# Patient Record
Sex: FEMALE | Race: WHITE | NOT HISPANIC OR LATINO | Employment: OTHER | URBAN - NONMETROPOLITAN AREA
[De-identification: names, ages, dates, MRNs, and addresses within clinical notes are randomized per-mention and may not be internally consistent; named-entity substitution may affect disease eponyms.]

---

## 2017-12-19 ENCOUNTER — AMBULATORY - GICH (OUTPATIENT)
Dept: RADIOLOGY | Facility: OTHER | Age: 62
End: 2017-12-19

## 2017-12-19 DIAGNOSIS — R78.81 BACTEREMIA: ICD-10-CM

## 2018-01-26 ENCOUNTER — DOCUMENTATION ONLY (OUTPATIENT)
Dept: FAMILY MEDICINE | Facility: OTHER | Age: 63
End: 2018-01-26

## 2018-01-26 RX ORDER — GABAPENTIN 300 MG/1
300 CAPSULE ORAL AT BEDTIME
Status: ON HOLD | COMMUNITY
Start: 2016-06-07 | End: 2020-08-22 | Stop reason: DRUGHIGH

## 2018-01-26 RX ORDER — MONTELUKAST SODIUM 10 MG/1
10 TABLET ORAL EVERY EVENING
COMMUNITY

## 2018-01-26 RX ORDER — HYDROCODONE BITARTRATE AND ACETAMINOPHEN 10; 325 MG/1; MG/1
1 TABLET ORAL EVERY 6 HOURS PRN
COMMUNITY
Start: 2016-02-23 | End: 2020-08-22

## 2018-01-26 RX ORDER — CALCITRIOL 0.25 UG/1
1 CAPSULE, LIQUID FILLED ORAL EVERY OTHER DAY
Status: ON HOLD | COMMUNITY
Start: 2015-09-11 | End: 2020-08-22

## 2018-01-26 RX ORDER — ASPIRIN 81 MG/1
81 TABLET, CHEWABLE ORAL DAILY
COMMUNITY

## 2018-01-26 RX ORDER — OXYCODONE HYDROCHLORIDE 5 MG/1
5 TABLET ORAL EVERY 6 HOURS PRN
COMMUNITY
Start: 2016-12-23 | End: 2021-09-14

## 2018-01-26 RX ORDER — FLUTICASONE PROPIONATE 50 MCG
2 SPRAY, SUSPENSION (ML) NASAL DAILY
COMMUNITY
Start: 2015-06-30

## 2018-01-26 RX ORDER — LEVOTHYROXINE SODIUM 200 UG/1
1 TABLET ORAL DAILY
COMMUNITY
Start: 2016-04-01

## 2018-01-26 RX ORDER — LEVOTHYROXINE SODIUM 25 UG/1
0.5 TABLET ORAL DAILY
COMMUNITY
Start: 2015-09-10

## 2018-01-26 RX ORDER — NITROGLYCERIN 0.4 MG/1
0.4 TABLET SUBLINGUAL
COMMUNITY

## 2018-01-26 RX ORDER — ALLOPURINOL 100 MG/1
100 TABLET ORAL DAILY
COMMUNITY
Start: 2015-09-11

## 2018-01-26 RX ORDER — ATORVASTATIN CALCIUM 80 MG/1
80 TABLET, FILM COATED ORAL EVERY EVENING
COMMUNITY
Start: 2016-01-13

## 2018-01-26 RX ORDER — CALCIUM CARBONATE 750 MG/1
1-2 TABLET, CHEWABLE ORAL DAILY PRN
Status: ON HOLD | COMMUNITY
End: 2020-08-22

## 2018-01-26 RX ORDER — METOPROLOL TARTRATE 25 MG/1
25 TABLET, FILM COATED ORAL 2 TIMES DAILY
COMMUNITY
End: 2021-09-14

## 2018-01-26 RX ORDER — LORATADINE 10 MG/1
10 TABLET ORAL DAILY PRN
Status: ON HOLD | COMMUNITY
End: 2020-08-22

## 2018-01-26 RX ORDER — TORSEMIDE 20 MG/1
80 TABLET ORAL DAILY
Status: ON HOLD | COMMUNITY
Start: 2016-12-25 | End: 2020-08-22

## 2018-01-26 RX ORDER — BUPROPION HYDROCHLORIDE 150 MG/1
3 TABLET ORAL DAILY
COMMUNITY
Start: 2015-10-12

## 2018-01-26 RX ORDER — CLOTRIMAZOLE 1 %
CREAM (GRAM) TOPICAL
COMMUNITY
Start: 2016-06-04 | End: 2021-09-14

## 2019-11-27 ENCOUNTER — MEDICAL CORRESPONDENCE (OUTPATIENT)
Dept: HEALTH INFORMATION MANAGEMENT | Facility: OTHER | Age: 64
End: 2019-11-27

## 2019-11-27 ENCOUNTER — RESULTS ONLY (OUTPATIENT)
Dept: LAB | Age: 64
End: 2019-11-27

## 2019-11-27 LAB
ALBUMIN SERPL-MCNC: 3.7 G/DL (ref 3.5–5.7)
ALP SERPL-CCNC: 102 U/L (ref 34–104)
ALT SERPL W P-5'-P-CCNC: 30 U/L (ref 7–52)
ANION GAP SERPL CALCULATED.3IONS-SCNC: 9 MMOL/L (ref 3–14)
AST SERPL W P-5'-P-CCNC: 26 U/L (ref 13–39)
BASOPHILS # BLD AUTO: 0.1 10E9/L (ref 0–0.2)
BASOPHILS NFR BLD AUTO: 1.2 %
BILIRUB SERPL-MCNC: 0.3 MG/DL (ref 0.3–1)
BUN SERPL-MCNC: 22 MG/DL (ref 7–25)
CALCIUM SERPL-MCNC: 9.3 MG/DL (ref 8.6–10.3)
CHLORIDE SERPL-SCNC: 95 MMOL/L (ref 98–107)
CO2 SERPL-SCNC: 31 MMOL/L (ref 21–31)
CREAT SERPL-MCNC: 2.81 MG/DL (ref 0.6–1.2)
DIFFERENTIAL METHOD BLD: ABNORMAL
EOSINOPHIL # BLD AUTO: 0.5 10E9/L (ref 0–0.7)
EOSINOPHIL NFR BLD AUTO: 6.4 %
ERYTHROCYTE [DISTWIDTH] IN BLOOD BY AUTOMATED COUNT: 16.5 % (ref 10–15)
GFR SERPL CREATININE-BSD FRML MDRD: 17 ML/MIN/{1.73_M2}
GLUCOSE SERPL-MCNC: 104 MG/DL (ref 70–105)
HBA1C MFR BLD: 6.3 % (ref 4–6)
HCT VFR BLD AUTO: 32.3 % (ref 35–47)
HGB BLD-MCNC: 10.4 G/DL (ref 11.7–15.7)
IMM GRANULOCYTES # BLD: 0.1 10E9/L (ref 0–0.4)
IMM GRANULOCYTES NFR BLD: 1.3 %
LYMPHOCYTES # BLD AUTO: 2.7 10E9/L (ref 0.8–5.3)
LYMPHOCYTES NFR BLD AUTO: 32.2 %
MCH RBC QN AUTO: 31.7 PG (ref 26.5–33)
MCHC RBC AUTO-ENTMCNC: 32.2 G/DL (ref 31.5–36.5)
MCV RBC AUTO: 99 FL (ref 78–100)
MONOCYTES # BLD AUTO: 1.2 10E9/L (ref 0–1.3)
MONOCYTES NFR BLD AUTO: 14.3 %
NEUTROPHILS # BLD AUTO: 3.7 10E9/L (ref 1.6–8.3)
NEUTROPHILS NFR BLD AUTO: 44.6 %
PLATELET # BLD AUTO: 274 10E9/L (ref 150–450)
POTASSIUM SERPL-SCNC: 4.5 MMOL/L (ref 3.5–5.1)
PROT SERPL-MCNC: 6.7 G/DL (ref 6.4–8.9)
RBC # BLD AUTO: 3.28 10E12/L (ref 3.8–5.2)
SODIUM SERPL-SCNC: 135 MMOL/L (ref 134–144)
URATE SERPL-MCNC: 2.7 MG/DL (ref 4.4–7.6)
WBC # BLD AUTO: 8.3 10E9/L (ref 4–11)

## 2019-12-05 ENCOUNTER — HOSPITAL ENCOUNTER (EMERGENCY)
Facility: OTHER | Age: 64
Discharge: ANOTHER HOSPITAL | End: 2019-12-05
Attending: PHYSICIAN ASSISTANT | Admitting: PHYSICIAN ASSISTANT
Payer: MEDICARE

## 2019-12-05 ENCOUNTER — APPOINTMENT (OUTPATIENT)
Dept: GENERAL RADIOLOGY | Facility: OTHER | Age: 64
End: 2019-12-05
Attending: PHYSICIAN ASSISTANT
Payer: MEDICARE

## 2019-12-05 VITALS
WEIGHT: 293 LBS | SYSTOLIC BLOOD PRESSURE: 106 MMHG | TEMPERATURE: 97.8 F | BODY MASS INDEX: 67.81 KG/M2 | DIASTOLIC BLOOD PRESSURE: 63 MMHG | HEIGHT: 55 IN | HEART RATE: 76 BPM | OXYGEN SATURATION: 93 % | RESPIRATION RATE: 16 BRPM

## 2019-12-05 DIAGNOSIS — E87.1 HYPONATREMIA: ICD-10-CM

## 2019-12-05 DIAGNOSIS — R79.89 AZOTEMIA: ICD-10-CM

## 2019-12-05 DIAGNOSIS — A41.89 SEPSIS DUE TO OTHER ETIOLOGY (H): ICD-10-CM

## 2019-12-05 DIAGNOSIS — L03.818 CELLULITIS OF OTHER SPECIFIED SITE: ICD-10-CM

## 2019-12-05 LAB
ALBUMIN SERPL-MCNC: 3.5 G/DL (ref 3.5–5.7)
ALP SERPL-CCNC: 112 U/L (ref 34–104)
ALT SERPL W P-5'-P-CCNC: 50 U/L (ref 7–52)
ANION GAP SERPL CALCULATED.3IONS-SCNC: 13 MMOL/L (ref 3–14)
AST SERPL W P-5'-P-CCNC: 38 U/L (ref 13–39)
BASOPHILS # BLD AUTO: 0.1 10E9/L (ref 0–0.2)
BASOPHILS NFR BLD AUTO: 0.2 %
BILIRUB SERPL-MCNC: 0.4 MG/DL (ref 0.3–1)
BUN SERPL-MCNC: 25 MG/DL (ref 7–25)
CALCIUM SERPL-MCNC: 9.4 MG/DL (ref 8.6–10.3)
CHLORIDE SERPL-SCNC: 89 MMOL/L (ref 98–107)
CO2 SERPL-SCNC: 28 MMOL/L (ref 21–31)
CREAT SERPL-MCNC: 3.47 MG/DL (ref 0.6–1.2)
CRP SERPL-MCNC: 19.6 MG/L
DIFFERENTIAL METHOD BLD: ABNORMAL
EOSINOPHIL # BLD AUTO: 0 10E9/L (ref 0–0.7)
EOSINOPHIL NFR BLD AUTO: 0.2 %
ERYTHROCYTE [DISTWIDTH] IN BLOOD BY AUTOMATED COUNT: 16.6 % (ref 10–15)
GFR SERPL CREATININE-BSD FRML MDRD: 13 ML/MIN/{1.73_M2}
GLUCOSE SERPL-MCNC: 220 MG/DL (ref 70–105)
HCT VFR BLD AUTO: 30.1 % (ref 35–47)
HGB BLD-MCNC: 9.7 G/DL (ref 11.7–15.7)
IMM GRANULOCYTES # BLD: 0.4 10E9/L (ref 0–0.4)
IMM GRANULOCYTES NFR BLD: 1.8 %
LACTATE SERPL-SCNC: 2.2 MMOL/L (ref 0.5–2.2)
LYMPHOCYTES # BLD AUTO: 1.2 10E9/L (ref 0.8–5.3)
LYMPHOCYTES NFR BLD AUTO: 5.3 %
MCH RBC QN AUTO: 31.5 PG (ref 26.5–33)
MCHC RBC AUTO-ENTMCNC: 32.2 G/DL (ref 31.5–36.5)
MCV RBC AUTO: 98 FL (ref 78–100)
MONOCYTES # BLD AUTO: 2.3 10E9/L (ref 0–1.3)
MONOCYTES NFR BLD AUTO: 9.8 %
NEUTROPHILS # BLD AUTO: 19 10E9/L (ref 1.6–8.3)
NEUTROPHILS NFR BLD AUTO: 82.7 %
PLATELET # BLD AUTO: 322 10E9/L (ref 150–450)
POTASSIUM SERPL-SCNC: 4.5 MMOL/L (ref 3.5–5.1)
PROT SERPL-MCNC: 6.9 G/DL (ref 6.4–8.9)
RBC # BLD AUTO: 3.08 10E12/L (ref 3.8–5.2)
SODIUM SERPL-SCNC: 130 MMOL/L (ref 134–144)
WBC # BLD AUTO: 23 10E9/L (ref 4–11)

## 2019-12-05 PROCEDURE — 25000125 ZZHC RX 250: Performed by: PHYSICIAN ASSISTANT

## 2019-12-05 PROCEDURE — 86140 C-REACTIVE PROTEIN: CPT | Performed by: PHYSICIAN ASSISTANT

## 2019-12-05 PROCEDURE — 80053 COMPREHEN METABOLIC PANEL: CPT | Performed by: PHYSICIAN ASSISTANT

## 2019-12-05 PROCEDURE — 99291 CRITICAL CARE FIRST HOUR: CPT | Mod: Z6 | Performed by: PHYSICIAN ASSISTANT

## 2019-12-05 PROCEDURE — 96368 THER/DIAG CONCURRENT INF: CPT | Performed by: PHYSICIAN ASSISTANT

## 2019-12-05 PROCEDURE — 99291 CRITICAL CARE FIRST HOUR: CPT | Mod: 25 | Performed by: PHYSICIAN ASSISTANT

## 2019-12-05 PROCEDURE — 25800030 ZZH RX IP 258 OP 636: Performed by: PHYSICIAN ASSISTANT

## 2019-12-05 PROCEDURE — 96375 TX/PRO/DX INJ NEW DRUG ADDON: CPT | Performed by: PHYSICIAN ASSISTANT

## 2019-12-05 PROCEDURE — 36415 COLL VENOUS BLD VENIPUNCTURE: CPT | Performed by: PHYSICIAN ASSISTANT

## 2019-12-05 PROCEDURE — 96374 THER/PROPH/DIAG INJ IV PUSH: CPT | Performed by: PHYSICIAN ASSISTANT

## 2019-12-05 PROCEDURE — 71046 X-RAY EXAM CHEST 2 VIEWS: CPT

## 2019-12-05 PROCEDURE — 87040 BLOOD CULTURE FOR BACTERIA: CPT | Mod: 91 | Performed by: PHYSICIAN ASSISTANT

## 2019-12-05 PROCEDURE — 96365 THER/PROPH/DIAG IV INF INIT: CPT | Performed by: PHYSICIAN ASSISTANT

## 2019-12-05 PROCEDURE — 25000128 H RX IP 250 OP 636: Performed by: PHYSICIAN ASSISTANT

## 2019-12-05 PROCEDURE — 83605 ASSAY OF LACTIC ACID: CPT | Performed by: PHYSICIAN ASSISTANT

## 2019-12-05 PROCEDURE — 85025 COMPLETE CBC W/AUTO DIFF WBC: CPT | Performed by: PHYSICIAN ASSISTANT

## 2019-12-05 RX ORDER — DIPHENHYDRAMINE HYDROCHLORIDE 50 MG/ML
50 INJECTION INTRAMUSCULAR; INTRAVENOUS ONCE
Status: COMPLETED | OUTPATIENT
Start: 2019-12-05 | End: 2019-12-05

## 2019-12-05 RX ADMIN — FAMOTIDINE 40 MG: 10 INJECTION, SOLUTION INTRAVENOUS at 14:42

## 2019-12-05 RX ADMIN — TAZOBACTAM SODIUM AND PIPERACILLIN SODIUM 4.5 G: 500; 4 INJECTION, SOLUTION INTRAVENOUS at 13:41

## 2019-12-05 RX ADMIN — DIPHENHYDRAMINE HYDROCHLORIDE 50 MG: 50 INJECTION INTRAMUSCULAR; INTRAVENOUS at 14:21

## 2019-12-05 RX ADMIN — VANCOMYCIN HYDROCHLORIDE 2000 MG: 1 INJECTION, POWDER, LYOPHILIZED, FOR SOLUTION INTRAVENOUS at 13:40

## 2019-12-05 ASSESSMENT — MIFFLIN-ST. JEOR: SCORE: 1233.11

## 2019-12-05 NOTE — ED AVS SNAPSHOT
Lucila Willett #[id number] (CSN:[id number]) (64 year old F) (Adm: 19)    [id number]               [facility]: [phone]            Patient Demographics     Patient Name  Lucila Willett Sex  Female          Age  1955 (64 year old) SSN  [id number] Address  [address] [facility] [address] Phone  [phone] (Home)  [phone] (Mobile) *Preferred*      PCP and Center    Primary Care Provider  Phone Center     No Ref-Primary, Physician None No address on file        Emergency Contact(s)     Name Relation Home Work Mobile    Tim Willett Son   [phone]    Lino Willett Son   [phone]    Anant Laneer   [phone]      Admission Information     Current Information     Attending Provider Admitting Provider Admission Type Admission Status    Tim Ordoñez PA-C  Emergency Admission (Confirmed)            Admission Date/Time Discharge Date Hospital Service Auth/Cert Status    19  11:24 AM  Emergency Medicine Incomplete            Hospital Area Unit Room/Bed       [facility] EMERGENCY DEPT ED03/ED03                     Admission     Complaint    None      Hospital Account     Name Acct ID Class Status Primary Coverage    Lucila Willett [id number] Emergency Open MEDICARE - MEDICARE            Guarantor Account (for Hospital Account #[id number])     Name Relation to Pt Service Area Active? Acct Type    Lucila Willett Self FCS Yes Personal/Family    Address Phone          [address] [place] [address] [phone](H)  [phone](O)              Coverage Information (for Hospital Account #[id number])     1. MEDICARE/MEDICARE     F/O Payor/Plan Precert #    MEDICARE/MEDICARE     Subscriber Subscriber #    Lucila Willett [id number]    Address Phone    ATTN CLAIMS  [address]  [facility] [place] [address] [phone]          2. COMMERCIAL/IMCARE PMAP     F/O Payor/Plan Precert #     COMMERCIAL/Actively [facility]     Subscriber Subscriber #    Lucila Willett [id number]    Address Phone    [address]  [place] [address] [phone]                                                      INTERAGENCY TRANSFER FORM - PHYSICIAN ORDERS   12/5/2019                      [facility]: [phone]             Attending Provider:  Tim Ordoñez PA-C    Allergies:  Cetirizine, Pregabalin, Latex, Rosiglitazone    Infection:  None   Service:  EMERGENCY ME    Ht:  0.61 m (2')   Wt:  133.8 kg (295 lb)   Admission Wt:  133.8 kg (295 lb)    BMI:  360.08 kg/m 2   BSA:  1.51 m 2            ED Clinical Impression     Diagnosis Description Comment Added By Time Added    Sepsis due to other etiology (H) [A41.89] Sepsis due to other etiology (H) [A41.89]  Tim Ordoñez PA-C 12/5/2019  2:03 PM    Cellulitis of other specified site [L03.818] Cellulitis of other specified site [L03.818]  Tim Ordoñez PA-C 12/5/2019  2:04 PM    Hyponatremia [E87.1] Hyponatremia [E87.1]  Tim Ordoñez PA-C 12/5/2019  2:04 PM    Azotemia [R79.89] Azotemia [R79.89]  Tim Ordoñez PA-C 12/5/2019  2:04 PM      Hospital Problem List as of 12/5/2019    None      Non-hospital Problem List as of 12/5/2019             Priority Class Noted    Essential hypertension Medium  2/27/2008    Hypothyroidism Medium  2/27/2008    Sleep apnea Medium  2/27/2008    Gout Medium  2/29/2008    Atherosclerosis of coronary artery Medium  3/17/2010    Chronic kidney disease, stage IV (severe) (H) Medium  3/23/2012    Anemia Medium  9/12/2012    Cellulitis and abscess of leg, except foot Medium  9/12/2012    Diabetes mellitus, type II (H) Medium  9/12/2012    Hypertensive heart failure with end stage renal disease (H) Medium  9/12/2012    Morbid obesity (H) Medium  9/12/2012    Hypokalemia Medium  9/13/2012    Type 2 diabetes mellitus (H) Medium  6/11/2013    Chronic pain disorder Medium  6/30/2015    Diastolic  heart failure (H) Medium  7/1/2015    Foot ulcer (H) Medium  1/11/2016    Cellulitis of left lower extremity Medium  6/9/2016    Severe sepsis (H) Medium  6/9/2016    Hyponatremia Medium  6/10/2016    Leukocytosis Medium  12/19/2016      Current Code Status     Date Active Code Status Order ID Comments User Context       Not on file         Medication Review      UNREVIEWED medications. Ask your doctor about these medications       Dose / Directions Comments   allopurinol 100 MG tablet  Commonly known as:  ZYLOPRIM      Dose:  200 mg  Take 200 mg by mouth daily  Refills:  0      atorvastatin 80 MG tablet  Commonly known as:  LIPITOR      Dose:  80 mg  Take 80 mg by mouth every evening  Refills:  0      buPROPion 150 MG 24 hr tablet  Commonly known as:  WELLBUTRIN XL      Dose:  3 tablet  Take 3 tablets by mouth daily *SWALLOW WHOLE. DO NOT CRUSH OR CHEW*  Refills:  0      calcitRIOL 0.25 MCG capsule  Commonly known as:  ROCALTROL      Dose:  1 capsule  Take 1 capsule by mouth every other day  Refills:  0      calcium carbonate 750 MG Chew      Dose:  1-2 tablet  Take 1-2 tablets by mouth daily as needed for heartburn  Refills:  0      clotrimazole 1 % external cream  Commonly known as:  LOTRIMIN      APPLY TO AFFECTED AREAS  TOPICALLY TWO TIMES A DAY AS NEEDED FOR YEAST INFECTION  Refills:  0      D 5000 125 MCG (5000 UT) Tabs  Generic drug:  Cholecalciferol      Dose:  5,000 Units  Take 5,000 Units by mouth daily  Refills:  0      FLUoxetine 20 MG capsule  Commonly known as:  PROzac      TAKE 2 CAPSULES ORALLY   TWO TIMES A DAY  Refills:  0      fluticasone 50 MCG/ACT nasal spray  Commonly known as:  FLONASE      Dose:  2 spray  Spray 2 sprays into both nostrils daily as needed for allergies  Refills:  0      gabapentin 300 MG capsule  Commonly known as:  NEURONTIN      Dose:  300 mg  Take 300 mg by mouth At Bedtime  Refills:  0      GoodSense Aspirin 325 MG tablet  Generic drug:  aspirin      Dose:  325 mg  Take  325 mg by mouth daily With a meal  Refills:  0      HYDROcodone-acetaminophen  MG per tablet  Commonly known as:  NORCO      Dose:  1-2 tablet  Take 1-2 tablets by mouth daily as needed for pain  Refills:  0      insulin aspart 100 UNIT/ML pen  Commonly known as:  NovoLOG PEN      Dose:  10-20 Units  Inject 10-20 Units Subcutaneous Inject 10-20 Units subcutaneous 4 times daily before meals and at bedtime. CARB RATIO 1 UNIT PER 5 CARBS  Refills:  0      insulin glargine 100 UNIT/ML pen  Commonly known as:  LANTUS PEN      Dose:  60 Units  Inject 60 Units Subcutaneous every morning  Refills:  0      * levothyroxine 25 MCG tablet  Commonly known as:  SYNTHROID/LEVOTHROID      Dose:  1 tablet  Take 1 tablet by mouth daily Take with 200 microgram tablet.  Refills:  0      * levothyroxine 200 MCG tablet  Commonly known as:  SYNTHROID/LEVOTHROID      Dose:  1 tablet  Take 1 tablet by mouth daily Take on empty stomach with plenty of water. Take with 25 microgram tablet.  Refills:  0      loratadine 10 MG tablet  Commonly known as:  CLARITIN      Dose:  10 mg  Take 10 mg by mouth daily as needed for allergies  Refills:  0      metoprolol tartrate 25 MG tablet  Commonly known as:  LOPRESSOR      Dose:  25 mg  Take 25 mg by mouth 2 times daily  Refills:  0      montelukast 10 MG tablet  Commonly known as:  SINGULAIR      Dose:  10 mg  Take 10 mg by mouth At Bedtime  Refills:  0      nitroGLYcerin 0.4 MG sublingual tablet  Commonly known as:  NITROSTAT      Dose:  0.4 mg  Place 0.4 mg under the tongue Place 0.4 mg under the tongue every 5 minutes if needed. Patient not using at this time.   Indications: ANGINA  Refills:  0      oxyCODONE 5 MG tablet  Commonly known as:  ROXICODONE      Dose:  5 mg  Take 5 mg by mouth every 4 hours as needed for pain  Refills:  0      ranitidine 150 MG tablet  Commonly known as:  ZANTAC      Dose:  150 mg  Take 150 mg by mouth daily  Refills:  0      torsemide 20 MG tablet  Commonly known  "as:  DEMADEX      Dose:  80 mg  Take 80 mg by mouth daily  Refills:  0          * This list has 2 medication(s) that are the same as other medications prescribed for you. Read the directions carefully, and ask your doctor or other care provider to review them with you.            CONTINUE these medications which have NOT CHANGED       Dose / Directions Comments   blood glucose lancets standard  Commonly known as:  NO BRAND SPECIFIED      Test blood sugar 3 times daily and as needed for signs of high or low blood sugar.  Refills:  0      blood glucose test strip  Commonly known as:  NO BRAND SPECIFIED      TEST BLOOD SUGARS THREE  TIMES A DAY AND AS NEEDED  Refills:  0      Needle (Disp) 31G X 5/16\" Misc      Pen needle  Refills:  0                                                   INTERAGENCY TRANSFER FORM - NURSING   12/5/2019                      [facility]: [phone]             Attending Provider:  Tim Ordoñez PA-C    Allergies:  Cetirizine, Pregabalin, Latex, Rosiglitazone    Infection:  None   Service:  EMERGENCY ME    Ht:  0.61 m (2')   Wt:  133.8 kg (295 lb)   Admission Wt:  133.8 kg (295 lb)    BMI:  360.08 kg/m 2   BSA:  1.51 m 2            Advance Directives        Scanned docmt in ACP Activity?            No scanned doc          Immunizations     None      ASSESSMENT     Discharge Profile Flowsheet     GASTROINTESTINAL (ADULT,PEDIATRIC,OB)     GI Signs/Symptoms  abdominal discomfort 12/05/19 1142    GI WDL  ex;all 12/05/19 1142   COMMUNICATION ASSESSMENT      Abdominal Appearance  obese 12/05/19 1142   Patient's communication style  spoken language (English or Bilingual) 12/05/19 1123    All Quadrants Abdominal Palpation  soft/nontender 12/05/19 1142                 Vitals     Vital Signs Flowsheet     VITAL SIGNS     Height  0.61 m (2') 12/05/19 1128    Temp  97.8  F (36.6  C) 12/05/19 1128   Height Method  Stated 12/05/19 1128    Temp src  Tympanic 12/05/19 1128   " Weight  133.8 kg (295 lb) 12/05/19 1128    Resp  16 12/05/19 1128   BSA (Calculated - sq m)  1.51 12/05/19 1128    Pulse  74 12/05/19 1328   BMI (Calculated)  360.08 12/05/19 1128    BP  113/54 12/05/19 1328   FRANSISCO COMA SCALE      OXYGEN THERAPY     Best Eye Response  4-->(E4) spontaneous 12/05/19 1140    SpO2  95 % 12/05/19 1328   Best Motor Response  6-->(M6) obeys commands 12/05/19 1140    O2 Device  None (Room air) 12/05/19 1128   Best Verbal Response  5-->(V5) oriented 12/05/19 1140    HEIGHT AND WEIGHT     Sondheimer Coma Scale Score  15 12/05/19 1140            Patient Lines/Drains/Airways Status    Active LINES/DRAINS/AIRWAYS     Name: Placement date: Placement time: Site: Days: Last dressing change:    Peripheral IV 12/05/19 Right Upper forearm  12/05/19   1252   Upper forearm  less than 1             Patient Lines/Drains/Airways Status    Active PICC/CVC     None            Intake/Output Detail Report     None      Case Management/Discharge Planning     Case Management/Discharge Planning Flowsheet     VIOLENCE RISK     RETIRE:ABUSE RISK SCREEN      Feels Like Hurting Others  no 12/05/19 1129   OBSERVATION: Is there reason to believe there has been maltreatment of a vulnerable adult (ie. Physical/Sexual/Emotional abuse, self neglect, lack of adequate food, shelter, medical care, or financial exploitation)?  no 12/05/19 1129                  [facility]: [phone]            Medication Administration Report for Lucila Willett as of 12/05/19 1413   Legend:    Given Hold Not Given Due Canceled Entry Other Actions    Time Time (Time) Time  Time-Action       Inactive    Active    Linked        Medications 11/29/19 11/30/19 12/01/19 12/02/19 12/03/19 12/04/19 12/05/19   Completed Medications    piperacillin-tazobactam (ZOSYN) intermittent infusion 4.5 g  Dose: 4.5 g  Freq: ONCE Route: IV  Indications of Use: SEPSIS  Last Dose: Stopped (12/05/19 1413)  Start: 12/05/19 1315   End:  19   Admin. Amount: 4.5 g = 100 mL Conc: 4.5 g/100 mL  Last Admin: 19 1341  Dispense Loc: EMERGENCY DEPARTMENT  Infused Over: 30 Minutes  Administrations Remainin  Volume: 100 mL   Current Line: Peripheral IV 19 Right Upper forearm          1341 (4.5 g)-New Bag       1413-Stopped           vancomycin (VANCOCIN) 2,000 mg in sodium chloride 0.9 % 500 mL intermittent infusion  Dose: 2,000 mg  Freq: ONCE Route: IV  Indications of Use: SKIN AND SOFT TISSUE INFECTION  Last Dose: Stopped (19)  Start: 19   End: 19   Admin Instructions: Vesicant.    For peripheral catheter: If dose between 2-2.5 g, infuse over 2 hours.    For central catheter: If dose is below 2 g, dose may be infused over 1 hour.    Admin. Amount: 2,000 mg  Last Admin: 19 1340  Dispense Loc: [facility]  Infused Over: 2 Hours  Administrations Remainin  Volume: 500 mL   Mixture Administration Information:   Medication Type Amount   vancomycin 100 mg/mL SOLR Medications 2,000 mg   sodium chloride 0.9 % SOLN Base 500 mL           Current Line: Peripheral IV 19 Right Upper forearm          1340 (2,000 mg)-New Bag       1413-ED Infusing on Admission/transfer                       INTERAGENCY TRANSFER FORM - NOTES (H&P, Discharge Summary, Consults, Procedures, Therapies)   2019                      [facility]: [phone]            History & Physicals    No notes of this type exist for this encounter.     Discharge Summaries    No notes of this type exist for this encounter.     Consult Notes    No notes of this type exist for this encounter.     Progress Notes - Physician (Notes for yesterday and today)    No notes of this type exist for this encounter.     Procedure Notes    No notes of this type exist for this encounter.     Progress Notes - Therapies (Notes from 19 through 19)    No notes of this type exist for this encounter.                                          INTERAGENCY TRANSFER FORM - LAB / IMAGING / EKG / EMG RESULTS   12/5/2019                      [facility]: [phone]            Unresulted Labs (24h ago, onward)     Start       Ordered    12/05/19 1133  UA reflex to Microscopic and Culture  ROUTINE,   Routine      12/05/19 1132               Lab Results - 3 Days      Comprehensive metabolic panel [[id number]] (Abnormal)  Resulted: 12/05/19 1356, Result status: Final result   Ordering provider:  Tim Ordoñez PA-C  12/05/19 1314 Resulting lab:  [facility]    Specimen Information    Type Source Collected On   Blood   12/05/19 1145          Components    Component Value Reference Range Flag Lab   Sodium 130 134 - 144 mmol/L  [facility]   Potassium 4.5 3.5 - 5.1 mmol/L   [facility]   Chloride 89 98 - 107 mmol/L  [facility]   Carbon Dioxide 28 21 - 31 mmol/L   [facility]   Anion Gap 13 3 - 14 mmol/L   [facility]   Glucose 220 70 - 105 mg/dL H [facility]   Urea Nitrogen 25 7 - 25 mg/dL   [facility]   Creatinine 3.47 0.60 - 1.20 mg/dL H [facility]   GFR Estimate 13 >60 mL/min/{1.73_m2}  [facility]   GFR Estimate If Black 16 >60 mL/min/{1.73_m2}  [facility]   Calcium 9.4 8.6 - 10.3 mg/dL   [facility]   Bilirubin Total 0.4 0.3 - 1.0 mg/dL   [facility]   Albumin 3.5 3.5 - 5.7 g/dL   [facility]   Protein Total 6.9 6.4 - 8.9 g/dL   [facility]   Alkaline Phosphatase 112 34 - 104 U/L H [facility]   ALT 50 7 - 52 U/L   [facility]   AST 38 13 - 39 U/L   [facility]            CRP inflammation [[id number]] (Abnormal)  Resulted: 12/05/19 1311, Result status: Final result   Ordering provider:  Tim Ordoñez PA-C  12/05/19 1245 Resulting lab:  [facility]    Specimen Information    Type Source Collected On   Blood   12/05/19 1145          Components    Component Value Reference Range Flag Lab   CRP Inflammation 19.6 <0.5 mg/L H [facility]            Lactic acid [[id number]]   Resulted: 12/05/19 1311, Result status: Final result   Ordering provider:  Tim Ordoñez PA-C  12/05/19 1245 Resulting lab:  [facility]    Specimen Information    Type Source Collected On   Blood   12/05/19 1145          Components    Component Value Reference Range Flag Lab   Lactic Acid 2.2 0.5 - 2.2 mmol/L   [facility]            Blood culture [[id number]]  Resulted: 12/05/19 1237, Result status: In process   Ordering provider:  Tim Ordoñez PA-C  12/05/19 1159 Resulting lab:  [facility]    Specimen Information    Type Source Collected On   Blood   12/05/19 1225            Blood culture [[id number]]  Resulted: 12/05/19 1236, Result status: In process   Ordering provider:  Tim Ordoñez PA-C  12/05/19 1159 Resulting lab:  [facility]    Specimen Information    Type Source Collected On   Blood   12/05/19 1225            CBC with platelets differential [[id number]] (Abnormal)  Resulted: 12/05/19 1152, Result status: Final result   Ordering provider:  Tim Ordoñez PA-C  12/05/19 1132 Resulting lab:  [facility]    Specimen Information    Type Source Collected On   Blood   12/05/19 1145          Components    Component Value Reference Range Flag Lab   WBC 23.0 4.0 - 11.0 10e9/L H [facility]   RBC Count 3.08 3.8 - 5.2 10e12/L  [facility]   Hemoglobin 9.7 11.7 - 15.7 g/dL  [facility]   Hematocrit 30.1 35.0 - 47.0 %  [facility]   MCV 98 78 - 100 fl   [facility]   MCH 31.5 26.5 - 33.0 pg   [facility]   MCHC 32.2 31.5 - 36.5 g/dL   [facility]   RDW 16.6 10.0 - 15.0 % H [facility]   Platelet Count 322 150 - 450 10e9/L   [facility]   Diff Method Automated Method     [facility]   % Neutrophils 82.7 %   [facility]   % Lymphocytes 5.3 %   [facility]   % Monocytes 9.8 %   [facility]   % Eosinophils 0.2 %   [facility]   % Basophils 0.2 %   [facility]   % Immature Granulocytes 1.8 %   [facility]   Absolute Neutrophil 19.0 1.6 - 8.3 10e9/L H [facility]   Absolute  Lymphocytes 1.2 0.8 - 5.3 10e9/L   [facility]   Absolute Monocytes 2.3 0.0 - 1.3 10e9/L H [facility]   Absolute Eosinophils 0.0 0.0 - 0.7 10e9/L   [facility]   Absolute Basophils 0.1 0.0 - 0.2 10e9/L   [facility]   Abs Immature Granulocytes 0.4 0 - 0.4 10e9/L   [facility]            Testing Performed By     Lab - Abbreviation Name Director Address Valid Date Range    45 - FEA732 [facility] Unknown Unknown 01/28/02 0000 - Present    1743 - [facility] Unknown [address]  [place] [address] 08/07/15 0948 - Present               Imaging Results - 3 Days      XR Chest 2 Views [[id number]]  Resulted: 12/05/19 1226, Result status: Final result   Ordering provider:  Tim Ordoñez PA-C  12/05/19 1201 Resulted by:  Srinivasan Richardson MD   Performed:  12/05/19 1207 - 12/05/19 1221 Accession number:  [id number]   Resulting lab:  RADIOLOGY RESULTS   Narrative:  PROCEDURE:  XR CHEST 2 VW    HISTORY:  cough.     COMPARISON:  12/19/2016    FINDINGS:   The cardiac silhouette is enlarged but stable. There are postoperative  changes of median sternotomy. Right-sided central line is present with  tip overlying the cavoatrial junction. The pulmonary vasculature is  normal.  The lungs are clear. No pleural effusion or pneumothorax.     Impression:  IMPRESSION:  Mild cardiomegaly, stable.      SRINIVASAN RICHARDSON MD      Testing Performed By     Lab - Abbreviation Name Director Address Valid Date Range    104 - Rad [facility] RADIOLOGY RESULTS Unknown Unknown 02/16/05 1553 - Present            Encounter-Level Documents:    There are no encounter-level documents.     Order-Level Documents:    There are no order-level documents.

## 2019-12-05 NOTE — ED TRIAGE NOTES
Pt arrives to ED from [facility]. Pt reports having a wound on her leg that needs to be looked at. Pt has a wound vac but it is not on at this time. Pt felt feverish, afebrile currently.    Yasmeen Clark RN on 12/5/2019 at 11:25 AM

## 2019-12-05 NOTE — ED PROVIDER NOTES
History     Chief Complaint   Patient presents with     Wound Check     HPI  Lucila Willett is a 64 year old female who presents to the emergency department this evening for evaluation she is status post left brachiocephalic arteriovenous fistula creation using autogenous access on 09/06/19 with Dr. Gomez. Her recovery was complicated by steal syndrome, with patient complaining of left hand numbness, coolness and pain. She was taken to the operating room on 10/25/19 for a left arm DRIL procedure (distal revascularization with interval ligation; left brachial artery to radial artery bypass using harvested left leg greater saphenous vein). She recovered well. Her post-operative course was complicated by ongoing left hand numbness and dysfunction and also a non healing left thigh wound currently treated with a wound vac.     She was seen in the clinic 3 days ago it was noted that she had left thigh erythema and warmth.  It was noted by the nursing home today that she had increased drainage and the patient felt feverish.  She was not chilled.  She does not have any headaches dizziness no visual disturbances runny nose nasal congestion sinus pressure sore throat or cough no chest pain shortness of breath.    Patient does dialyze Monday, Wednesday and IV and Friday here in town.        Allergies:  Allergies   Allergen Reactions     Cetirizine      Other reaction(s): *Unknown - Pt Doesn't Remember     Pregabalin      Other reaction(s): Other - Describe In Comment Field  Floaters in eyes.      Latex Rash     Patient states she gets a rash     Rosiglitazone Rash     Patient states she gets a rash       Problem List:    Patient Active Problem List    Diagnosis Date Noted     Leukocytosis 12/19/2016     Priority: Medium     Hyponatremia 06/10/2016     Priority: Medium     Cellulitis of left lower extremity 06/09/2016     Priority: Medium     Severe sepsis (H) 06/09/2016     Priority: Medium     Foot ulcer (H) 01/11/2016      Priority: Medium     Diastolic heart failure (H) 07/01/2015     Priority: Medium     Chronic pain disorder 06/30/2015     Priority: Medium     Overview:   Overview:   [facility] Agreement for Opioid Treatment  PHYSICIAN:  Gauri Grimm MD  PHARMACY:  [facility] in [place], [place]  PHARMACY PHONE:  [phone]    Last two urine drug screens have been negative - on 1/7/13 it had been two days since she took lortab.  Negative for norco aug 2015       Type 2 diabetes mellitus (H) 06/11/2013     Priority: Medium     Overview:   Overview:   LDL 94 - on crestor  Off valsartan because of worsening renal function ? Hypotension (10/2014: will retry low dose lisinopril)  On ASA  Right diabetic retinopathy on right 2/2014       Hypokalemia 09/13/2012     Priority: Medium     Anemia 09/12/2012     Priority: Medium     Cellulitis and abscess of leg, except foot 09/12/2012     Priority: Medium     Diabetes mellitus, type II (H) 09/12/2012     Priority: Medium     Overview:   a system change updated this record. This will not affect patient care or billing. This comment can be deleted.       Hypertensive heart failure with end stage renal disease (H) 09/12/2012     Priority: Medium     Morbid obesity (H) 09/12/2012     Priority: Medium     Chronic kidney disease, stage IV (severe) (H) 03/23/2012     Priority: Medium     Overview:   Overview:   IMO Update 10/11       Atherosclerosis of coronary artery 03/17/2010     Priority: Medium     Overview:   Overview:   IMO Update       Gout 02/29/2008     Priority: Medium     Essential hypertension 02/27/2008     Priority: Medium     Hypothyroidism 02/27/2008     Priority: Medium     Overview:   Overview:   IMO Update 10/11       Sleep apnea 02/27/2008     Priority: Medium     Overview:   Overview:   10/2012: cpap did not work - trying BIPAP          Past Medical History:    Past Medical History:   Diagnosis Date     Atherosclerotic heart disease of native coronary artery without angina  pectoris      Cellulitis      Chronic kidney disease      Diastolic congestive heart failure (H)      Hypothyroidism      Major depressive disorder, single episode      Obesity      Personal history of other medical treatment (CODE)      Sleep apnea      Type 2 diabetes mellitus without complications (H)        Past Surgical History:    Past Surgical History:   Procedure Laterality Date     CHOLECYSTECTOMY      No Comments Provided     OTHER SURGICAL HISTORY      [id number],OTHER     OTHER SURGICAL HISTORY      51359.0,NM SPINE FUSION ANTER 3 SGMTS     OTHER SURGICAL HISTORY      LOC-1006.04,CABG     OTHER SURGICAL HISTORY      140897,ENDOSCOPY GI       Family History:    History reviewed. No pertinent family history.    Social History:  Marital Status:   [4]  Social History     Tobacco Use     Smoking status: Former Smoker     Packs/day: 2.00     Years: 20.00     Pack years: 40.00     Types: Cigarettes     Last attempt to quit: 1997     Years since quittin.2     Smokeless tobacco: Never Used   Substance Use Topics     Alcohol use: Yes     Alcohol/week: 0.0 standard drinks     Comment: Alcoholic Drinks/day: Occasionally     Drug use: Unknown     Types: Other     Comment: Drug use: No        Medications:    allopurinol (ZYLOPRIM) 100 MG tablet  aspirin (GOODSENSE ASPIRIN) 325 MG tablet  atorvastatin (LIPITOR) 80 MG tablet  blood glucose (NO BRAND SPECIFIED) lancets standard  blood glucose monitoring (NO BRAND SPECIFIED) test strip  buPROPion (WELLBUTRIN XL) 150 MG 24 hr tablet  calcitRIOL (ROCALTROL) 0.25 MCG capsule  calcium carbonate 750 MG CHEW  Cholecalciferol (D 5000) 5000 UNITS TABS  clotrimazole (LOTRIMIN) 1 % cream  FLUoxetine (PROZAC) 20 MG capsule  fluticasone (FLONASE) 50 MCG/ACT spray  gabapentin (NEURONTIN) 300 MG capsule  HYDROcodone-acetaminophen (NORCO)  MG per tablet  insulin aspart (NOVOLOG PEN) 100 UNIT/ML injection  insulin glargine (LANTUS) 100 UNIT/ML injection  levothyroxine  "(SYNTHROID/LEVOTHROID) 200 MCG tablet  levothyroxine (SYNTHROID/LEVOTHROID) 25 MCG tablet  loratadine (CLARITIN) 10 MG tablet  metoprolol tartrate (LOPRESSOR) 25 MG tablet  montelukast (SINGULAIR) 10 MG tablet  Tari, Disp, 31G X 5/16\" MISC  nitroGLYcerin (NITROSTAT) 0.4 MG sublingual tablet  oxyCODONE IR (ROXICODONE) 5 MG tablet  ranitidine (ZANTAC) 150 MG tablet  torsemide (DEMADEX) 20 MG tablet          Review of Systems     Pertinent positives and negatives are as above in the HPI. 10 point review of systems is otherwise negative.      Physical Exam   BP: 119/62  Pulse: 92  Temp: 97.8  F (36.6  C)  Resp: 16  Height: 61 cm (2')  Weight: 133.8 kg (295 lb)  SpO2: 91 %      Physical Exam   Exam:  Constitutional: healthy, alert and no distress  Head: Normocephalic. No masses, lesions, tenderness or abnormalities  Neck: Neck supple. No adenopathy. Thyroid symmetric, normal size,, Carotids without bruits.  ENT: ENT exam normal, no neck nodes or sinus tenderness  Cardiovascular: Normal heart tones  Respiratory: negative, Percussion normal. Good diaphragmatic excursion. Lungs clear  Gastrointestinal: Abdomen soft, non-tender. BS normal. No masses, organomegaly  : Deferred  Musculoskeletal: No significant tenderness except in the left upper thigh.  Patient does not have any calf tenderness on examination Homans sign bilateral negative.  Left upper extremity range of motion is full.  Mild tenderness over her graft site.  Skin: There is erythema to her left anterior thigh with an open wound and granulation tissue.  The surrounding erythema is hot.  I do not feel any palpable abscess or drainable fluid collection at this time.  The left upper extremity surgical site there is no evidence of any erythema and is clear.  Neurologic: Gait not tested, range of motion of the lower and upper extremities are intact.  Sensation and motor intact as well.  Pulses distally.  Psychiatric: mentation appears normal and affect " normal/bright  Hematologic/Lymphatic/Immunologic: Normal cervical lymph nodes      ED Course        Procedures               Critical Care time:  none               Results for orders placed or performed during the hospital encounter of 12/05/19 (from the past 24 hour(s))   CBC with platelets differential   Result Value Ref Range    WBC 23.0 (H) 4.0 - 11.0 10e9/L    RBC Count 3.08 (L) 3.8 - 5.2 10e12/L    Hemoglobin 9.7 (L) 11.7 - 15.7 g/dL    Hematocrit 30.1 (L) 35.0 - 47.0 %    MCV 98 78 - 100 fl    MCH 31.5 26.5 - 33.0 pg    MCHC 32.2 31.5 - 36.5 g/dL    RDW 16.6 (H) 10.0 - 15.0 %    Platelet Count 322 150 - 450 10e9/L    Diff Method Automated Method     % Neutrophils 82.7 %    % Lymphocytes 5.3 %    % Monocytes 9.8 %    % Eosinophils 0.2 %    % Basophils 0.2 %    % Immature Granulocytes 1.8 %    Absolute Neutrophil 19.0 (H) 1.6 - 8.3 10e9/L    Absolute Lymphocytes 1.2 0.8 - 5.3 10e9/L    Absolute Monocytes 2.3 (H) 0.0 - 1.3 10e9/L    Absolute Eosinophils 0.0 0.0 - 0.7 10e9/L    Absolute Basophils 0.1 0.0 - 0.2 10e9/L    Abs Immature Granulocytes 0.4 0 - 0.4 10e9/L   CRP inflammation   Result Value Ref Range    CRP Inflammation 19.6 (H) <0.5 mg/L   Lactic acid   Result Value Ref Range    Lactic Acid 2.2 0.5 - 2.2 mmol/L   Comprehensive metabolic panel   Result Value Ref Range    Sodium 130 (L) 134 - 144 mmol/L    Potassium 4.5 3.5 - 5.1 mmol/L    Chloride 89 (L) 98 - 107 mmol/L    Carbon Dioxide 28 21 - 31 mmol/L    Anion Gap 13 3 - 14 mmol/L    Glucose 220 (H) 70 - 105 mg/dL    Urea Nitrogen 25 7 - 25 mg/dL    Creatinine 3.47 (H) 0.60 - 1.20 mg/dL    GFR Estimate 13 (L) >60 mL/min/[1.73_m2]    GFR Estimate If Black 16 (L) >60 mL/min/[1.73_m2]    Calcium 9.4 8.6 - 10.3 mg/dL    Bilirubin Total 0.4 0.3 - 1.0 mg/dL    Albumin 3.5 3.5 - 5.7 g/dL    Protein Total 6.9 6.4 - 8.9 g/dL    Alkaline Phosphatase 112 (H) 34 - 104 U/L    ALT 50 7 - 52 U/L    AST 38 13 - 39 U/L   XR Chest 2 Views    Narrative    PROCEDURE:  XR  CHEST 2 VW    HISTORY:  cough.     COMPARISON:  12/19/2016    FINDINGS:   The cardiac silhouette is enlarged but stable. There are postoperative  changes of median sternotomy. Right-sided central line is present with  tip overlying the cavoatrial junction. The pulmonary vasculature is  normal.  The lungs are clear. No pleural effusion or pneumothorax.      Impression    IMPRESSION:  Mild cardiomegaly, stable.      SRINIVASAN RICHARDSON MD       Medications   vancomycin (VANCOCIN) 2,000 mg in sodium chloride 0.9 % 500 mL intermittent infusion (2,000 mg Intravenous New Bag 12/5/19 1340)   piperacillin-tazobactam (ZOSYN) intermittent infusion 4.5 g (4.5 g Intravenous New Bag 12/5/19 1341)       Assessments & Plan (with Medical Decision Making)     I have reviewed the nursing notes.    I have reviewed the findings, diagnosis, plan and need for follow up with the patient.  There is a high probability of clinically significant deterioration in this patient's condition which required the highest level of my preparedness to intervene urgently.  Differential diagnosis at this point includes: infection/sepsis, CNS lesions/bleed, atypical migraine, encephalopathy, hypoglycemia, metabolic derangements, electrolyte disturbances, medication effects, substance abuse, seizure, CVA-TIA, dementia-delirium, acute blood loss, as well as other etiologies.    The patient who presents for evaluation she is 64 years old comes in with left lower extremity upper medial thigh area of erythema was noted to have increasing drainage and fevers 100-101 today from her nursing home staff earlier. She does have a  wound VAC that has been removed by nursing home staff.  At this time there is minimal serosanguineous drainage and underlying erythema is noted.  It is tender to touch.  No pain distally and pulses and sensation are noted.  She is noted to have a white count of 23,000 with a CRP of 19.  Her chemistry is reassuring and noted above with a potassium  of 4.5.   Vancomycin and Zosyn have been started.  Blood cultures pending   lactic acid 2.2. The patient will be admitted for further evaluation IV antibiotics I will discuss this with the patient at the bedside as well as the hospitalist service.  Because the patient does have dialysis and has dialysis tomorrow  the patient will be transferred to [facility]. I will speak with the hospitalist service at [facility].  I spoken with Nadeem Boyer MD and patient will be admitted to the ICU for further evaluation and monitoring.  I discussed this with the patient she is in agreement. She will be transferred via [facility] ambulance.   After the patient received her Zosyn she developed some erythema to her arms and trunk.  She will be giving 50 mg of IV Benadryl and famotidine for her reaction. She responded well.  Ms. Willett was able to ear before transfer.  Aggregate Critical Care Time is 40 minutes.  This was the time seeing the patient at the bedside while the patient was critical.  My time did not include any pertinent procedures or activities that did not contribute to the patient's care while the patient was critical.             New Prescriptions    No medications on file       Final diagnoses:   Sepsis due to other etiology (H)   Cellulitis of other specified site   Hyponatremia   Azotemia       12/5/2019   [facility]     Tim Ordoñez PA-C  12/05/19 3377

## 2019-12-05 NOTE — ED NOTES
Pt broke out with a rash over arms and torso after zosyn was infused. MD notified orders received.    Yasmeen Clark RN on 12/5/2019 at 2:24 PM

## 2019-12-05 NOTE — ED NOTES
Updated [facility] staff on pt status & plan to transfer to [facility].

## 2019-12-05 NOTE — PHARMACY-VANCOMYCIN DOSING SERVICE
Pharmacy Consult- Vancomycin Assessment    Lucila Willett is a 64 year old female admitted on 2019.    Vancomycin has been ordered per MD, for the indication of: skin and soft tissue infection    Current Antibiotic Regimen Includes: Vancomycin x 1    Patient Active Problem List   Diagnosis     Anemia     Atherosclerosis of coronary artery     Cellulitis and abscess of leg, except foot     Cellulitis of left lower extremity     Chronic kidney disease, stage IV (severe) (H)     Chronic pain disorder     Diabetes mellitus, type II (H)     Diastolic heart failure (H)     Essential hypertension     Foot ulcer (H)     Gout     Hypertensive heart failure with end stage renal disease (H)     Hypokalemia     Hyponatremia     Hypothyroidism     Leukocytosis     Morbid obesity (H)     Severe sepsis (H)     Sleep apnea     Type 2 diabetes mellitus (H)       Allergies (and reaction): Cetirizine; Pregabalin; Latex; and Rosiglitazone    Most recent flowsheet Weight: 133.8 kg (295 lb)  Most recent flowsheet Height: 61 cm (2')    No intake or output data in the 24 hours ending 19 1245    Tmax = Temp (24hrs), Av.8  F (36.6  C), Min:97.8  F (36.6  C), Max:97.8  F (36.6  C)      Recent Labs   Lab Test 19  1145   WBC 23.0*       Recent Labs   Lab Test 19  0800 16  0707 16  1234   BUN 22 78* 95*   CR 2.81* 2.98* 3.89*       estimated creatinine clearance is 42.2 mL/min (A) (based on SCr of 2.81 mg/dL (H)).    Cultures Pending: blood x 2    Culture Results: pending    Plan: Give Vancomycin 2000 mg x 1 for skin and soft tissue infection.  Will adjust if clinically indicated and continued.    Regimen Start Date: 19  Recommended Dose: 2000 mg, which provides 14.9 mg/kg/dose  Interval: ONCE  Goal Trough: 10-15 mg/L    Thank You for the consult. Will continue to follow.    Ivonne Freire RPH ....................  2019   12:45 PM

## 2019-12-06 NOTE — PROGRESS NOTES
:    Entered chart to provide discharge update to [facility] (admissions) at [facility].  Patient was transferred to [facility].    HELEN Smith on 12/6/2019 at 9:06 AM

## 2019-12-10 ENCOUNTER — DOCUMENTATION ONLY (OUTPATIENT)
Dept: OTHER | Facility: CLINIC | Age: 64
End: 2019-12-10

## 2019-12-11 LAB
BACTERIA SPEC CULT: NORMAL
BACTERIA SPEC CULT: NORMAL
SPECIMEN SOURCE: NORMAL
SPECIMEN SOURCE: NORMAL

## 2019-12-27 RX ORDER — OXYCODONE HYDROCHLORIDE 5 MG/1
TABLET ORAL
Qty: 5 TABLET | OUTPATIENT
Start: 2019-12-27

## 2019-12-27 NOTE — TELEPHONE ENCOUNTER
No PCP at [facility]  Taisha Jacobson RN on 12/27/2019 at 7:59 AM

## 2020-06-29 ENCOUNTER — RESULTS ONLY (OUTPATIENT)
Dept: LAB | Age: 65
End: 2020-06-29

## 2020-06-29 ENCOUNTER — MEDICAL CORRESPONDENCE (OUTPATIENT)
Dept: HEALTH INFORMATION MANAGEMENT | Facility: OTHER | Age: 65
End: 2020-06-29

## 2020-07-01 ENCOUNTER — APPOINTMENT (OUTPATIENT)
Dept: LAB | Facility: OTHER | Age: 65
End: 2020-07-01
Attending: FAMILY MEDICINE
Payer: MEDICARE

## 2020-07-01 LAB
SARS-COV-2 RNA SPEC QL NAA+PROBE: NOT DETECTED
SPECIMEN SOURCE: NORMAL

## 2020-07-18 ENCOUNTER — RESULTS ONLY (OUTPATIENT)
Dept: LAB | Age: 65
End: 2020-07-18

## 2020-07-18 ENCOUNTER — MEDICAL CORRESPONDENCE (OUTPATIENT)
Dept: HEALTH INFORMATION MANAGEMENT | Facility: OTHER | Age: 65
End: 2020-07-18

## 2020-07-20 LAB
SARS-COV-2 RNA SPEC QL NAA+PROBE: NOT DETECTED
SPECIMEN SOURCE: NORMAL

## 2020-08-21 ENCOUNTER — HOSPITAL ENCOUNTER (OUTPATIENT)
Facility: OTHER | Age: 65
Setting detail: OBSERVATION
Discharge: NURSING, REHAB OR LONG-TERM CARE | End: 2020-08-22
Attending: PHYSICIAN ASSISTANT | Admitting: FAMILY MEDICINE
Payer: MEDICARE

## 2020-08-21 ENCOUNTER — APPOINTMENT (OUTPATIENT)
Dept: ULTRASOUND IMAGING | Facility: OTHER | Age: 65
End: 2020-08-21
Attending: PHYSICIAN ASSISTANT
Payer: MEDICARE

## 2020-08-21 DIAGNOSIS — I12.9 HYPERTENSIVE CHRONIC KIDNEY DISEASE WITH STAGE 1 THROUGH STAGE 4 CHRONIC KIDNEY DISEASE, OR UNSPECIFIED CHRONIC KIDNEY DISEASE: ICD-10-CM

## 2020-08-21 DIAGNOSIS — Z87.891 PERSONAL HISTORY OF TOBACCO USE, PRESENTING HAZARDS TO HEALTH: ICD-10-CM

## 2020-08-21 DIAGNOSIS — L02.414 ABSCESS OF LEFT ARM: ICD-10-CM

## 2020-08-21 DIAGNOSIS — I25.10 ATHEROSCLEROSIS OF NATIVE CORONARY ARTERY WITHOUT ANGINA PECTORIS, UNSPECIFIED WHETHER NATIVE OR TRANSPLANTED HEART: ICD-10-CM

## 2020-08-21 DIAGNOSIS — M79.622 PAIN OF LEFT UPPER ARM: ICD-10-CM

## 2020-08-21 DIAGNOSIS — L03.114 CELLULITIS OF LEFT UPPER EXTREMITY: Primary | ICD-10-CM

## 2020-08-21 DIAGNOSIS — E11.22 TYPE 2 DIABETES MELLITUS WITH DIABETIC CHRONIC KIDNEY DISEASE, UNSPECIFIED CKD STAGE, UNSPECIFIED WHETHER LONG TERM INSULIN USE (H): ICD-10-CM

## 2020-08-21 DIAGNOSIS — I50.9 HEART FAILURE, UNSPECIFIED HF CHRONICITY, UNSPECIFIED HEART FAILURE TYPE (H): ICD-10-CM

## 2020-08-21 DIAGNOSIS — E03.9 HYPOTHYROIDISM, UNSPECIFIED TYPE: ICD-10-CM

## 2020-08-21 PROBLEM — L03.90 CELLULITIS: Status: ACTIVE | Noted: 2020-08-21

## 2020-08-21 LAB
ALBUMIN SERPL-MCNC: 4.4 G/DL (ref 3.5–5.7)
ALP SERPL-CCNC: 163 U/L (ref 34–104)
ALT SERPL W P-5'-P-CCNC: 43 U/L (ref 7–52)
ANION GAP SERPL CALCULATED.3IONS-SCNC: 10 MMOL/L (ref 3–14)
AST SERPL W P-5'-P-CCNC: 27 U/L (ref 13–39)
BASOPHILS # BLD AUTO: 0.1 10E9/L (ref 0–0.2)
BASOPHILS NFR BLD AUTO: 0.4 %
BILIRUB SERPL-MCNC: 0.7 MG/DL (ref 0.3–1)
BUN SERPL-MCNC: 12 MG/DL (ref 7–25)
CALCIUM SERPL-MCNC: 9.5 MG/DL (ref 8.6–10.3)
CHLORIDE SERPL-SCNC: 89 MMOL/L (ref 98–107)
CO2 SERPL-SCNC: 36 MMOL/L (ref 21–31)
CREAT SERPL-MCNC: 2.64 MG/DL (ref 0.6–1.2)
DIFFERENTIAL METHOD BLD: ABNORMAL
EOSINOPHIL # BLD AUTO: 0.2 10E9/L (ref 0–0.7)
EOSINOPHIL NFR BLD AUTO: 1.7 %
ERYTHROCYTE [DISTWIDTH] IN BLOOD BY AUTOMATED COUNT: 15.9 % (ref 10–15)
GFR SERPL CREATININE-BSD FRML MDRD: 18 ML/MIN/{1.73_M2}
GLUCOSE SERPL-MCNC: 160 MG/DL (ref 70–105)
HCT VFR BLD AUTO: 36.6 % (ref 35–47)
HGB BLD-MCNC: 11.9 G/DL (ref 11.7–15.7)
IMM GRANULOCYTES # BLD: 0.1 10E9/L (ref 0–0.4)
IMM GRANULOCYTES NFR BLD: 0.9 %
LYMPHOCYTES # BLD AUTO: 1.5 10E9/L (ref 0.8–5.3)
LYMPHOCYTES NFR BLD AUTO: 11.8 %
MCH RBC QN AUTO: 34.2 PG (ref 26.5–33)
MCHC RBC AUTO-ENTMCNC: 32.5 G/DL (ref 31.5–36.5)
MCV RBC AUTO: 105 FL (ref 78–100)
MONOCYTES # BLD AUTO: 2.2 10E9/L (ref 0–1.3)
MONOCYTES NFR BLD AUTO: 16.9 %
NEUTROPHILS # BLD AUTO: 8.7 10E9/L (ref 1.6–8.3)
NEUTROPHILS NFR BLD AUTO: 68.3 %
PLATELET # BLD AUTO: 226 10E9/L (ref 150–450)
POTASSIUM SERPL-SCNC: 4.1 MMOL/L (ref 3.5–5.1)
PROT SERPL-MCNC: 7.9 G/DL (ref 6.4–8.9)
RBC # BLD AUTO: 3.48 10E12/L (ref 3.8–5.2)
SODIUM SERPL-SCNC: 135 MMOL/L (ref 134–144)
WBC # BLD AUTO: 12.7 10E9/L (ref 4–11)

## 2020-08-21 PROCEDURE — 99219 ZZC INITIAL OBSERVATION CARE,LEVL II: CPT | Performed by: FAMILY MEDICINE

## 2020-08-21 PROCEDURE — 85025 COMPLETE CBC W/AUTO DIFF WBC: CPT | Performed by: FAMILY MEDICINE

## 2020-08-21 PROCEDURE — 87070 CULTURE OTHR SPECIMN AEROBIC: CPT | Performed by: FAMILY MEDICINE

## 2020-08-21 PROCEDURE — 25800030 ZZH RX IP 258 OP 636: Performed by: PHYSICIAN ASSISTANT

## 2020-08-21 PROCEDURE — 96365 THER/PROPH/DIAG IV INF INIT: CPT | Performed by: PHYSICIAN ASSISTANT

## 2020-08-21 PROCEDURE — 87077 CULTURE AEROBIC IDENTIFY: CPT | Performed by: FAMILY MEDICINE

## 2020-08-21 PROCEDURE — G0378 HOSPITAL OBSERVATION PER HR: HCPCS

## 2020-08-21 PROCEDURE — 25000132 ZZH RX MED GY IP 250 OP 250 PS 637: Mod: GY | Performed by: PHYSICIAN ASSISTANT

## 2020-08-21 PROCEDURE — 36415 COLL VENOUS BLD VENIPUNCTURE: CPT | Performed by: FAMILY MEDICINE

## 2020-08-21 PROCEDURE — 93971 EXTREMITY STUDY: CPT | Mod: LT

## 2020-08-21 PROCEDURE — 99285 EMERGENCY DEPT VISIT HI MDM: CPT | Mod: 25 | Performed by: PHYSICIAN ASSISTANT

## 2020-08-21 PROCEDURE — 96372 THER/PROPH/DIAG INJ SC/IM: CPT

## 2020-08-21 PROCEDURE — 99285 EMERGENCY DEPT VISIT HI MDM: CPT | Mod: Z6 | Performed by: PHYSICIAN ASSISTANT

## 2020-08-21 PROCEDURE — 25000128 H RX IP 250 OP 636: Performed by: PHYSICIAN ASSISTANT

## 2020-08-21 PROCEDURE — 80053 COMPREHEN METABOLIC PANEL: CPT | Performed by: FAMILY MEDICINE

## 2020-08-21 PROCEDURE — 25000131 ZZH RX MED GY IP 250 OP 636 PS 637: Mod: GY | Performed by: FAMILY MEDICINE

## 2020-08-21 PROCEDURE — 25000132 ZZH RX MED GY IP 250 OP 250 PS 637: Mod: GY | Performed by: FAMILY MEDICINE

## 2020-08-21 RX ORDER — ASPIRIN 325 MG
325 TABLET ORAL DAILY
Status: DISCONTINUED | OUTPATIENT
Start: 2020-08-22 | End: 2020-08-22 | Stop reason: HOSPADM

## 2020-08-21 RX ORDER — GABAPENTIN 300 MG/1
300 CAPSULE ORAL AT BEDTIME
Status: DISCONTINUED | OUTPATIENT
Start: 2020-08-21 | End: 2020-08-22 | Stop reason: HOSPADM

## 2020-08-21 RX ORDER — TORSEMIDE 10 MG/1
80 TABLET ORAL DAILY
Status: DISCONTINUED | OUTPATIENT
Start: 2020-08-22 | End: 2020-08-22 | Stop reason: HOSPADM

## 2020-08-21 RX ORDER — METOPROLOL TARTRATE 25 MG/1
25 TABLET, FILM COATED ORAL 2 TIMES DAILY
Status: DISCONTINUED | OUTPATIENT
Start: 2020-08-21 | End: 2020-08-22 | Stop reason: HOSPADM

## 2020-08-21 RX ORDER — VITAMIN B COMPLEX
5000 TABLET ORAL DAILY
Status: DISCONTINUED | OUTPATIENT
Start: 2020-08-22 | End: 2020-08-22 | Stop reason: HOSPADM

## 2020-08-21 RX ORDER — NALOXONE HYDROCHLORIDE 0.4 MG/ML
.1-.4 INJECTION, SOLUTION INTRAMUSCULAR; INTRAVENOUS; SUBCUTANEOUS
Status: DISCONTINUED | OUTPATIENT
Start: 2020-08-21 | End: 2020-08-22 | Stop reason: HOSPADM

## 2020-08-21 RX ORDER — NICOTINE POLACRILEX 4 MG
15-30 LOZENGE BUCCAL
Status: DISCONTINUED | OUTPATIENT
Start: 2020-08-21 | End: 2020-08-22 | Stop reason: HOSPADM

## 2020-08-21 RX ORDER — DEXTROSE MONOHYDRATE 25 G/50ML
25-50 INJECTION, SOLUTION INTRAVENOUS
Status: DISCONTINUED | OUTPATIENT
Start: 2020-08-21 | End: 2020-08-22 | Stop reason: HOSPADM

## 2020-08-21 RX ORDER — BUPROPION HYDROCHLORIDE 150 MG/1
450 TABLET ORAL DAILY
Status: DISCONTINUED | OUTPATIENT
Start: 2020-08-22 | End: 2020-08-22 | Stop reason: HOSPADM

## 2020-08-21 RX ORDER — ATORVASTATIN CALCIUM 40 MG/1
80 TABLET, FILM COATED ORAL EVERY EVENING
Status: DISCONTINUED | OUTPATIENT
Start: 2020-08-21 | End: 2020-08-22 | Stop reason: HOSPADM

## 2020-08-21 RX ORDER — LEVOTHYROXINE SODIUM 25 UG/1
25 TABLET ORAL DAILY
Status: DISCONTINUED | OUTPATIENT
Start: 2020-08-22 | End: 2020-08-21

## 2020-08-21 RX ORDER — FAMOTIDINE 20 MG/1
20 TABLET, FILM COATED ORAL DAILY
Status: DISCONTINUED | OUTPATIENT
Start: 2020-08-22 | End: 2020-08-22 | Stop reason: HOSPADM

## 2020-08-21 RX ORDER — ALLOPURINOL 100 MG/1
200 TABLET ORAL DAILY
Status: DISCONTINUED | OUTPATIENT
Start: 2020-08-22 | End: 2020-08-22 | Stop reason: HOSPADM

## 2020-08-21 RX ORDER — LIDOCAINE 40 MG/G
CREAM TOPICAL
Status: DISCONTINUED | OUTPATIENT
Start: 2020-08-21 | End: 2020-08-22 | Stop reason: HOSPADM

## 2020-08-21 RX ORDER — MONTELUKAST SODIUM 5 MG/1
10 TABLET, CHEWABLE ORAL AT BEDTIME
Status: DISCONTINUED | OUTPATIENT
Start: 2020-08-21 | End: 2020-08-22 | Stop reason: HOSPADM

## 2020-08-21 RX ORDER — LEVOTHYROXINE SODIUM 100 UG/1
200 TABLET ORAL DAILY
Status: DISCONTINUED | OUTPATIENT
Start: 2020-08-22 | End: 2020-08-21

## 2020-08-21 RX ADMIN — VANCOMYCIN HYDROCHLORIDE 2000 MG: 500 INJECTION, POWDER, LYOPHILIZED, FOR SOLUTION INTRAVENOUS at 19:06

## 2020-08-21 RX ADMIN — MONTELUKAST SODIUM 10 MG: 5 TABLET, CHEWABLE ORAL at 21:56

## 2020-08-21 RX ADMIN — GABAPENTIN 300 MG: 300 CAPSULE ORAL at 21:57

## 2020-08-21 RX ADMIN — CEPHALEXIN 500 MG: 250 CAPSULE ORAL at 17:58

## 2020-08-21 RX ADMIN — INSULIN GLARGINE 27 UNITS: 100 INJECTION, SOLUTION SUBCUTANEOUS at 23:13

## 2020-08-21 RX ADMIN — ATORVASTATIN CALCIUM 80 MG: 40 TABLET ORAL at 21:57

## 2020-08-21 RX ADMIN — METOPROLOL TARTRATE 25 MG: 25 TABLET, FILM COATED ORAL at 21:57

## 2020-08-21 ASSESSMENT — ENCOUNTER SYMPTOMS
ADENOPATHY: 0
BRUISES/BLEEDS EASILY: 0
WOUND: 0
FEVER: 0
SHORTNESS OF BREATH: 0
BACK PAIN: 0
CONFUSION: 0
ABDOMINAL PAIN: 0
CHEST TIGHTNESS: 0
CHILLS: 0
HEMATURIA: 0
COLOR CHANGE: 1

## 2020-08-21 ASSESSMENT — MIFFLIN-ST. JEOR: SCORE: 1795.07

## 2020-08-21 NOTE — ED NOTES
PATIENT ASSESSMENT:    Patient reporting having to have surgery to bypass her dialysis shunt and the surgical incision appears to be infected.  Surgery done at [facility].  Has an area of 9 cm x 15 cm of redness, warmth and firmness surrounding the incision site.  Patient also has a lesion under her left breast that appears to have purulent drainage and she is calling it a boil and states it is painful.  Pain at 4/10.

## 2020-08-21 NOTE — ED TRIAGE NOTES
"Pt comes into the ER today from [facility] via care cab. Pt reports left upper arm pain and she feels as if sutures are still left in the arm from a previous surgery- bypass- in this arm on a recent date that she cannot remember. She had dialysis in this arm today as well. Reports she had a fever before dialysis, none in triage, and she reports she feels \"good\". Wound is healing with a small open area.   "

## 2020-08-22 VITALS
SYSTOLIC BLOOD PRESSURE: 114 MMHG | DIASTOLIC BLOOD PRESSURE: 40 MMHG | TEMPERATURE: 97.5 F | WEIGHT: 280 LBS | HEART RATE: 74 BPM | BODY MASS INDEX: 47.8 KG/M2 | HEIGHT: 64 IN | OXYGEN SATURATION: 96 % | RESPIRATION RATE: 20 BRPM

## 2020-08-22 PROCEDURE — 96372 THER/PROPH/DIAG INJ SC/IM: CPT

## 2020-08-22 PROCEDURE — 25000132 ZZH RX MED GY IP 250 OP 250 PS 637: Mod: GY | Performed by: FAMILY MEDICINE

## 2020-08-22 PROCEDURE — 99217 ZZC OBSERVATION CARE DISCHARGE: CPT | Performed by: FAMILY MEDICINE

## 2020-08-22 PROCEDURE — G0378 HOSPITAL OBSERVATION PER HR: HCPCS

## 2020-08-22 PROCEDURE — 25000131 ZZH RX MED GY IP 250 OP 636 PS 637: Mod: GY | Performed by: FAMILY MEDICINE

## 2020-08-22 RX ORDER — LIDOCAINE/PRILOCAINE 2.5 %-2.5%
CREAM (GRAM) TOPICAL
COMMUNITY

## 2020-08-22 RX ORDER — ONDANSETRON 4 MG/1
4 TABLET, ORALLY DISINTEGRATING ORAL EVERY 8 HOURS PRN
COMMUNITY
End: 2021-09-14

## 2020-08-22 RX ORDER — AMOXICILLIN 250 MG
1 CAPSULE ORAL DAILY
COMMUNITY

## 2020-08-22 RX ORDER — IPRATROPIUM BROMIDE AND ALBUTEROL SULFATE 2.5; .5 MG/3ML; MG/3ML
1 SOLUTION RESPIRATORY (INHALATION) EVERY 4 HOURS PRN
COMMUNITY
End: 2021-09-14

## 2020-08-22 RX ORDER — GABAPENTIN 100 MG/1
100 CAPSULE ORAL AT BEDTIME
COMMUNITY

## 2020-08-22 RX ORDER — TRAMADOL HYDROCHLORIDE 50 MG/1
50 TABLET ORAL EVERY 12 HOURS PRN
COMMUNITY
End: 2021-09-14

## 2020-08-22 RX ORDER — DOXYCYCLINE 100 MG/1
100 CAPSULE ORAL EVERY 12 HOURS SCHEDULED
Status: DISCONTINUED | OUTPATIENT
Start: 2020-08-22 | End: 2020-08-22 | Stop reason: HOSPADM

## 2020-08-22 RX ORDER — FEXOFENADINE HCL 60 MG/1
60 TABLET, FILM COATED ORAL DAILY
COMMUNITY
End: 2021-09-14

## 2020-08-22 RX ORDER — ACETAMINOPHEN 500 MG
1000 TABLET ORAL 3 TIMES DAILY
COMMUNITY
End: 2021-09-14

## 2020-08-22 RX ORDER — NYSTATIN 100000 [USP'U]/G
POWDER TOPICAL 2 TIMES DAILY PRN
COMMUNITY

## 2020-08-22 RX ORDER — MAGNESIUM HYDROXIDE/ALUMINUM HYDROXICE/SIMETHICONE 120; 1200; 1200 MG/30ML; MG/30ML; MG/30ML
15 SUSPENSION ORAL EVERY 4 HOURS PRN
COMMUNITY

## 2020-08-22 RX ORDER — SEVELAMER CARBONATE 800 MG/1
800 TABLET, FILM COATED ORAL
COMMUNITY

## 2020-08-22 RX ORDER — ONDANSETRON 4 MG/1
4 TABLET, FILM COATED ORAL EVERY 8 HOURS PRN
COMMUNITY
End: 2020-08-22

## 2020-08-22 RX ORDER — ALUMINA, MAGNESIA, AND SIMETHICONE 2400; 2400; 240 MG/30ML; MG/30ML; MG/30ML
30 SUSPENSION ORAL EVERY 6 HOURS PRN
Status: DISCONTINUED | OUTPATIENT
Start: 2020-08-22 | End: 2020-08-22 | Stop reason: HOSPADM

## 2020-08-22 RX ORDER — INSULIN GLARGINE 100 [IU]/ML
27 INJECTION, SOLUTION SUBCUTANEOUS 2 TIMES DAILY
Status: DISCONTINUED | OUTPATIENT
Start: 2020-08-22 | End: 2020-08-22 | Stop reason: HOSPADM

## 2020-08-22 RX ORDER — LIDOCAINE 4 G/G
1 PATCH TOPICAL EVERY 24 HOURS
COMMUNITY
End: 2021-09-14

## 2020-08-22 RX ORDER — DOXYCYCLINE 100 MG/1
100 CAPSULE ORAL EVERY 12 HOURS
Qty: 20 CAPSULE | Refills: 0 | Status: SHIPPED | OUTPATIENT
Start: 2020-08-23 | End: 2020-09-02

## 2020-08-22 RX ADMIN — VITAMIN D, TAB 1000IU (100/BT) 5000 UNITS: 25 TAB at 09:46

## 2020-08-22 RX ADMIN — FAMOTIDINE 20 MG: 20 TABLET ORAL at 09:47

## 2020-08-22 RX ADMIN — METOPROLOL TARTRATE 25 MG: 25 TABLET, FILM COATED ORAL at 09:48

## 2020-08-22 RX ADMIN — DOXYCYCLINE 100 MG: 100 CAPSULE ORAL at 10:50

## 2020-08-22 RX ADMIN — TORSEMIDE 80 MG: 10 TABLET ORAL at 09:48

## 2020-08-22 RX ADMIN — ASPIRIN 325 MG ORAL TABLET 325 MG: 325 PILL ORAL at 09:46

## 2020-08-22 RX ADMIN — LEVOTHYROXINE SODIUM 225 MCG: 0.15 TABLET ORAL at 07:36

## 2020-08-22 RX ADMIN — ALLOPURINOL 200 MG: 100 TABLET ORAL at 09:47

## 2020-08-22 RX ADMIN — BUPROPION HYDROCHLORIDE 450 MG: 150 TABLET, FILM COATED, EXTENDED RELEASE ORAL at 09:46

## 2020-08-22 RX ADMIN — FLUOXETINE 40 MG: 20 CAPSULE ORAL at 09:47

## 2020-08-22 RX ADMIN — ALUMINUM HYDROXIDE, MAGNESIUM HYDROXIDE, AND DIMETHICONE 30 ML: 400; 400; 40 SUSPENSION ORAL at 10:01

## 2020-08-22 RX ADMIN — INSULIN GLARGINE 27 UNITS: 100 INJECTION, SOLUTION SUBCUTANEOUS at 09:47

## 2020-08-22 ASSESSMENT — ENCOUNTER SYMPTOMS
RHINORRHEA: 0
ARTHRALGIAS: 0
HEADACHES: 0
LIGHT-HEADEDNESS: 0
SORE THROAT: 0
VOMITING: 0
SHORTNESS OF BREATH: 0
NAUSEA: 0
ABDOMINAL PAIN: 0
DIZZINESS: 0
WOUND: 1
PALPITATIONS: 0
COUGH: 0
FEVER: 0
CHILLS: 0

## 2020-08-22 NOTE — PHARMACY - DISCHARGE MEDICATION RECONCILIATION AND EDUCATION
Pharmacy:  Discharge Counseling and Medication Reconciliation    Lucila Willett  [address] [address]  [phone] (home)   65 year old female  PCP: No Ref-Primary, Physician    Allergies: Cetirizine; Pregabalin; Latex; Rosiglitazone; and Zosyn [piperacillin-tazobactam in d5w]    Discharge Counseling:    Patient is returning to [facility], where nursing staff administer medications.  Pharmacist did NOT meet with patient today to review the medication portion of the After Visit Summary.    Summary of Education: provided printed handout for nursing staff on doxycycline    Materials Provided:  MedCounselor sheets printed from Clinical Pharmacology on: doxycycline capsules    Discharge Medication Reconciliation:    It has been determined that the patient has an adequate supply of medications available or which can be obtained from the patient's preferred pharmacy, which she has confirmed as: [place] [id number]. An updated medication list will be faxed to the patient's pharmacy with a note to call if corrections needed. Called Chrissie at [facility] and asked them to call if questions or changes needed.    Thank you for the consult.    Trudi Teresa RP........August 22, 2020 11:25 AM    Per [facility], corrected Oxycodone to every 6 hours.  Per [facility] corrected levothyroxine to 200 mcg plus 12.5 mcg (1/2 of 25 mcg)  refaxed letter to [facility]    Trudi Teresa RPH on 8/22/2020 at 3:37 PM    Called and reviewed above with pharmacy technician at  [place] [address].  Trudi Teresa RPH on 8/22/2020 at 3:44 PM    Faxed updated list to [facility]  Trudi Teresa RPH on 8/22/2020 at 3:55 PM

## 2020-08-22 NOTE — PROGRESS NOTES
"Late entry for 2000  [facility] ADMISSION NOTE    Patient admitted to room 331 at approximately 1930 via cart from emergency room. Patient was accompanied by transport tech.     Verbal SBAR report received from Tereso STEWART prior to patient arrival.     Patient trasferred to bed via staff Patient alert and oriented X 3. Pain is controlled without any medications. 0-10 Pain Scale: 0. Admission vital signs: Blood pressure (!) 123/34, pulse 82, temperature 97.6  F (36.4  C), temperature source Tympanic, resp. rate 16, height 1.626 m (5' 4\"), weight 127 kg (280 lb), SpO2 96 %, not currently breastfeeding. Patient was oriented to plan of care, call light, bed controls, tv, telephone, bathroom and visiting hours.     Risk Assessment    The following safety risks were identified during admission: fall and skin. Yellow risk band applied: YES.     Skin Initial Assessment    This writer admitted this patient and completed a full skin assessment and Isai score in the Adult PCS flowsheet. Appropriate interventions initiated as needed.     Secondary skin check completed by Nidhi.    Isai Risk Assessment  Sensory Perception: 4-->no impairment  Moisture: 4-->rarely moist  Activity: 3-->walks occasionally  Mobility: 3-->slightly limited  Nutrition: 3-->adequate  Friction and Shear: 3-->no apparent problem  Isai Score: 20    Education    Patient has a [place] to Observation order: Yes  Observation education completed and documented: Yes      Nidhi Marie RN    "

## 2020-08-22 NOTE — H&P
[facility]    History and Physical - Hospitalist Service       Date of Admission:  8/21/2020    Assessment & Plan   Lucila Willett is a 65 year old female admitted on 8/21/2020. She was admitted for left upper extremity cellulitis and abscess.    Cellulitis and Abscess  Assessment:  Mild leukocytosis, US, and physical exam consistent with cellulitis and abscess.  S/P I&D per general surgery.  Plan:  - Vancomycin.  - Wound culture pending.  Follow.    ESRD on Hemodialysis  Assessment:  Completed dialysis as scheduled today.  Plan:  - Resume dialysis schedule on Monday.    Insulin-Dependent Diabetes Mellitus  Assessment:  Hgb A1c 6.3% on 11/27; well-controlled.  Plan:  - Continue home Lantus 27 units BID.  - Continue home sliding scale insulin.  - Continue home Atorvastatin 80 mg daily.    Hypertension  Assessment:  Normotensive.  Plan:  - Continue home Metoprolol 25 mg BID.  - Continue home Torsemide 80 mg daily.    Hypothyroidism  Assessment:  Stable.  Plan:  - Continue home Levothyroxine 225 mcg daily.    GERD  Assessment:  Stable.  Plan:  - Continue home Ranitidine 150 mg daily.    Gout  Assessment:  Stable.  Plan:  - Continue home Allopurinol.     Diet: Regular  DVT Prophylaxis: Ambulate every shift  Mejia Catheter: not present  Code Status: Full Code           Disposition Plan   Expected discharge: Tomorrow, recommended to prior living arrangement once antibiotic plan established.  Entered: Khushbu Younger DO 08/22/2020, 12:18 AM     The patient's care was discussed with the Patient.    Khushbu Younger DO  [facility]    ______________________________________________________________________    Chief Complaint   LUE redness and swelling    History is obtained from the patient    History of Present Illness   Lucila Willett is a 65 year old female with past medical history of insulin dependent diabetes mellitus, ESRD on hemodialysis MWF, and hypothyroidism who  presented with complaint of redness and swelling of her left upper extremity.  She recently underwent AV fistula revision of her left arm.  She reports that the redness and swelling had been present for the past couple of days but that it worsened yesterday.  She went to dialysis today, as scheduled, and was able to participate in a full session.  She was encouraged to go to the emergency department afterward to have her arm evaluated.    Work-up in the ED revealed mild leukocytosis and a low echogenicity mass in the antecubital fossa suspicious for an abscess.  General surgery was contacted and performed I&D of the abscess with purulent drainage.  Wound culture has been collected and is pending.    Review of Systems    Review of Systems   Constitutional: Negative for chills and fever.   HENT: Negative for congestion, rhinorrhea and sore throat.    Respiratory: Negative for cough and shortness of breath.    Cardiovascular: Negative for chest pain and palpitations.   Gastrointestinal: Negative for abdominal pain, nausea and vomiting.   Musculoskeletal: Negative for arthralgias.   Skin: Positive for wound.   Neurological: Negative for dizziness, light-headedness and headaches.     Past Medical History    I have reviewed this patient's medical history and updated it with pertinent information if needed.   Past Medical History:   Diagnosis Date     Atherosclerotic heart disease of native coronary artery without angina pectoris     No Comments Provided     Cellulitis     No Comments Provided     Chronic kidney disease     No Comments Provided     Diastolic congestive heart failure (H)     No Comments Provided     Hypothyroidism     No Comments Provided     Major depressive disorder, single episode     No Comments Provided     Obesity     No Comments Provided     Personal history of other medical treatment (CODE)     No Comments Provided     Sleep apnea     No Comments Provided     Type 2 diabetes mellitus without  "complications (H)     No Comments Provided     Past Surgical History   I have reviewed this patient's surgical history and updated it with pertinent information if needed.  Past Surgical History:   Procedure Laterality Date     CHOLECYSTECTOMY      No Comments Provided     OTHER SURGICAL HISTORY      154673,OTHER     OTHER SURGICAL HISTORY      96397.0,[place] SPINE FUSION ANTER 3 SGMTS     OTHER SURGICAL HISTORY      LOC-1006.04,CABG     OTHER SURGICAL HISTORY      [id number],ENDOSCOPY GI     Social History   I have reviewed this patient's social history and updated it with pertinent information if needed.  Social History     Tobacco Use     Smoking status: Former Smoker     Packs/day: 2.00     Years: 20.00     Pack years: 40.00     Types: Cigarettes     Last attempt to quit: 1997     Years since quittin.9     Smokeless tobacco: Never Used   Substance Use Topics     Alcohol use: Yes     Alcohol/week: 0.0 standard drinks     Comment: Alcoholic Drinks/day: Occasionally     Drug use: Unknown     Types: Other     Comment: Drug use: No     Prior to Admission Medications   Prior to Admission Medications   Prescriptions Last Dose Informant Patient Reported? Taking?   Cholecalciferol (D 5000) 5000 UNITS TABS   Yes No   Sig: Take 5,000 Units by mouth daily   FLUoxetine (PROZAC) 20 MG capsule   Yes No   Sig: TAKE 2 CAPSULES ORALLY   TWO TIMES A DAY   HYDROcodone-acetaminophen (NORCO)  MG per tablet   Yes No   Sig: Take 1-2 tablets by mouth daily as needed for pain   Needle, Disp, 31G X \" MISC   Yes No   Sig: Pen needle   allopurinol (ZYLOPRIM) 100 MG tablet   Yes No   Sig: Take 200 mg by mouth daily   aspirin (GOODSENSE ASPIRIN) 325 MG tablet   Yes No   Sig: Take 325 mg by mouth daily With a meal   atorvastatin (LIPITOR) 80 MG tablet   Yes No   Sig: Take 80 mg by mouth every evening   blood glucose (NO BRAND SPECIFIED) lancets standard   Yes No   Sig: Test blood sugar 3 times daily and as needed for signs of high " or low blood sugar.   blood glucose monitoring (NO BRAND SPECIFIED) test strip   Yes No   Sig: TEST BLOOD SUGARS THREE  TIMES A DAY AND AS NEEDED   buPROPion (WELLBUTRIN XL) 150 MG 24 hr tablet   Yes No   Sig: Take 3 tablets by mouth daily *SWALLOW WHOLE. DO NOT CRUSH OR CHEW*   calcitRIOL (ROCALTROL) 0.25 MCG capsule   Yes No   Sig: Take 1 capsule by mouth every other day   calcium carbonate 750 MG CHEW   Yes No   Sig: Take 1-2 tablets by mouth daily as needed for heartburn   clotrimazole (LOTRIMIN) 1 % cream   Yes No   Sig: APPLY TO AFFECTED AREAS  TOPICALLY TWO TIMES A DAY AS NEEDED FOR YEAST INFECTION   fluticasone (FLONASE) 50 MCG/ACT spray   Yes No   Sig: Spray 2 sprays into both nostrils daily as needed for allergies   gabapentin (NEURONTIN) 300 MG capsule   Yes No   Sig: Take 300 mg by mouth At Bedtime   insulin aspart (NOVOLOG PEN) 100 UNIT/ML injection   Yes No   Sig: Inject 10-20 Units Subcutaneous Inject 10-20 Units subcutaneous 4 times daily before meals and at bedtime. CARB RATIO 1 UNIT PER 5 CARBS   insulin glargine (LANTUS) 100 UNIT/ML injection   Yes No   Sig: Inject 60 Units Subcutaneous every morning   levothyroxine (SYNTHROID/LEVOTHROID) 200 MCG tablet   Yes No   Sig: Take 1 tablet by mouth daily Take on empty stomach with plenty of water. Take with 25 microgram tablet.   levothyroxine (SYNTHROID/LEVOTHROID) 25 MCG tablet   Yes No   Sig: Take 1 tablet by mouth daily Take with 200 microgram tablet.   loratadine (CLARITIN) 10 MG tablet   Yes No   Sig: Take 10 mg by mouth daily as needed for allergies   metoprolol tartrate (LOPRESSOR) 25 MG tablet   Yes No   Sig: Take 25 mg by mouth 2 times daily   montelukast (SINGULAIR) 10 MG tablet   Yes No   Sig: Take 10 mg by mouth At Bedtime   nitroGLYcerin (NITROSTAT) 0.4 MG sublingual tablet   Yes No   Sig: Place 0.4 mg under the tongue Place 0.4 mg under the tongue every 5 minutes if needed. Patient not using at this time.   Indications: ANGINA   oxyCODONE  IR (ROXICODONE) 5 MG tablet   Yes No   Sig: Take 5 mg by mouth every 4 hours as needed for pain   ranitidine (ZANTAC) 150 MG tablet   Yes No   Sig: Take 150 mg by mouth daily   torsemide (DEMADEX) 20 MG tablet   Yes No   Sig: Take 80 mg by mouth daily      Facility-Administered Medications: None     Allergies   Allergies   Allergen Reactions     Cetirizine      Other reaction(s): *Unknown - Pt Doesn't Remember     Pregabalin      Other reaction(s): Other - Describe In Comment Field  Floaters in eyes.      Latex Rash     Patient states she gets a rash     Rosiglitazone Rash     Patient states she gets a rash     Zosyn [Piperacillin-Tazobactam In D5w] Rash     Physical Exam   Vital Signs: Temp: 97.6  F (36.4  C) Temp src: Tympanic BP: (!) 123/34 Pulse: 82   Resp: 16 SpO2: 96 % O2 Device: None (Room air)    Weight: 280 lbs 0 oz  Physical Exam  Constitutional:       General: She is not in acute distress.     Appearance: Normal appearance. She is not ill-appearing.   Eyes:      General:         Right eye: No discharge.         Left eye: No discharge.   Neck:      Musculoskeletal: Neck supple. No muscular tenderness.   Cardiovascular:      Rate and Rhythm: Normal rate and regular rhythm.   Pulmonary:      Effort: Pulmonary effort is normal.      Breath sounds: Normal breath sounds. No wheezing, rhonchi or rales.   Abdominal:      General: Bowel sounds are normal.      Palpations: Abdomen is soft.      Tenderness: There is no abdominal tenderness. There is no guarding or rebound.   Musculoskeletal:      Comments: Trace edema LLE and 1+ edema RLE.   Lymphadenopathy:      Cervical: No cervical adenopathy.   Skin:     Comments: Area of warmth and erythema in antecubital fossa.  Dressing C/D/I over incision and drainage site.   Neurological:      General: No focal deficit present.      Mental Status: She is alert.   Psychiatric:         Mood and Affect: Mood normal.       Data   Data reviewed today: I reviewed all medications,  new labs and imaging results over the last 24 hours. I personally reviewed no images or EKG's today.    Recent Labs   Lab 08/21/20  1500   WBC 12.7*   HGB 11.9   *         POTASSIUM 4.1   CHLORIDE 89*   CO2 36*   BUN 12   CR 2.64*   ANIONGAP 10   KEYA 9.5   *   ALBUMIN 4.4   PROTTOTAL 7.9   BILITOTAL 0.7   ALKPHOS 163*   ALT 43   AST 27     Recent Results (from the past 24 hour(s))   US Upper Extremity Venous Duplex Left    Narrative    PROCEDURE: US UPPER EXTREMITY VENOUS DUPLEX LEFT 8/21/2020 5:47 PM    HISTORY: increased redness, warmth purulent drainage at AV fisutla  revision site. Abscess? DVT?    COMPARISONS: None.    TECHNIQUE: Ultrasound of the veins of the left upper extremity    FINDINGS: There is a large low echogenicity collection in the  antecubital fossa region on the left. In light of the history of pain  and redness this may represent an abscess. The brachial axillary and  subclavian veins are patent.         Impression    IMPRESSION: Low echogenicity mass in the antecubital fossa suspicious  for an abscess.    JOSÉ MIGUEL MARIE MD

## 2020-08-22 NOTE — DISCHARGE SUMMARY
[facility]  Hospitalist Discharge Summary      Date of Admission:  8/21/2020  Date of Discharge:  8/22/2020  Discharging Provider: Khushbu Younger DO      Discharge Diagnoses   Abscess of Left Arm  Cellulitis of Left Upper Extremity  ESRD on Hemodialysis    Follow-ups Needed After Discharge   Follow-up Appointments     Follow-up and recommended labs and tests       Follow up with primary care provider, Physician No Ref-Primary, within   3-4 days, for hospital follow-up and wound recheck. No follow up labs or   test are needed.             Unresulted Labs Ordered in the Past 30 Days of this Admission     Date and Time Order Name Status Description    8/21/2020 1956 Wound Culture Aerobic Bacterial In process       These results will be followed up by discharging physician.    Discharge Disposition   Discharged to home  Condition at discharge: Stable    Hospital Course   Lucila Willett is a 65 year old female with past medical history of insulin dependent diabetes mellitus, ESRD on hemodialysis on a M-W-F schedule, and hypothyroidism who presented with complaint of redness and swelling of her left upper extremity.  She had recently undergone AV fistula revision of her left arm.  She reports that the redness and swelling had been present for a couple of days but that it worsened the day prior to admission.  She went to dialysis as scheduled and was able to participate in a full session.  She was encouraged to go to the emergency department afterward to have her arm evaluated.     Work-up in the ED revealed mild leukocytosis and a low echogenicity mass in the antecubital fossa suspicious for an abscess.  General surgery was contacted and performed I&D of the abscess with purulent drainage.  Infection was isolated within subcutaneous tissues and did not appear to extend to her AV fistula.  She received one dose of Vancomycin while inpatient and was discharged on Doxycycline to complete her  antibiotic course.  Wound culture was collected and still pending at time of discharge.    Code Status   Full Code    Time Spent on this Encounter   I, Khushbu Younger DO, personally saw the patient today and spent less than or equal to 30 minutes discharging this patient.       Khushbu Younger DO  [facility]  ______________________________________________________________________    Physical Exam   Vital Signs: Temp: 97.5  F (36.4  C) Temp src: Tympanic BP: 114/40 Pulse: 74   Resp: 20 SpO2: 96 % O2 Device: None (Room air)    Weight: 280 lbs 0 oz  General Appearance: Alert. No acute distress.  Respiratory: Normal rate and effort. CTAB.  Cardiovascular: Regular rate and rhythm.  GI: Soft. Non-tender. Non-distended. Normal bowel sounds.  Skin: Incision and drainage site examined.  Iodoform gauze packing in place.  Mild surrounding erythema, improved from previous exam.  No purulent drainage.  Bandage clean, dry, and intact.  Psych: Normal mood and affect.       Primary Care Physician   Physician No Ref-Primary    Discharge Orders      Reason for your hospital stay    Abscess, Cellulitis     Follow-up and recommended labs and tests     Follow up with primary care provider, Physician No Ref-Primary, within 3-4 days, for hospital follow-up and wound recheck. No follow up labs or test are needed.     Activity    Your activity upon discharge: activity as tolerated     Wound care and dressings    Instructions to care for your wound at home: daily dressing changes.     Diet    Follow this diet upon discharge: Diabetic (1800 ADA)     Significant Results and Procedures   Most Recent 3 CBC's:  Recent Labs   Lab Test 08/21/20  1500 12/05/19  1145 11/27/19  0800   WBC 12.7* 23.0* 8.3   HGB 11.9 9.7* 10.4*   * 98 99    322 274     Most Recent 3 BMP's:  Recent Labs   Lab Test 08/21/20  1500 12/05/19  1145 11/27/19  0800    130* 135   POTASSIUM 4.1 4.5 4.5   CHLORIDE 89* 89* 95*   CO2 36*  28 31   BUN 12 25 22   CR 2.64* 3.47* 2.81*   ANIONGAP 10 13 9   KEYA 9.5 9.4 9.3   * 220* 104   ,   Results for orders placed or performed during the hospital encounter of 08/21/20   US Upper Extremity Venous Duplex Left    Narrative    PROCEDURE: US UPPER EXTREMITY VENOUS DUPLEX LEFT 8/21/2020 5:47 PM    HISTORY: increased redness, warmth purulent drainage at AV fisutla  revision site. Abscess? DVT?    COMPARISONS: None.    TECHNIQUE: Ultrasound of the veins of the left upper extremity    FINDINGS: There is a large low echogenicity collection in the  antecubital fossa region on the left. In light of the history of pain  and redness this may represent an abscess. The brachial axillary and  subclavian veins are patent.         Impression    IMPRESSION: Low echogenicity mass in the antecubital fossa suspicious  for an abscess.    JOSÉ MIGUEL MARIE MD       Discharge Medications   Current Discharge Medication List      START taking these medications    Details   doxycycline hyclate (VIBRAMYCIN) 100 MG capsule Take 1 capsule (100 mg) by mouth every 12 hours for 10 days  Qty: 20 capsule, Refills: 0    Associated Diagnoses: Abscess of left arm; Cellulitis of left upper extremity         CONTINUE these medications which have NOT CHANGED    Details   acetaminophen (TYLENOL) 500 MG tablet Take 1,000 mg by mouth 3 times daily      aspirin (GOODSENSE ASPIRIN) 325 MG tablet Take 325 mg by mouth daily With a meal      B Complex-C-Folic Acid (DIALYVITE 800 PO) Take 1 tablet by mouth daily      buPROPion (WELLBUTRIN XL) 150 MG 24 hr tablet Take 3 tablets by mouth daily *SWALLOW WHOLE. DO NOT CRUSH OR CHEW*      Cholecalciferol (D 5000) 5000 UNITS TABS Take 5,000 Units by mouth daily      fexofenadine (ALLEGRA) 60 MG tablet Take 60 mg by mouth daily      FLUoxetine (PROZAC) 20 MG capsule TAKE 2 CAPSULES ORALLY   TWO TIMES A DAY      fluticasone (FLONASE) 50 MCG/ACT spray Spray 2 sprays into both nostrils daily as needed for  allergies      gabapentin (NEURONTIN) 100 MG capsule Take 100 mg by mouth At Bedtime      hypromellose (GENTEAL) 0.3 % SOLN ophthalmic solution Place 1 drop into both eyes 2 times daily      !! insulin aspart (NOVOLOG PEN) 100 UNIT/ML injection Inject 19 Units Subcutaneous 3 times daily (with meals)       !! insulin aspart (NOVOLOG PEN) 100 UNIT/ML pen Inject under skin as directed per sliding scale  If Blood Sugar:  161-190= 2 units  191-220= 3 units  221-250= 4 units  251-280= 5 units  281-310= 6 units  311-340= 7 units  341-370= 8 units  371-400= 9 units  401-430= 10 units  431-480= 11 units  481-490= 12 units  491-520= 13 units  521-550= 14 units  551-580= 15 units  581-610= 16 units  611-800= 17 units      insulin detemir (LEVEMIR PEN) 100 UNIT/ML pen Inject 27 Units Subcutaneous 2 times daily      !! levothyroxine (SYNTHROID/LEVOTHROID) 200 MCG tablet Take 1 tablet by mouth daily Take on empty stomach with plenty of water. Take with 25 microgram tablet.      !! levothyroxine (SYNTHROID/LEVOTHROID) 25 MCG tablet Take 1 tablet by mouth daily Take with 200 microgram tablet.      Lidocaine (LIDOCARE) 4 % Patch Place 1 patch onto the skin every 24 hours To prevent lidocaine toxicity, patient should be patch free for 12 hrs daily.    On in AM  Off at HS      lidocaine-prilocaine (EMLA) 2.5-2.5 % external cream Apply topically Every Mon, Wed, Fri Morning Before dialysis      metoprolol tartrate (LOPRESSOR) 25 MG tablet Take 25 mg by mouth 2 times daily Hold for pulse <55  Hold for SBP <100      senna-docusate (SENOKOT-S/PERICOLACE) 8.6-50 MG tablet Take 1 tablet by mouth daily      sevelamer carbonate (RENVELA) 800 MG tablet Take 2,400 mg by mouth 3 times daily (with meals)      traMADol (ULTRAM) 50 MG tablet Take 50 mg by mouth every 12 hours as needed for severe pain      allopurinol (ZYLOPRIM) 100 MG tablet Take 100 mg by mouth daily       alum & mag hydroxide-simethicone (MAALOX) 200-200-20 MG/5ML SUSP suspension  "Take 15 mLs by mouth every 4 hours as needed for indigestion      atorvastatin (LIPITOR) 80 MG tablet Take 80 mg by mouth every evening      blood glucose (NO BRAND SPECIFIED) lancets standard Test blood sugar 3 times daily and as needed for signs of high or low blood sugar.      blood glucose monitoring (NO BRAND SPECIFIED) test strip TEST BLOOD SUGARS THREE  TIMES A DAY AND AS NEEDED      clotrimazole (LOTRIMIN) 1 % cream APPLY TO AFFECTED AREAS  TOPICALLY TWO TIMES A DAY AS NEEDED FOR YEAST INFECTION      guaiFENesin (ROBITUSSIN) 20 mg/mL SOLN solution Take 10 mLs by mouth every 4 hours as needed for cough      HYDROcodone-acetaminophen (NORCO)  MG per tablet Take 1 tablet by mouth every 6 hours as needed for pain       ipratropium - albuterol 0.5 mg/2.5 mg/3 mL (DUONEB) 0.5-2.5 (3) MG/3ML neb solution Take 1 vial by nebulization every 4 hours as needed for shortness of breath / dyspnea or wheezing      magnesium hydroxide (MILK OF MAGNESIA) 400 MG/5ML suspension Take 30 mLs by mouth daily as needed for constipation (If no bowel movement in 3 days)      montelukast (SINGULAIR) 10 MG tablet Take 10 mg by mouth At Bedtime      Needle, Disp, 31G X 5/16\" MISC Pen needle      nitroGLYcerin (NITROSTAT) 0.4 MG sublingual tablet Place 0.4 mg under the tongue Place 0.4 mg under the tongue every 5 minutes if needed. Patient not using at this time.   Indications: ANGINA      nystatin (MYCOSTATIN) 057530 UNIT/GM external powder Apply topically 2 times daily as needed      ondansetron (ZOFRAN) 4 MG tablet Take 4 mg by mouth every 8 hours as needed for nausea      oxyCODONE IR (ROXICODONE) 5 MG tablet Take one tablet by mouth every 5 hours as needed for pain       !! - Potential duplicate medications found. Please discuss with provider.      STOP taking these medications       insulin glargine (LANTUS) 100 UNIT/ML injection Comments:   Reason for Stopping:         ranitidine (ZANTAC) 150 MG tablet Comments:   Reason for " Stopping:         torsemide (DEMADEX) 20 MG tablet Comments:   Reason for Stopping:             Allergies   Allergies   Allergen Reactions     Cetirizine      Other reaction(s): *Unknown - Pt Doesn't Remember     Pregabalin      Other reaction(s): Other - Describe In Comment Field  Floaters in eyes.      Latex Rash     Patient states she gets a rash     Rosiglitazone Rash     Patient states she gets a rash     Zosyn [Piperacillin-Tazobactam In D5w] Rash

## 2020-08-22 NOTE — PROGRESS NOTES
[facility] DISCHARGE NOTE    Patient discharged to nursing home at 11:16 AM via wheel chair. Accompanied by other:transport aide and staff. Discharge instructions reviewed with patient and nurse to nurse completed with Chrissie at [facility] opportunity offered to ask questions. Prescriptions sent to patients preferred pharmacy. All belongings sent with patient.    Mary Kay Lowe RN

## 2020-08-22 NOTE — PHARMACY - DISCHARGE MEDICATION RECONCILIATION AND EDUCATION
[facility]  Part of [facility]  [address] [place] [place] [address]    August 22, 2020    Dear Pharmacist,    Your customer, Lucila Willett, born on 1955, was recently discharged from [facility].  We have updated her medication list and want to alert you to the following:       Review of your medicines      START taking      Dose / Directions   doxycycline hyclate 100 MG capsule  Commonly known as:  VIBRAMYCIN  Indication:  Abscess  Used for:  Abscess of left arm, Cellulitis of left upper extremity      Dose:  100 mg  Start taking on:  August 23, 2020  Take 1 capsule (100 mg) by mouth every 12 hours for 10 days  Quantity:  20 capsule  Refills:  0        CONTINUE these medicines which have NOT CHANGED      Dose / Directions   acetaminophen 500 MG tablet  Commonly known as:  TYLENOL      Dose:  1,000 mg  Take 1,000 mg by mouth 3 times daily  Refills:  0     allopurinol 100 MG tablet  Commonly known as:  ZYLOPRIM      Dose:  100 mg  Take 100 mg by mouth daily  Refills:  0     alum & mag hydroxide-simethicone 200-200-20 MG/5ML Susp suspension  Commonly known as:  MAALOX      Dose:  15 mL  Take 15 mLs by mouth every 4 hours as needed for indigestion  Refills:  0     atorvastatin 80 MG tablet  Commonly known as:  LIPITOR      Dose:  80 mg  Take 80 mg by mouth every evening  Refills:  0     blood glucose lancets standard  Commonly known as:  NO BRAND SPECIFIED      Test blood sugar 3 times daily and as needed for signs of high or low blood sugar.  Refills:  0     blood glucose test strip  Commonly known as:  NO BRAND SPECIFIED      TEST BLOOD SUGARS THREE  TIMES A DAY AND AS NEEDED  Refills:  0     buPROPion 150 MG 24 hr tablet  Commonly known as:  WELLBUTRIN XL      Dose:  3 tablet  Take 3 tablets by mouth daily *SWALLOW WHOLE. DO NOT CRUSH OR CHEW*  Refills:  0     clotrimazole 1 % external cream  Commonly known as:  LOTRIMIN      APPLY TO AFFECTED AREAS   TOPICALLY TWO TIMES A DAY AS NEEDED FOR YEAST INFECTION  Refills:  0     D 5000 125 MCG (5000 UT) Tabs  Generic drug:  Cholecalciferol      Dose:  5,000 Units  Take 5,000 Units by mouth daily  Refills:  0     DIALYVITE 800 PO      Dose:  1 tablet  Take 1 tablet by mouth daily  Refills:  0     fexofenadine 60 MG tablet  Commonly known as:  ALLEGRA      Dose:  60 mg  Take 60 mg by mouth daily  Refills:  0     FLUoxetine 20 MG capsule  Commonly known as:  PROzac      TAKE 2 CAPSULES ORALLY   TWO TIMES A DAY  Refills:  0     fluticasone 50 MCG/ACT nasal spray  Commonly known as:  FLONASE      Dose:  2 spray  Spray 2 sprays into both nostrils daily as needed for allergies  Refills:  0     gabapentin 100 MG capsule  Commonly known as:  NEURONTIN      Dose:  100 mg  Take 100 mg by mouth At Bedtime  Refills:  0     GoodSense Aspirin 325 MG tablet  Generic drug:  aspirin      Dose:  325 mg  Take 325 mg by mouth daily With a meal  Refills:  0     guaiFENesin 20 mg/mL Soln solution  Commonly known as:  ROBITUSSIN      Dose:  10 mL  Take 10 mLs by mouth every 4 hours as needed for cough  Refills:  0     HYDROcodone-acetaminophen  MG per tablet  Commonly known as:  NORCO      Dose:  1 tablet  Take 1 tablet by mouth every 6 hours as needed for pain  Refills:  0     hypromellose 0.3 % Soln ophthalmic solution  Commonly known as:  GENTEAL      Dose:  1 drop  Place 1 drop into both eyes 2 times daily  Refills:  0     * insulin aspart 100 UNIT/ML pen  Commonly known as:  NovoLOG PEN      Dose:  19 Units  Inject 19 Units Subcutaneous 3 times daily (with meals)  Refills:  0     * insulin aspart 100 UNIT/ML pen  Commonly known as:  NovoLOG PEN      Inject under skin as directed per sliding scale  If Blood Sugar:  161-190= 2 units  191-220= 3 units  221-250= 4 units  251-280= 5 units  281-310= 6 units  311-340= 7 units  341-370= 8 units  371-400= 9 units  401-430= 10 units  431-480= 11 units  481-490= 12 units  491-520= 13  "units  521-550= 14 units  551-580= 15 units  581-610= 16 units  611-800= 17 units  Refills:  0     insulin detemir 100 UNIT/ML pen  Commonly known as:  LEVEMIR PEN      Dose:  27 Units  Inject 27 Units Subcutaneous 2 times daily  Refills:  0     ipratropium - albuterol 0.5 mg/2.5 mg/3 mL 0.5-2.5 (3) MG/3ML neb solution  Commonly known as:  DUONEB      Dose:  1 vial  Take 1 vial by nebulization every 4 hours as needed for shortness of breath / dyspnea or wheezing  Refills:  0     * levothyroxine 25 MCG tablet  Commonly known as:  SYNTHROID/LEVOTHROID      Dose:  1 tablet  Take 1 tablet by mouth daily Take with 200 microgram tablet.  Refills:  0     * levothyroxine 200 MCG tablet  Commonly known as:  SYNTHROID/LEVOTHROID      Dose:  1 tablet  Take 1 tablet by mouth daily Take on empty stomach with plenty of water. Take with 25 microgram tablet.  Refills:  0     Lidocaine 4 % Patch  Commonly known as:  LIDOCARE      Dose:  1 patch  Place 1 patch onto the skin every 24 hours To prevent lidocaine toxicity, patient should be patch free for 12 hrs daily.    On in AM  Off at HS  Refills:  0     lidocaine-prilocaine 2.5-2.5 % external cream  Commonly known as:  EMLA      Apply topically Every Mon, Wed, Fri Morning Before dialysis  Refills:  0     magnesium hydroxide 400 MG/5ML suspension  Commonly known as:  MILK OF MAGNESIA      Dose:  30 mL  Take 30 mLs by mouth daily as needed for constipation (If no bowel movement in 3 days)  Refills:  0     metoprolol tartrate 25 MG tablet  Commonly known as:  LOPRESSOR      Dose:  25 mg  Take 25 mg by mouth 2 times daily Hold for pulse <55  Hold for SBP <100  Refills:  0     montelukast 10 MG tablet  Commonly known as:  SINGULAIR      Dose:  10 mg  Take 10 mg by mouth At Bedtime  Refills:  0     Needle (Disp) 31G X 5/16\" Misc      Pen needle  Refills:  0     nitroGLYcerin 0.4 MG sublingual tablet  Commonly known as:  NITROSTAT      Dose:  0.4 mg  Place 0.4 mg under the tongue Place 0.4 " mg under the tongue every 5 minutes if needed. Patient not using at this time.   Indications: ANGINA  Refills:  0     nystatin 686340 UNIT/GM external powder  Commonly known as:  MYCOSTATIN      Apply topically 2 times daily as needed  Refills:  0     ondansetron 4 MG tablet  Commonly known as:  ZOFRAN      Dose:  4 mg  Take 4 mg by mouth every 8 hours as needed for nausea  Refills:  0     oxyCODONE 5 MG tablet  Commonly known as:  ROXICODONE      Take one tablet by mouth every 5 hours as needed for pain  Refills:  0     senna-docusate 8.6-50 MG tablet  Commonly known as:  SENOKOT-S/PERICOLACE      Dose:  1 tablet  Take 1 tablet by mouth daily  Refills:  0     sevelamer carbonate 800 MG tablet  Commonly known as:  RENVELA      Dose:  2,400 mg  Take 2,400 mg by mouth 3 times daily (with meals)  Refills:  0     traMADol 50 MG tablet  Commonly known as:  ULTRAM      Dose:  50 mg  Take 50 mg by mouth every 12 hours as needed for severe pain  Refills:  0         * This list has 4 medication(s) that are the same as other medications prescribed for you. Read the directions carefully, and ask your doctor or other care provider to review them with you.               Where to get your medicines      These medications were sent to [id number] - [place], [place] - [address]  [address] [facility] [place] [facility] [address]    Phone:  [phone]     doxycycline hyclate 100 MG capsule         We also reviewed Lucila Willett's allergy list and updated it as needed:  Allergies: Cetirizine; Pregabalin; Latex; Rosiglitazone; and Zosyn [piperacillin-tazobactam in d5w]    Thank you for continuing to care for Lucila Willett.  We look forward to working together with you in the future.    Sincerely,  Trudi Teresa, [facility]

## 2020-08-22 NOTE — PHARMACY - DISCHARGE MEDICATION RECONCILIATION AND EDUCATION
[facility]  Part of [facility]  [address] [place] [address]    August 22, 2020    Dear Pharmacist,    Your customer, Lucila Willett, born on 1955, was recently discharged from [facility].  We have updated her medication list and want to alert you to the following:       Review of your medicines      START taking      Dose / Directions   doxycycline hyclate 100 MG capsule  Commonly known as:  VIBRAMYCIN  Indication:  Abscess  Used for:  Abscess of left arm, Cellulitis of left upper extremity      Dose:  100 mg  Start taking on:  August 23, 2020  Take 1 capsule (100 mg) by mouth every 12 hours for 10 days  Quantity:  20 capsule  Refills:  0        CONTINUE these medicines which have NOT CHANGED      Dose / Directions   acetaminophen 500 MG tablet  Commonly known as:  TYLENOL      Dose:  1,000 mg  Take 1,000 mg by mouth 3 times daily  Refills:  0     allopurinol 100 MG tablet  Commonly known as:  ZYLOPRIM      Dose:  100 mg  Take 100 mg by mouth daily  Refills:  0     alum & mag hydroxide-simethicone 200-200-20 MG/5ML Susp suspension  Commonly known as:  MAALOX      Dose:  15 mL  Take 15 mLs by mouth every 4 hours as needed for indigestion  Refills:  0     atorvastatin 80 MG tablet  Commonly known as:  LIPITOR      Dose:  80 mg  Take 80 mg by mouth every evening  Refills:  0     blood glucose lancets standard  Commonly known as:  NO BRAND SPECIFIED      Test blood sugar 3 times daily and as needed for signs of high or low blood sugar.  Refills:  0     blood glucose test strip  Commonly known as:  NO BRAND SPECIFIED      TEST BLOOD SUGARS THREE  TIMES A DAY AND AS NEEDED  Refills:  0     buPROPion 150 MG 24 hr tablet  Commonly known as:  WELLBUTRIN XL      Dose:  3 tablet  Take 3 tablets by mouth daily *SWALLOW WHOLE. DO NOT CRUSH OR CHEW*  Refills:  0     clotrimazole 1 % external cream  Commonly known as:  LOTRIMIN      APPLY TO AFFECTED AREAS   TOPICALLY TWO TIMES A DAY AS NEEDED FOR YEAST INFECTION  Refills:  0     D 5000 125 MCG (5000 UT) Tabs  Generic drug:  Cholecalciferol      Dose:  5,000 Units  Take 5,000 Units by mouth daily  Refills:  0     DIALYVITE 800 PO      Dose:  1 tablet  Take 1 tablet by mouth daily  Refills:  0     fexofenadine 60 MG tablet  Commonly known as:  ALLEGRA      Dose:  60 mg  Take 60 mg by mouth daily  Refills:  0     FLUoxetine 20 MG capsule  Commonly known as:  PROzac      TAKE 2 CAPSULES ORALLY   TWO TIMES A DAY  Refills:  0     fluticasone 50 MCG/ACT nasal spray  Commonly known as:  FLONASE      Dose:  2 spray  Spray 2 sprays into both nostrils daily as needed for allergies  Refills:  0     gabapentin 100 MG capsule  Commonly known as:  NEURONTIN      Dose:  100 mg  Take 100 mg by mouth At Bedtime  Refills:  0     GoodSense Aspirin 325 MG tablet  Generic drug:  aspirin      Dose:  325 mg  Take 325 mg by mouth daily With a meal  Refills:  0     guaiFENesin 20 mg/mL Soln solution  Commonly known as:  ROBITUSSIN      Dose:  10 mL  Take 10 mLs by mouth every 4 hours as needed for cough  Refills:  0     HYDROcodone-acetaminophen  MG per tablet  Commonly known as:  NORCO      Dose:  1 tablet  Take 1 tablet by mouth every 6 hours as needed for pain  Refills:  0     hypromellose 0.3 % Soln ophthalmic solution  Commonly known as:  GENTEAL      Dose:  1 drop  Place 1 drop into both eyes 2 times daily  Refills:  0     * insulin aspart 100 UNIT/ML pen  Commonly known as:  NovoLOG PEN      Dose:  19 Units  Inject 19 Units Subcutaneous 3 times daily (with meals)  Refills:  0     * insulin aspart 100 UNIT/ML pen  Commonly known as:  NovoLOG PEN      Inject under skin as directed per sliding scale  If Blood Sugar:  161-190= 2 units  191-220= 3 units  221-250= 4 units  251-280= 5 units  281-310= 6 units  311-340= 7 units  341-370= 8 units  371-400= 9 units  401-430= 10 units  431-480= 11 units  481-490= 12 units  491-520= 13  "units  521-550= 14 units  551-580= 15 units  581-610= 16 units  611-800= 17 units  Refills:  0     insulin detemir 100 UNIT/ML pen  Commonly known as:  LEVEMIR PEN      Dose:  27 Units  Inject 27 Units Subcutaneous 2 times daily  Refills:  0     ipratropium - albuterol 0.5 mg/2.5 mg/3 mL 0.5-2.5 (3) MG/3ML neb solution  Commonly known as:  DUONEB      Dose:  1 vial  Take 1 vial by nebulization every 4 hours as needed for shortness of breath / dyspnea or wheezing  Refills:  0     * levothyroxine 25 MCG tablet  Commonly known as:  SYNTHROID/LEVOTHROID      Dose:  0.5 tablet  Take 0.5 tablets by mouth daily Take with 200 microgram tablet.  Refills:  0     * levothyroxine 200 MCG tablet  Commonly known as:  SYNTHROID/LEVOTHROID      Dose:  1 tablet  Take 1 tablet by mouth daily Take on empty stomach with plenty of water. Take with 25 microgram tablet.  Refills:  0     Lidocaine 4 % Patch  Commonly known as:  LIDOCARE      Dose:  1 patch  Place 1 patch onto the skin every 24 hours To prevent lidocaine toxicity, patient should be patch free for 12 hrs daily.    On in AM  Off at HS  Refills:  0     lidocaine-prilocaine 2.5-2.5 % external cream  Commonly known as:  EMLA      Apply topically Every Mon, Wed, Fri Morning Before dialysis  Refills:  0     magnesium hydroxide 400 MG/5ML suspension  Commonly known as:  MILK OF MAGNESIA      Dose:  30 mL  Take 30 mLs by mouth daily as needed for constipation (If no bowel movement in 3 days)  Refills:  0     metoprolol tartrate 25 MG tablet  Commonly known as:  LOPRESSOR      Dose:  25 mg  Take 25 mg by mouth 2 times daily Hold for pulse <55  Hold for SBP <100  Refills:  0     montelukast 10 MG tablet  Commonly known as:  SINGULAIR      Dose:  10 mg  Take 10 mg by mouth At Bedtime  Refills:  0     Needle (Disp) 31G X 5/16\" Misc      Pen needle  Refills:  0     nitroGLYcerin 0.4 MG sublingual tablet  Commonly known as:  NITROSTAT      Dose:  0.4 mg  Place 0.4 mg under the tongue " Place 0.4 mg under the tongue every 5 minutes if needed. Patient not using at this time.   Indications: ANGINA  Refills:  0     nystatin 460693 UNIT/GM external powder  Commonly known as:  MYCOSTATIN      Apply topically 2 times daily as needed  Refills:  0     ondansetron 4 MG tablet  Commonly known as:  ZOFRAN      Dose:  4 mg  Take 4 mg by mouth every 8 hours as needed for nausea  Refills:  0     oxyCODONE 5 MG tablet  Commonly known as:  ROXICODONE      Dose:  5 mg  Take 5 mg by mouth every 6 hours as needed for pain Take one tablet by mouth every 6 hours as needed for pain  Refills:  0     senna-docusate 8.6-50 MG tablet  Commonly known as:  SENOKOT-S/PERICOLACE      Dose:  1 tablet  Take 1 tablet by mouth daily  Refills:  0     sevelamer carbonate 800 MG tablet  Commonly known as:  RENVELA      Dose:  2,400 mg  Take 2,400 mg by mouth 3 times daily (with meals)  Refills:  0     traMADol 50 MG tablet  Commonly known as:  ULTRAM      Dose:  50 mg  Take 50 mg by mouth every 12 hours as needed for severe pain  Refills:  0         * This list has 4 medication(s) that are the same as other medications prescribed for you. Read the directions carefully, and ask your doctor or other care provider to review them with you.               Where to get your medicines      These medications were sent to [id number] - [place], [place] - [address]  [address] [facility] [address]    Phone:  [phone]     doxycycline hyclate 100 MG capsule         We also reviewed Lucila Willett's allergy list and updated it as needed:  Allergies: Cetirizine; Pregabalin; Latex; Rosiglitazone; and Zosyn [piperacillin-tazobactam in d5w]    Thank you for continuing to care for Lucila Willett.  We look forward to working together with you in the future.    Sincerely,  Trudi Teresa [facility]    Removed hydrocodone/apap per [facility] and [facility]  Trudi Teresa [facility]  on 8/22/2020 at 4:37 PM  Changed ondansetron to odt per Goldie (not listed on MAR)  Trudi Teresa [facility] on 8/22/2020 at 4:40 PM

## 2020-08-22 NOTE — PHARMACY-ADMISSION MEDICATION HISTORY
Pharmacy -- Admission Medication Reconciliation    Prior to admission (PTA) medications were reviewed and the patient's PTA medication list was updated.    Sources Consulted: Brittany Hoyt, [place] [facility], Goldie Hernadez [phone] on telephone x2    The reliability of this Medication Reconciliation is: Reliability: Reliable    The following significant changes were made:  Added last doses per MAR  Changed allopurinol to 100 mg  Added tramadol  Removed calcitriol  Removed calcium carbonate  Removed ranitidine  Removed gabapentin 300 mg  Added gabapentin 100 mg  Changed hydrocodone / apap to 1 tablet every 6 hours prn  Added acetaminophen  Added levemir  Changed novolog to 19 units with meals  Added novolog sliding scale per MAR  Removed lantus  Removed loratadine  Added hold parameters to metoprolol tartrate  Added mom  Added robitussin  Added duonebs  Added lidocaine patch  Changed directions for oxycodone  Removed torsemide  Added nystatin powder  Added senna s  Added fexofenadine  Added renvela  Added mylanta  Added dialytab  Added ondansetron  Added emla cream  Added genteal eye drops    In addition, the patient's allergies were reviewed with the patient's MAR and left as follows:   Allergies: Cetirizine; Pregabalin; Latex; Rosiglitazone; and Zosyn [piperacillin-tazobactam in d5w]    The pharmacist has reviewed with the patient that all personal medications should be removed from the building or locked in the belongings safe.  Patient shall only take medications ordered by the physician and administered by the nursing staff.       Medication barriers identified: multiple medications, multiple changes, dialysis   Medication adherence concerns: [facility] administers   Understanding of emergency medications: [facility] administers    Trudi Teresa [facility], 8/22/2020,  10:58 AM

## 2020-08-22 NOTE — PLAN OF CARE
"Pt not complaining, Lt arm bandage not leaking through tap but can see pink soaked gauze under it. Unchanged since Dr. Quintero opens and packed in [place]. Has been sleeping most of shift. Monitor comfort, safety and s & s of infection.  /45   Pulse 80   Temp 98  F (36.7  C) (Tympanic)   Resp 16   Ht 1.626 m (5' 4\")   Wt 127 kg (280 lb)   SpO2 97%   BMI 48.06 kg/m      "

## 2020-08-22 NOTE — ED PROVIDER NOTES
History     Chief Complaint   Patient presents with     Arm Pain     HPI  Lucila Willett is a 65 year old female who presents to the ED for evaluation of arm pain.  She has a hx of DM2, CKD on dialysis, recent AV fistula revision to left arm. Over the last few days she began noticing increased redness, hardness to area of her revision.  She had a full dialysis session today.  She feels like she may have had an increased fever earlier today.  She denies any chest pain, shortness of breath.  She does also report very small skin lesion on the left side of her left breast that just began recently.    Allergies:  Allergies   Allergen Reactions     Cetirizine      Other reaction(s): *Unknown - Pt Doesn't Remember     Pregabalin      Other reaction(s): Other - Describe In Comment Field  Floaters in eyes.      Latex Rash     Patient states she gets a rash     Rosiglitazone Rash     Patient states she gets a rash     Zosyn [Piperacillin-Tazobactam In D5w] Rash       Problem List:    Patient Active Problem List    Diagnosis Date Noted     Cellulitis 08/21/2020     Priority: Medium     Leukocytosis 12/19/2016     Priority: Medium     Hyponatremia 06/10/2016     Priority: Medium     Cellulitis of left lower extremity 06/09/2016     Priority: Medium     Severe sepsis (H) 06/09/2016     Priority: Medium     Foot ulcer (H) 01/11/2016     Priority: Medium     Diastolic heart failure (H) 07/01/2015     Priority: Medium     Chronic pain disorder 06/30/2015     Priority: Medium     Overview:   Overview:   [facility] Agreement for Opioid Treatment  PHYSICIAN:  Gauri Grimm MD  PHARMACY:  [facility] in [place], [place]  PHARMACY PHONE:  [phone]    Last two urine drug screens have been negative - on 1/7/13 it had been two days since she took lortab.  Negative for norco aug 2015       Type 2 diabetes mellitus (H) 06/11/2013     Priority: Medium     Overview:   Overview:   LDL 94 - on crestor  Off valsartan because of worsening  renal function ? Hypotension (10/2014: will retry low dose lisinopril)  On ASA  Right diabetic retinopathy on right 2/2014       Hypokalemia 09/13/2012     Priority: Medium     Anemia 09/12/2012     Priority: Medium     Cellulitis and abscess of leg, except foot 09/12/2012     Priority: Medium     Diabetes mellitus, type II (H) 09/12/2012     Priority: Medium     Overview:   a system change updated this record. This will not affect patient care or billing. This comment can be deleted.       Hypertensive heart failure with end stage renal disease (H) 09/12/2012     Priority: Medium     Morbid obesity (H) 09/12/2012     Priority: Medium     Chronic kidney disease, stage IV (severe) (H) 03/23/2012     Priority: Medium     Overview:   Overview:   IMO Update 10/11       Atherosclerosis of coronary artery 03/17/2010     Priority: Medium     Overview:   Overview:   IMO Update       Gout 02/29/2008     Priority: Medium     Essential hypertension 02/27/2008     Priority: Medium     Hypothyroidism 02/27/2008     Priority: Medium     Overview:   Overview:   IMO Update 10/11       Sleep apnea 02/27/2008     Priority: Medium     Overview:   Overview:   10/2012: cpap did not work - trying BIPAP          Past Medical History:    Past Medical History:   Diagnosis Date     Atherosclerotic heart disease of native coronary artery without angina pectoris      Cellulitis      Chronic kidney disease      Diastolic congestive heart failure (H)      Hypothyroidism      Major depressive disorder, single episode      Obesity      Personal history of other medical treatment (CODE)      Sleep apnea      Type 2 diabetes mellitus without complications (H)        Past Surgical History:    Past Surgical History:   Procedure Laterality Date     CHOLECYSTECTOMY      No Comments Provided     OTHER SURGICAL HISTORY      340015,OTHER     OTHER SURGICAL HISTORY      09516.0,AR SPINE FUSION ANTER 3 SGMTS     OTHER SURGICAL HISTORY      LOC-1006.04,CABG  "    OTHER SURGICAL HISTORY      033827,ENDOSCOPY GI       Family History:    No family history on file.    Social History:  Marital Status:   [4]  Social History     Tobacco Use     Smoking status: Former Smoker     Packs/day: 2.00     Years: 20.00     Pack years: 40.00     Types: Cigarettes     Last attempt to quit: 1997     Years since quittin.9     Smokeless tobacco: Never Used   Substance Use Topics     Alcohol use: Yes     Alcohol/week: 0.0 standard drinks     Comment: Alcoholic Drinks/day: Occasionally     Drug use: Unknown     Types: Other     Comment: Drug use: No        Medications:    No current outpatient medications on file.        Review of Systems   Constitutional: Negative for chills and fever.   HENT: Negative for congestion.    Eyes: Negative for visual disturbance.   Respiratory: Negative for chest tightness and shortness of breath.    Cardiovascular: Negative for chest pain.   Gastrointestinal: Negative for abdominal pain.   Genitourinary: Negative for hematuria.   Musculoskeletal: Negative for back pain.        Left inner arm pain, redness, swelling   Skin: Positive for color change. Negative for rash and wound.   Neurological: Negative for syncope.   Hematological: Negative for adenopathy. Does not bruise/bleed easily.   Psychiatric/Behavioral: Negative for confusion.       Physical Exam   BP: 115/44  Pulse: 69  Temp: 98.2  F (36.8  C)  Resp: 16  Height: 162.6 cm (5' 4\")  Weight: 127 kg (280 lb)  SpO2: 97 %      Physical Exam  Constitutional:       General: She is not in acute distress.     Appearance: She is well-developed. She is not diaphoretic.   HENT:      Head: Normocephalic and atraumatic.   Eyes:      General: No scleral icterus.     Conjunctiva/sclera: Conjunctivae normal.   Neck:      Musculoskeletal: Neck supple.   Cardiovascular:      Rate and Rhythm: Normal rate and regular rhythm.   Pulmonary:      Effort: Pulmonary effort is normal.      Breath sounds: Normal " breath sounds.   Abdominal:      Palpations: Abdomen is soft.      Tenderness: There is no abdominal tenderness.   Musculoskeletal:         General: No deformity.   Lymphadenopathy:      Cervical: No cervical adenopathy.   Skin:     General: Skin is warm and dry.      Findings: No rash.      Comments: Fluctuant/red, indurated area on inside of left upper arm, ~ 5cm diameter.   Neurological:      Mental Status: She is alert and oriented to person, place, and time. Mental status is at baseline.   Psychiatric:         Mood and Affect: Mood normal.         Behavior: Behavior normal.         ED Course        Procedures               Critical Care time:  none               Results for orders placed or performed during the hospital encounter of 08/21/20 (from the past 24 hour(s))   Comprehensive metabolic panel   Result Value Ref Range    Sodium 135 134 - 144 mmol/L    Potassium 4.1 3.5 - 5.1 mmol/L    Chloride 89 (L) 98 - 107 mmol/L    Carbon Dioxide 36 (H) 21 - 31 mmol/L    Anion Gap 10 3 - 14 mmol/L    Glucose 160 (H) 70 - 105 mg/dL    Urea Nitrogen 12 7 - 25 mg/dL    Creatinine 2.64 (H) 0.60 - 1.20 mg/dL    GFR Estimate 18 (L) >60 mL/min/[1.73_m2]    GFR Estimate If Black 22 (L) >60 mL/min/[1.73_m2]    Calcium 9.5 8.6 - 10.3 mg/dL    Bilirubin Total 0.7 0.3 - 1.0 mg/dL    Albumin 4.4 3.5 - 5.7 g/dL    Protein Total 7.9 6.4 - 8.9 g/dL    Alkaline Phosphatase 163 (H) 34 - 104 U/L    ALT 43 7 - 52 U/L    AST 27 13 - 39 U/L   CBC with platelets differential   Result Value Ref Range    WBC 12.7 (H) 4.0 - 11.0 10e9/L    RBC Count 3.48 (L) 3.8 - 5.2 10e12/L    Hemoglobin 11.9 11.7 - 15.7 g/dL    Hematocrit 36.6 35.0 - 47.0 %     (H) 78 - 100 fl    MCH 34.2 (H) 26.5 - 33.0 pg    MCHC 32.5 31.5 - 36.5 g/dL    RDW 15.9 (H) 10.0 - 15.0 %    Platelet Count 226 150 - 450 10e9/L    Diff Method Automated Method     % Neutrophils 68.3 %    % Lymphocytes 11.8 %    % Monocytes 16.9 %    % Eosinophils 1.7 %    % Basophils 0.4 %     % Immature Granulocytes 0.9 %    Absolute Neutrophil 8.7 (H) 1.6 - 8.3 10e9/L    Absolute Lymphocytes 1.5 0.8 - 5.3 10e9/L    Absolute Monocytes 2.2 (H) 0.0 - 1.3 10e9/L    Absolute Eosinophils 0.2 0.0 - 0.7 10e9/L    Absolute Basophils 0.1 0.0 - 0.2 10e9/L    Abs Immature Granulocytes 0.1 0 - 0.4 10e9/L   US Upper Extremity Venous Duplex Left    Narrative    PROCEDURE: US UPPER EXTREMITY VENOUS DUPLEX LEFT 8/21/2020 5:47 PM    HISTORY: increased redness, warmth purulent drainage at AV fisutla  revision site. Abscess? DVT?    COMPARISONS: None.    TECHNIQUE: Ultrasound of the veins of the left upper extremity    FINDINGS: There is a large low echogenicity collection in the  antecubital fossa region on the left. In light of the history of pain  and redness this may represent an abscess. The brachial axillary and  subclavian veins are patent.         Impression    IMPRESSION: Low echogenicity mass in the antecubital fossa suspicious  for an abscess.    JOSÉ MIGUEL MARIE MD       Medications   vancomycin (VANCOCIN) 2,000 mg in sodium chloride 0.9 % 500 mL intermittent infusion (2,000 mg Intravenous Not Given 8/21/20 2333)   allopurinol (ZYLOPRIM) tablet 200 mg (has no administration in time range)   aspirin (ASA) tablet 325 mg (has no administration in time range)   atorvastatin (LIPITOR) tablet 80 mg (80 mg Oral Given 8/21/20 2157)   buPROPion (WELLBUTRIN XL) 24 hr tablet 450 mg (has no administration in time range)   Vitamin D3 (CHOLECALCIFEROL) tablet 5,000 Units (has no administration in time range)   FLUoxetine (PROzac) capsule 40 mg (has no administration in time range)   gabapentin (NEURONTIN) capsule 300 mg (300 mg Oral Given 8/21/20 2157)   insulin aspart (NovoLOG) injection (RAPID ACTING) (has no administration in time range)   metoprolol tartrate (LOPRESSOR) tablet 25 mg (25 mg Oral Given 8/21/20 2157)   montelukast (SINGULAIR) chewable tablet 10 mg (10 mg Oral Given 8/21/20 2156)   famotidine (PEPCID) tablet  20 mg (has no administration in time range)   torsemide (DEMADEX) tablet 80 mg (has no administration in time range)   lidocaine 1 % 0.1-1 mL (has no administration in time range)   lidocaine (LMX4) cream (has no administration in time range)   sodium chloride (PF) 0.9% PF flush 3 mL (has no administration in time range)   sodium chloride (PF) 0.9% PF flush 3 mL (3 mLs Intracatheter Given 8/21/20 2148)   naloxone (NARCAN) injection 0.1-0.4 mg (has no administration in time range)   glucose gel 15-30 g (has no administration in time range)     Or   dextrose 50 % injection 25-50 mL (has no administration in time range)     Or   glucagon injection 1 mg (has no administration in time range)   levothyroxine (SYNTHROID/LEVOTHROID) tablet 225 mcg (has no administration in time range)   insulin glargine (LANTUS PEN) injection 27 Units (27 Units Subcutaneous Given 8/21/20 2313)   cephALEXin (KEFLEX) capsule 500 mg (500 mg Oral Given 8/21/20 1758)   vancomycin (VANCOCIN) 2,000 mg in sodium chloride 0.9 % 500 mL intermittent infusion (0 mg Intravenous ED Infusing on Admission/transfer 8/21/20 1930)       Assessments & Plan (with Medical Decision Making)   Patient is nontoxic in no acute distress.  She does have a very erythematous, indurated area on the inside of her left upper arm.  No active drainage.  I got basic lab work which showed a WBC of 12.7.  She is afebrile.  Ultrasound read as Low echogenicity mass in the antecubital fossa suspicious for an abscess.    I did discuss with Dr. Quintero, general surgeon who says that he feels comfortable taking care of this patient at [facility].  She is accepted for admission by Dr. Younger.  She is given IV vancomycin.    Samuel Diop PA-C        I have reviewed the nursing notes.    I have reviewed the findings, diagnosis, plan and need for follow up with the patient.       Current Discharge Medication List          Final diagnoses:   Abscess of left arm       8/21/2020    [facility] AND [facility]     Samuel Diop PA  08/21/20 1773

## 2020-08-23 ENCOUNTER — APPOINTMENT (OUTPATIENT)
Dept: LAB | Facility: OTHER | Age: 65
End: 2020-08-23
Attending: FAMILY MEDICINE
Payer: MEDICARE

## 2020-08-23 ENCOUNTER — MEDICAL CORRESPONDENCE (OUTPATIENT)
Dept: HEALTH INFORMATION MANAGEMENT | Facility: OTHER | Age: 65
End: 2020-08-23

## 2020-08-23 ENCOUNTER — RESULTS ONLY (OUTPATIENT)
Dept: LAB | Age: 65
End: 2020-08-23

## 2020-08-23 LAB
BACTERIA SPEC CULT: ABNORMAL
SPECIMEN SOURCE: ABNORMAL

## 2020-08-23 NOTE — PROGRESS NOTES
I and D site improved. Packing changed.  Follow-up with vascular surge.      Alirio Quintero MD on 8/23/2020 at 2:40 PM

## 2020-08-24 ENCOUNTER — PATIENT OUTREACH (OUTPATIENT)
Dept: CARE COORDINATION | Facility: CLINIC | Age: 65
End: 2020-08-24

## 2020-08-24 NOTE — PROGRESS NOTES
Clinic Care Coordination Contact  [facility]/Voicemail       Clinical Data: Care Coordinator Outreach  Outreach attempted x 1.  Left message on patient's voicemail with call back information and requested return call.  Plan: Care Coordinator will attempt again.    Patient is noted to be following up with [facility] for primary care. Visit here cancelled. No more attempts will be made.  Triny Chou RN on 8/24/2020 at 12:31 PM

## 2020-08-25 ENCOUNTER — TELEPHONE (OUTPATIENT)
Dept: INTERNAL MEDICINE | Facility: OTHER | Age: 65
End: 2020-08-25

## 2020-08-25 LAB
SARS-COV-2 RNA SPEC QL NAA+PROBE: NOT DETECTED
SPECIMEN SOURCE: NORMAL

## 2020-08-25 NOTE — TELEPHONE ENCOUNTER
Calling [facility]. Patients hospital follow up should be with her primary clinic in [facility]. Spoke with [facility]. The facility will reschedule with the correct clinic. Sanjana Bahena LPN .............8/25/2020  9:21 AM

## 2020-09-17 ENCOUNTER — RESULTS ONLY (OUTPATIENT)
Dept: LAB | Age: 65
End: 2020-09-17

## 2020-09-17 ENCOUNTER — MEDICAL CORRESPONDENCE (OUTPATIENT)
Dept: HEALTH INFORMATION MANAGEMENT | Facility: OTHER | Age: 65
End: 2020-09-17

## 2020-09-17 ENCOUNTER — APPOINTMENT (OUTPATIENT)
Dept: LAB | Facility: OTHER | Age: 65
End: 2020-09-17
Attending: NURSE PRACTITIONER
Payer: MEDICARE

## 2020-09-17 LAB
HBA1C MFR BLD: 5.7 % (ref 4–6)
TSH SERPL DL<=0.05 MIU/L-ACNC: 0.79 IU/ML (ref 0.34–5.6)
URATE SERPL-MCNC: 6.1 MG/DL (ref 4.4–7.6)

## 2020-10-22 ENCOUNTER — RESULTS ONLY (OUTPATIENT)
Dept: LAB | Age: 65
End: 2020-10-22

## 2020-10-22 ENCOUNTER — MEDICAL CORRESPONDENCE (OUTPATIENT)
Dept: HEALTH INFORMATION MANAGEMENT | Facility: OTHER | Age: 65
End: 2020-10-22

## 2020-10-22 LAB
CHOLEST SERPL-MCNC: 136 MG/DL
HDLC SERPL-MCNC: 48 MG/DL (ref 23–92)
LDLC SERPL CALC-MCNC: 40 MG/DL
NONHDLC SERPL-MCNC: 88 MG/DL
TRIGL SERPL-MCNC: 242 MG/DL

## 2021-07-24 ENCOUNTER — HOSPITAL ENCOUNTER (EMERGENCY)
Facility: OTHER | Age: 66
Discharge: NURSING, REHAB OR LONG-TERM CARE | End: 2021-07-24
Attending: EMERGENCY MEDICINE | Admitting: EMERGENCY MEDICINE
Payer: MEDICARE

## 2021-07-24 VITALS
HEIGHT: 64 IN | BODY MASS INDEX: 47.46 KG/M2 | TEMPERATURE: 98.7 F | HEART RATE: 95 BPM | RESPIRATION RATE: 16 BRPM | SYSTOLIC BLOOD PRESSURE: 133 MMHG | WEIGHT: 278 LBS | OXYGEN SATURATION: 92 % | DIASTOLIC BLOOD PRESSURE: 84 MMHG

## 2021-07-24 DIAGNOSIS — K62.5 BRBPR (BRIGHT RED BLOOD PER RECTUM): ICD-10-CM

## 2021-07-24 LAB
ANION GAP SERPL CALCULATED.3IONS-SCNC: 12 MMOL/L (ref 3–14)
BASOPHILS # BLD AUTO: 0.1 10E3/UL (ref 0–0.2)
BASOPHILS NFR BLD AUTO: 1 %
BUN SERPL-MCNC: 30 MG/DL (ref 7–25)
CALCIUM SERPL-MCNC: 9.3 MG/DL (ref 8.6–10.3)
CHLORIDE BLD-SCNC: 89 MMOL/L (ref 98–107)
CO2 SERPL-SCNC: 30 MMOL/L (ref 21–31)
CREAT SERPL-MCNC: 5.34 MG/DL (ref 0.6–1.2)
EOSINOPHIL # BLD AUTO: 0.3 10E3/UL (ref 0–0.7)
EOSINOPHIL NFR BLD AUTO: 2 %
ERYTHROCYTE [DISTWIDTH] IN BLOOD BY AUTOMATED COUNT: 16.9 % (ref 10–15)
GFR SERPL CREATININE-BSD FRML MDRD: 8 ML/MIN/1.73M2
GLUCOSE BLD-MCNC: 210 MG/DL (ref 70–105)
HCT VFR BLD AUTO: 32.2 % (ref 35–47)
HEMOCCULT STL QL: POSITIVE
HGB BLD-MCNC: 10.7 G/DL (ref 11.7–15.7)
IMM GRANULOCYTES # BLD: 0.5 10E3/UL
IMM GRANULOCYTES NFR BLD: 4 %
LYMPHOCYTES # BLD AUTO: 2.2 10E3/UL (ref 0.8–5.3)
LYMPHOCYTES NFR BLD AUTO: 17 %
MCH RBC QN AUTO: 35.4 PG (ref 26.5–33)
MCHC RBC AUTO-ENTMCNC: 33.2 G/DL (ref 31.5–36.5)
MCV RBC AUTO: 107 FL (ref 78–100)
MONOCYTES # BLD AUTO: 1.9 10E3/UL (ref 0–1.3)
MONOCYTES NFR BLD AUTO: 15 %
NEUTROPHILS # BLD AUTO: 7.8 10E3/UL (ref 1.6–8.3)
NEUTROPHILS NFR BLD AUTO: 61 %
NRBC # BLD AUTO: 0 10E3/UL
NRBC BLD AUTO-RTO: 0 /100
PLATELET # BLD AUTO: 271 10E3/UL (ref 150–450)
POTASSIUM BLD-SCNC: 5.1 MMOL/L (ref 3.5–5.1)
RBC # BLD AUTO: 3.02 10E6/UL (ref 3.8–5.2)
SODIUM SERPL-SCNC: 131 MMOL/L (ref 134–144)
WBC # BLD AUTO: 12.8 10E3/UL (ref 4–11)

## 2021-07-24 PROCEDURE — 36415 COLL VENOUS BLD VENIPUNCTURE: CPT | Performed by: EMERGENCY MEDICINE

## 2021-07-24 PROCEDURE — 99283 EMERGENCY DEPT VISIT LOW MDM: CPT | Performed by: EMERGENCY MEDICINE

## 2021-07-24 PROCEDURE — 80048 BASIC METABOLIC PNL TOTAL CA: CPT | Performed by: EMERGENCY MEDICINE

## 2021-07-24 PROCEDURE — 82272 OCCULT BLD FECES 1-3 TESTS: CPT | Performed by: EMERGENCY MEDICINE

## 2021-07-24 PROCEDURE — 85004 AUTOMATED DIFF WBC COUNT: CPT | Performed by: EMERGENCY MEDICINE

## 2021-07-24 ASSESSMENT — ENCOUNTER SYMPTOMS
AGITATION: 0
BLOOD IN STOOL: 1
CHILLS: 0
VOMITING: 0
ABDOMINAL PAIN: 0
DYSURIA: 0
NAUSEA: 0
SHORTNESS OF BREATH: 0
CHEST TIGHTNESS: 0
FEVER: 0
ARTHRALGIAS: 0

## 2021-07-24 ASSESSMENT — MIFFLIN-ST. JEOR: SCORE: 1786

## 2021-07-24 NOTE — ED PROVIDER NOTES
History     Chief Complaint   Patient presents with     Rectal Bleeding     HPI  Lucila Willett is a 65 year old female who is here reporting blood in her stool.  Began last night.  Initially she had brown stool with bright red blood mixed in.  She states she has recently been constipated but took some milk of magnesia but she was having to push very hard but has gotten things going again.  Since last night she has had 3 bowel movements each one of them with blood.  This morning there was a lot of blood.  She states she could not see to the bottom of the toilet bowl.  She is not feeling weak dizzy lightheaded.  No abdominal pain.  Does have a history of hemorrhoids but has never seen as much blood before.    Allergies:  Allergies   Allergen Reactions     Cetirizine      Other reaction(s): *Unknown - Pt Doesn't Remember     Pregabalin      Other reaction(s): Other - Describe In Comment Field  Floaters in eyes.      Latex Rash     Patient states she gets a rash     Rosiglitazone Rash     Patient states she gets a rash     Zosyn [Piperacillin-Tazobactam In D5w] Rash       Problem List:    Patient Active Problem List    Diagnosis Date Noted     Cellulitis 08/21/2020     Priority: Medium     Leukocytosis 12/19/2016     Priority: Medium     Hyponatremia 06/10/2016     Priority: Medium     Cellulitis of left lower extremity 06/09/2016     Priority: Medium     Severe sepsis (H) 06/09/2016     Priority: Medium     Foot ulcer (H) 01/11/2016     Priority: Medium     Diastolic heart failure (H) 07/01/2015     Priority: Medium     Chronic pain disorder 06/30/2015     Priority: Medium     Overview:   Overview:   [facility] Agreement for Opioid Treatment  PHYSICIAN:  Gauri Grimm MD  PHARMACY:  [facility] in [place], [place]  PHARMACY PHONE:  [phone]    Last two urine drug screens have been negative - on 1/7/13 it had been two days since she took lortab.  Negative for norco aug 2015       Type 2 diabetes mellitus (H)  06/11/2013     Priority: Medium     Overview:   Overview:   LDL 94 - on crestor  Off valsartan because of worsening renal function ? Hypotension (10/2014: will retry low dose lisinopril)  On ASA  Right diabetic retinopathy on right 2/2014       Hypokalemia 09/13/2012     Priority: Medium     Anemia 09/12/2012     Priority: Medium     Cellulitis and abscess of leg, except foot 09/12/2012     Priority: Medium     Diabetes mellitus, type II (H) 09/12/2012     Priority: Medium     Overview:   a system change updated this record. This will not affect patient care or billing. This comment can be deleted.       Hypertensive heart failure with end stage renal disease (H) 09/12/2012     Priority: Medium     Morbid obesity (H) 09/12/2012     Priority: Medium     Chronic kidney disease, stage IV (severe) (H) 03/23/2012     Priority: Medium     Overview:   Overview:   IMO Update 10/11       Atherosclerosis of coronary artery 03/17/2010     Priority: Medium     Overview:   Overview:   IMO Update       Gout 02/29/2008     Priority: Medium     Essential hypertension 02/27/2008     Priority: Medium     Hypothyroidism 02/27/2008     Priority: Medium     Overview:   Overview:   IMO Update 10/11       Sleep apnea 02/27/2008     Priority: Medium     Overview:   Overview:   10/2012: cpap did not work - trying BIPAP          Past Medical History:    Past Medical History:   Diagnosis Date     Atherosclerotic heart disease of native coronary artery without angina pectoris      Cellulitis      Chronic kidney disease      Diastolic congestive heart failure (H)      Hypothyroidism      Major depressive disorder, single episode      Obesity      Personal history of other medical treatment (CODE)      Sleep apnea      Type 2 diabetes mellitus without complications (H)        Past Surgical History:    Past Surgical History:   Procedure Laterality Date     CHOLECYSTECTOMY      No Comments Provided     OTHER SURGICAL HISTORY      057496,OTHER      OTHER SURGICAL HISTORY      51756.0,[place] SPINE FUSION ANTER 3 SGMTS     OTHER SURGICAL HISTORY      LOC-1006.04,CABG     OTHER SURGICAL HISTORY      427247,ENDOSCOPY GI       Family History:    History reviewed. No pertinent family history.    Social History:  Marital Status:   [4]  Social History     Tobacco Use     Smoking status: Former Smoker     Packs/day: 2.00     Years: 20.00     Pack years: 40.00     Types: Cigarettes     Quit date: 1997     Years since quittin.8     Smokeless tobacco: Never Used   Substance Use Topics     Alcohol use: Yes     Alcohol/week: 0.0 standard drinks     Comment: Alcoholic Drinks/day: Occasionally     Drug use: Unknown     Types: Other     Comment: Drug use: No        Medications:    acetaminophen (TYLENOL) 500 MG tablet  allopurinol (ZYLOPRIM) 100 MG tablet  alum & mag hydroxide-simethicone (MAALOX) 200-200-20 MG/5ML SUSP suspension  aspirin (GOODSENSE ASPIRIN) 325 MG tablet  atorvastatin (LIPITOR) 80 MG tablet  B Complex-C-Folic Acid (DIALYVITE 800 PO)  blood glucose (NO BRAND SPECIFIED) lancets standard  blood glucose monitoring (NO BRAND SPECIFIED) test strip  buPROPion (WELLBUTRIN XL) 150 MG 24 hr tablet  Cholecalciferol (D 5000) 5000 UNITS TABS  clotrimazole (LOTRIMIN) 1 % cream  fexofenadine (ALLEGRA) 60 MG tablet  FLUoxetine (PROZAC) 20 MG capsule  fluticasone (FLONASE) 50 MCG/ACT spray  gabapentin (NEURONTIN) 100 MG capsule  guaiFENesin (ROBITUSSIN) 20 mg/mL SOLN solution  hypromellose (GENTEAL) 0.3 % SOLN ophthalmic solution  insulin aspart (NOVOLOG PEN) 100 UNIT/ML injection  insulin aspart (NOVOLOG PEN) 100 UNIT/ML pen  insulin detemir (LEVEMIR PEN) 100 UNIT/ML pen  ipratropium - albuterol 0.5 mg/2.5 mg/3 mL (DUONEB) 0.5-2.5 (3) MG/3ML neb solution  levothyroxine (SYNTHROID/LEVOTHROID) 200 MCG tablet  levothyroxine (SYNTHROID/LEVOTHROID) 25 MCG tablet  Lidocaine (LIDOCARE) 4 % Patch  lidocaine-prilocaine (EMLA) 2.5-2.5 % external cream  magnesium  "hydroxide (MILK OF MAGNESIA) 400 MG/5ML suspension  metoprolol tartrate (LOPRESSOR) 25 MG tablet  montelukast (SINGULAIR) 10 MG tablet  Needle, Disp, 31G X 5/16\" MISC  nitroGLYcerin (NITROSTAT) 0.4 MG sublingual tablet  nystatin (MYCOSTATIN) 199270 UNIT/GM external powder  ondansetron (ZOFRAN-ODT) 4 MG ODT tab  oxyCODONE IR (ROXICODONE) 5 MG tablet  senna-docusate (SENOKOT-S/PERICOLACE) 8.6-50 MG tablet  sevelamer carbonate (RENVELA) 800 MG tablet  traMADol (ULTRAM) 50 MG tablet          Review of Systems   Constitutional: Negative for chills and fever.   HENT: Negative for congestion.    Eyes: Negative for visual disturbance.   Respiratory: Negative for chest tightness and shortness of breath.    Cardiovascular: Negative for chest pain.   Gastrointestinal: Positive for blood in stool. Negative for abdominal pain, nausea and vomiting.   Genitourinary: Negative for dysuria.   Musculoskeletal: Negative for arthralgias.   Skin: Negative for rash.   Psychiatric/Behavioral: Negative for agitation.       Physical Exam   BP: 136/82  Pulse: 92  Temp: 98.2  F (36.8  C)  Resp: 18  Height: 162.6 cm (5' 4\")  Weight: 126.1 kg (278 lb)  SpO2: 98 %      Physical Exam  Vitals and nursing note reviewed.   Constitutional:       Appearance: Normal appearance.   HENT:      Head: Normocephalic and atraumatic.      Mouth/Throat:      Mouth: Mucous membranes are moist.   Eyes:      Conjunctiva/sclera: Conjunctivae normal.   Cardiovascular:      Rate and Rhythm: Normal rate and regular rhythm.      Heart sounds: Normal heart sounds.   Pulmonary:      Effort: Pulmonary effort is normal.      Breath sounds: Normal breath sounds.   Abdominal:      General: Bowel sounds are normal.   Skin:     General: Skin is warm and dry.   Neurological:      Mental Status: She is alert and oriented to person, place, and time.   Psychiatric:         Behavior: Behavior normal.         ED Course        Procedures                Results for orders placed or " performed during the hospital encounter of 07/24/21 (from the past 24 hour(s))   CBC with platelets differential    Narrative    The following orders were created for panel order CBC with platelets differential.  Procedure                               Abnormality         Status                     ---------                               -----------         ------                     CBC with platelets and d...[[id number]]  Abnormal            Final result                 Please view results for these tests on the individual orders.   Basic metabolic panel   Result Value Ref Range    Sodium 131 (L) 134 - 144 mmol/L    Potassium 5.1 3.5 - 5.1 mmol/L    Chloride 89 (L) 98 - 107 mmol/L    Carbon Dioxide (CO2) 30 21 - 31 mmol/L    Anion Gap 12 3 - 14 mmol/L    Urea Nitrogen 30 (H) 7 - 25 mg/dL    Creatinine 5.34 (H) 0.60 - 1.20 mg/dL    Calcium 9.3 8.6 - 10.3 mg/dL    Glucose 210 (H) 70 - 105 mg/dL    GFR Estimate 8 (L) >60 mL/min/1.73m2   CBC with platelets and differential   Result Value Ref Range    WBC Count 12.8 (H) 4.0 - 11.0 10e3/uL    RBC Count 3.02 (L) 3.80 - 5.20 10e6/uL    Hemoglobin 10.7 (L) 11.7 - 15.7 g/dL    Hematocrit 32.2 (L) 35.0 - 47.0 %     (H) 78 - 100 fL    MCH 35.4 (H) 26.5 - 33.0 pg    MCHC 33.2 31.5 - 36.5 g/dL    RDW 16.9 (H) 10.0 - 15.0 %    Platelet Count 271 150 - 450 10e3/uL    % Neutrophils 61 %    % Lymphocytes 17 %    % Monocytes 15 %    % Eosinophils 2 %    % Basophils 1 %    % Immature Granulocytes 4 %    NRBCs per 100 WBC 0 <1 /100    Absolute Neutrophils 7.8 1.6 - 8.3 10e3/uL    Absolute Lymphocytes 2.2 0.8 - 5.3 10e3/uL    Absolute Monocytes 1.9 (H) 0.0 - 1.3 10e3/uL    Absolute Eosinophils 0.3 0.0 - 0.7 10e3/uL    Absolute Basophils 0.1 0.0 - 0.2 10e3/uL    Absolute Immature Granulocytes 0.5 (H) <=0.0 10e3/uL    Absolute NRBCs 0.0 10e3/uL   Occult blood stool   Result Value Ref Range    Occult Blood Positive (A) Negative       Medications - No data to display    Assessments &  Plan (with Medical Decision Making)     I have reviewed the nursing notes.    I have reviewed the findings, diagnosis, plan and need for follow up with the patient.  Hemoglobin is stable.  She did have a bowel movement here which had some formed brown stool with a small amount of bright red blood as well.  I believe this is most likely hemorrhoid.  Sounds like the bleeding is slowing down.  I think she is safe to go home.  Creatinine was markedly elevated, however she is on dialysis.  She is to dialyze Monday, the day after tomorrow.  Potassium is still within normal limits.  Return if worse.    New Prescriptions    No medications on file       Final diagnoses:   BRBPR (bright red blood per rectum)       7/24/2021   [facility] AND [facility]     Josias Finnegan MD  07/24/21 3601

## 2021-07-24 NOTE — ED TRIAGE NOTES
"EMS Arrival Note  ________________________________  Lucila Willett is a 65 year old Female that arrives via [facility] ambulance service from [facility]  Pre hospital clinical presentation per patient  includes bright red blood per rectum. States it is \"a lot\" in toilet. nPre hospital personnel report vital signs of:  B/P 109/48; HR 97, RR 18;SpO2 98  Pre Hospital Cardiac rhythm reported as Normal Sinus  Patient arrives with:  GCS 15  Airway intact  Breathing Assessment Normal  Circulation Assessment Normal    Placed in room 905, gowned, warm blanket provided, side rails up,  ID verified and band placed, and call light within reach.       Previous living situation Alone    "

## 2021-08-07 ENCOUNTER — TRANSFERRED RECORDS (OUTPATIENT)
Dept: HEALTH INFORMATION MANAGEMENT | Facility: OTHER | Age: 66
End: 2021-08-07

## 2021-08-07 ENCOUNTER — HOSPITAL ENCOUNTER (EMERGENCY)
Facility: OTHER | Age: 66
Discharge: ANOTHER HOSPITAL | End: 2021-08-07
Attending: EMERGENCY MEDICINE | Admitting: EMERGENCY MEDICINE
Payer: MEDICARE

## 2021-08-07 ENCOUNTER — APPOINTMENT (OUTPATIENT)
Dept: GENERAL RADIOLOGY | Facility: OTHER | Age: 66
End: 2021-08-07
Attending: EMERGENCY MEDICINE
Payer: MEDICARE

## 2021-08-07 ENCOUNTER — APPOINTMENT (OUTPATIENT)
Dept: CT IMAGING | Facility: OTHER | Age: 66
End: 2021-08-07
Attending: EMERGENCY MEDICINE
Payer: MEDICARE

## 2021-08-07 VITALS
WEIGHT: 287.6 LBS | HEART RATE: 89 BPM | RESPIRATION RATE: 11 BRPM | OXYGEN SATURATION: 98 % | DIASTOLIC BLOOD PRESSURE: 48 MMHG | BODY MASS INDEX: 49.1 KG/M2 | SYSTOLIC BLOOD PRESSURE: 102 MMHG | TEMPERATURE: 97 F | HEIGHT: 64 IN

## 2021-08-07 DIAGNOSIS — E86.1 HYPOTENSION DUE TO HYPOVOLEMIA: ICD-10-CM

## 2021-08-07 DIAGNOSIS — I21.29 ST ELEVATION MYOCARDIAL INFARCTION (STEMI) INVOLVING OTHER CORONARY ARTERY (H): ICD-10-CM

## 2021-08-07 LAB
ALBUMIN SERPL-MCNC: 3.7 G/DL (ref 3.5–5.7)
ALP SERPL-CCNC: 68 U/L (ref 34–104)
ALT SERPL W P-5'-P-CCNC: 24 U/L (ref 7–52)
ANION GAP SERPL CALCULATED.3IONS-SCNC: 11 MMOL/L (ref 3–14)
AST SERPL W P-5'-P-CCNC: 16 U/L (ref 13–39)
BASOPHILS # BLD AUTO: 0.1 10E3/UL (ref 0–0.2)
BASOPHILS NFR BLD AUTO: 1 %
BILIRUB SERPL-MCNC: 0.3 MG/DL (ref 0.3–1)
BUN SERPL-MCNC: 19 MG/DL (ref 7–25)
CALCIUM SERPL-MCNC: 9.1 MG/DL (ref 8.6–10.3)
CHLORIDE BLD-SCNC: 95 MMOL/L (ref 98–107)
CO2 SERPL-SCNC: 31 MMOL/L (ref 21–31)
CREAT SERPL-MCNC: 3.6 MG/DL (ref 0.6–1.2)
EOSINOPHIL # BLD AUTO: 0.3 10E3/UL (ref 0–0.7)
EOSINOPHIL NFR BLD AUTO: 3 %
ERYTHROCYTE [DISTWIDTH] IN BLOOD BY AUTOMATED COUNT: 17.7 % (ref 10–15)
GFR SERPL CREATININE-BSD FRML MDRD: 13 ML/MIN/1.73M2
GLUCOSE BLD-MCNC: 105 MG/DL (ref 70–105)
HCT VFR BLD AUTO: 31 % (ref 35–47)
HGB BLD-MCNC: 9.7 G/DL (ref 11.7–15.7)
IMM GRANULOCYTES # BLD: 0.3 10E3/UL
IMM GRANULOCYTES NFR BLD: 3 %
LYMPHOCYTES # BLD AUTO: 1.8 10E3/UL (ref 0.8–5.3)
LYMPHOCYTES NFR BLD AUTO: 20 %
MCH RBC QN AUTO: 35.4 PG (ref 26.5–33)
MCHC RBC AUTO-ENTMCNC: 31.3 G/DL (ref 31.5–36.5)
MCV RBC AUTO: 113 FL (ref 78–100)
MONOCYTES # BLD AUTO: 1.8 10E3/UL (ref 0–1.3)
MONOCYTES NFR BLD AUTO: 20 %
NEUTROPHILS # BLD AUTO: 4.8 10E3/UL (ref 1.6–8.3)
NEUTROPHILS NFR BLD AUTO: 53 %
NRBC # BLD AUTO: 0 10E3/UL
NRBC BLD AUTO-RTO: 0 /100
PLATELET # BLD AUTO: 265 10E3/UL (ref 150–450)
POTASSIUM BLD-SCNC: 4 MMOL/L (ref 3.5–5.1)
PROT SERPL-MCNC: 6 G/DL (ref 6.4–8.9)
RBC # BLD AUTO: 2.74 10E6/UL (ref 3.8–5.2)
SARS-COV-2 RNA RESP QL NAA+PROBE: NEGATIVE
SODIUM SERPL-SCNC: 137 MMOL/L (ref 134–144)
TROPONIN I SERPL-MCNC: 23.4 PG/ML (ref 0–34)
WBC # BLD AUTO: 9 10E3/UL (ref 4–11)

## 2021-08-07 PROCEDURE — 93010 ELECTROCARDIOGRAM REPORT: CPT | Mod: 76 | Performed by: INTERNAL MEDICINE

## 2021-08-07 PROCEDURE — 71045 X-RAY EXAM CHEST 1 VIEW: CPT

## 2021-08-07 PROCEDURE — C9803 HOPD COVID-19 SPEC COLLECT: HCPCS | Performed by: EMERGENCY MEDICINE

## 2021-08-07 PROCEDURE — 93005 ELECTROCARDIOGRAM TRACING: CPT | Performed by: EMERGENCY MEDICINE

## 2021-08-07 PROCEDURE — 85025 COMPLETE CBC W/AUTO DIFF WBC: CPT | Performed by: EMERGENCY MEDICINE

## 2021-08-07 PROCEDURE — 250N000013 HC RX MED GY IP 250 OP 250 PS 637: Mod: GY | Performed by: EMERGENCY MEDICINE

## 2021-08-07 PROCEDURE — 250N000011 HC RX IP 250 OP 636: Performed by: EMERGENCY MEDICINE

## 2021-08-07 PROCEDURE — 99291 CRITICAL CARE FIRST HOUR: CPT | Performed by: EMERGENCY MEDICINE

## 2021-08-07 PROCEDURE — 258N000003 HC RX IP 258 OP 636: Performed by: EMERGENCY MEDICINE

## 2021-08-07 PROCEDURE — 74177 CT ABD & PELVIS W/CONTRAST: CPT | Mod: ME

## 2021-08-07 PROCEDURE — 80053 COMPREHEN METABOLIC PANEL: CPT | Performed by: EMERGENCY MEDICINE

## 2021-08-07 PROCEDURE — 96365 THER/PROPH/DIAG IV INF INIT: CPT | Performed by: EMERGENCY MEDICINE

## 2021-08-07 PROCEDURE — 93005 ELECTROCARDIOGRAM TRACING: CPT | Mod: 76 | Performed by: EMERGENCY MEDICINE

## 2021-08-07 PROCEDURE — 84484 ASSAY OF TROPONIN QUANT: CPT | Performed by: EMERGENCY MEDICINE

## 2021-08-07 PROCEDURE — 99291 CRITICAL CARE FIRST HOUR: CPT | Mod: 25 | Performed by: EMERGENCY MEDICINE

## 2021-08-07 PROCEDURE — 36415 COLL VENOUS BLD VENIPUNCTURE: CPT | Performed by: EMERGENCY MEDICINE

## 2021-08-07 PROCEDURE — U0005 INFEC AGEN DETEC AMPLI PROBE: HCPCS | Performed by: EMERGENCY MEDICINE

## 2021-08-07 RX ORDER — ASPIRIN 81 MG/1
162 TABLET, CHEWABLE ORAL ONCE
Status: COMPLETED | OUTPATIENT
Start: 2021-08-07 | End: 2021-08-07

## 2021-08-07 RX ORDER — SODIUM CHLORIDE 9 MG/ML
1000 INJECTION, SOLUTION INTRAVENOUS CONTINUOUS
Status: DISCONTINUED | OUTPATIENT
Start: 2021-08-07 | End: 2021-08-07 | Stop reason: HOSPADM

## 2021-08-07 RX ORDER — HEPARIN SODIUM 5000 [USP'U]/.5ML
4000 INJECTION, SOLUTION INTRAVENOUS; SUBCUTANEOUS ONCE
Status: COMPLETED | OUTPATIENT
Start: 2021-08-07 | End: 2021-08-07

## 2021-08-07 RX ORDER — HEPARIN SODIUM 10000 [USP'U]/100ML
0-5000 INJECTION, SOLUTION INTRAVENOUS CONTINUOUS
Status: DISCONTINUED | OUTPATIENT
Start: 2021-08-07 | End: 2021-08-07 | Stop reason: HOSPADM

## 2021-08-07 RX ORDER — IOPAMIDOL 755 MG/ML
100 INJECTION, SOLUTION INTRAVASCULAR ONCE
Status: COMPLETED | OUTPATIENT
Start: 2021-08-07 | End: 2021-08-07

## 2021-08-07 RX ADMIN — ASPIRIN 81 MG CHEWABLE TABLET 162 MG: 81 TABLET CHEWABLE at 18:40

## 2021-08-07 RX ADMIN — HEPARIN SODIUM 4000 UNITS: 10000 INJECTION, SOLUTION INTRAVENOUS; SUBCUTANEOUS at 18:46

## 2021-08-07 RX ADMIN — TICAGRELOR 180 MG: 90 TABLET ORAL at 18:41

## 2021-08-07 RX ADMIN — IOPAMIDOL 100 ML: 755 INJECTION, SOLUTION INTRAVENOUS at 18:32

## 2021-08-07 RX ADMIN — HEPARIN SODIUM 1000 UNITS/HR: 10000 INJECTION, SOLUTION INTRAVENOUS at 19:02

## 2021-08-07 RX ADMIN — SODIUM CHLORIDE 500 ML: 9 INJECTION, SOLUTION INTRAVENOUS at 17:16

## 2021-08-07 ASSESSMENT — MIFFLIN-ST. JEOR: SCORE: 1829.55

## 2021-08-07 NOTE — ED PROVIDER NOTES
History     Chief Complaint   Patient presents with     Hypotension     HPI  Lucila Willett is a 65 year old female who presents with hypotension.  She has been having low blood pressure measurements for 4 days.  She is also been noticing some increased lower extremity edema.  She is a dialysis patient and was brought for an extra run of dialysis today for the lower extremity edema, however she was noted to have low blood pressures, with systolic as low as 69.  Patient reports she has been feeling lightheaded for the past few days and is also noted some right upper quadrant abdominal pain radiating to her back in addition to her chronic neck pain.  She had about 5 days of bright red rectal bleeding, was evaluated for that in the emergency department, thought to be hemorrhoids, has resolved.  She is on blood pressure medications but does not know what, thinks they may have been adjusted earlier this week (metoprolol listed in her chart).  She denies fever, chest pain, shortness of breath, nausea/vomiting.  She has had some issues with her right leg due to previous cellulitis but does not have any active rashes.    She has a high past medical history of diabetes, end-stage renal disease on dialysis, heart failure.  She has a history of coronary bypass, cholecystectomy, 3 C-sections.  She is a former smoker.  Denies alcohol or drug use.    Allergies:  Allergies   Allergen Reactions     Cetirizine      Other reaction(s): *Unknown - Pt Doesn't Remember     Pregabalin      Other reaction(s): Other - Describe In Comment Field  Floaters in eyes.      Latex Rash     Patient states she gets a rash     Rosiglitazone Rash     Patient states she gets a rash     Zosyn [Piperacillin-Tazobactam In D5w] Rash       Problem List:    Patient Active Problem List    Diagnosis Date Noted     Cellulitis 08/21/2020     Priority: Medium     Leukocytosis 12/19/2016     Priority: Medium     Hyponatremia 06/10/2016     Priority: Medium      Cellulitis of left lower extremity 06/09/2016     Priority: Medium     Severe sepsis (H) 06/09/2016     Priority: Medium     Foot ulcer (H) 01/11/2016     Priority: Medium     Diastolic heart failure (H) 07/01/2015     Priority: Medium     Chronic pain disorder 06/30/2015     Priority: Medium     Overview:   Overview:   [facility] Agreement for Opioid Treatment  PHYSICIAN:  Gauri Grimm MD  PHARMACY:  [facility] in [place], [place]  PHARMACY PHONE:  [phone]    Last two urine drug screens have been negative - on 1/7/13 it had been two days since she took lortab.  Negative for norco aug 2015       Type 2 diabetes mellitus (H) 06/11/2013     Priority: Medium     Overview:   Overview:   LDL 94 - on crestor  Off valsartan because of worsening renal function ? Hypotension (10/2014: will retry low dose lisinopril)  On ASA  Right diabetic retinopathy on right 2/2014       Hypokalemia 09/13/2012     Priority: Medium     Anemia 09/12/2012     Priority: Medium     Cellulitis and abscess of leg, except foot 09/12/2012     Priority: Medium     Diabetes mellitus, type II (H) 09/12/2012     Priority: Medium     Overview:   a system change updated this record. This will not affect patient care or billing. This comment can be deleted.       Hypertensive heart failure with end stage renal disease (H) 09/12/2012     Priority: Medium     Morbid obesity (H) 09/12/2012     Priority: Medium     Chronic kidney disease, stage IV (severe) (H) 03/23/2012     Priority: Medium     Overview:   Overview:   IMO Update 10/11       Atherosclerosis of coronary artery 03/17/2010     Priority: Medium     Overview:   Overview:   IMO Update       Gout 02/29/2008     Priority: Medium     Essential hypertension 02/27/2008     Priority: Medium     Hypothyroidism 02/27/2008     Priority: Medium     Overview:   Overview:   IMO Update 10/11       Sleep apnea 02/27/2008     Priority: Medium     Overview:   Overview:   10/2012: cpap did not work - trying  BIPAP          Past Medical History:    Past Medical History:   Diagnosis Date     Atherosclerotic heart disease of native coronary artery without angina pectoris      Cellulitis      Chronic kidney disease      Diastolic congestive heart failure (H)      Hypothyroidism      Major depressive disorder, single episode      Obesity      Personal history of other medical treatment (CODE)      Sleep apnea      Type 2 diabetes mellitus without complications (H)        Past Surgical History:    Past Surgical History:   Procedure Laterality Date     CHOLECYSTECTOMY      No Comments Provided     OTHER SURGICAL HISTORY      014177,OTHER     OTHER SURGICAL HISTORY      82239.0,MD SPINE FUSION ANTER 3 SGMTS     OTHER SURGICAL HISTORY      LOC-1006.04,CABG     OTHER SURGICAL HISTORY      258817,ENDOSCOPY GI       Family History:    No family history on file.    Social History:  Marital Status:   [4]  Social History     Tobacco Use     Smoking status: Former Smoker     Packs/day: 2.00     Years: 20.00     Pack years: 40.00     Types: Cigarettes     Quit date: 1997     Years since quittin.9     Smokeless tobacco: Never Used   Substance Use Topics     Alcohol use: Yes     Alcohol/week: 0.0 standard drinks     Comment: Alcoholic Drinks/day: Occasionally     Drug use: Unknown     Types: Other     Comment: Drug use: No        Medications:    acetaminophen (TYLENOL) 500 MG tablet  allopurinol (ZYLOPRIM) 100 MG tablet  alum & mag hydroxide-simethicone (MAALOX) 200-200-20 MG/5ML SUSP suspension  aspirin (GOODSENSE ASPIRIN) 325 MG tablet  atorvastatin (LIPITOR) 80 MG tablet  B Complex-C-Folic Acid (DIALYVITE 800 PO)  blood glucose (NO BRAND SPECIFIED) lancets standard  blood glucose monitoring (NO BRAND SPECIFIED) test strip  buPROPion (WELLBUTRIN XL) 150 MG 24 hr tablet  Cholecalciferol (D 5000) 5000 UNITS TABS  clotrimazole (LOTRIMIN) 1 % cream  fexofenadine (ALLEGRA) 60 MG tablet  FLUoxetine (PROZAC) 20 MG  "capsule  fluticasone (FLONASE) 50 MCG/ACT spray  gabapentin (NEURONTIN) 100 MG capsule  guaiFENesin (ROBITUSSIN) 20 mg/mL SOLN solution  hypromellose (GENTEAL) 0.3 % SOLN ophthalmic solution  insulin aspart (NOVOLOG PEN) 100 UNIT/ML injection  insulin aspart (NOVOLOG PEN) 100 UNIT/ML pen  insulin detemir (LEVEMIR PEN) 100 UNIT/ML pen  ipratropium - albuterol 0.5 mg/2.5 mg/3 mL (DUONEB) 0.5-2.5 (3) MG/3ML neb solution  levothyroxine (SYNTHROID/LEVOTHROID) 200 MCG tablet  levothyroxine (SYNTHROID/LEVOTHROID) 25 MCG tablet  Lidocaine (LIDOCARE) 4 % Patch  lidocaine-prilocaine (EMLA) 2.5-2.5 % external cream  magnesium hydroxide (MILK OF MAGNESIA) 400 MG/5ML suspension  metoprolol tartrate (LOPRESSOR) 25 MG tablet  montelukast (SINGULAIR) 10 MG tablet  Needle, Disp, 31G X 5/16\" MISC  nitroGLYcerin (NITROSTAT) 0.4 MG sublingual tablet  nystatin (MYCOSTATIN) 293023 UNIT/GM external powder  ondansetron (ZOFRAN-ODT) 4 MG ODT tab  oxyCODONE IR (ROXICODONE) 5 MG tablet  senna-docusate (SENOKOT-S/PERICOLACE) 8.6-50 MG tablet  sevelamer carbonate (RENVELA) 800 MG tablet  traMADol (ULTRAM) 50 MG tablet          Review of Systems    Physical Exam   BP: (!) 97/3  Pulse: 89  Temp: 97  F (36.1  C)  Resp: 16  Height: 162.6 cm (5' 4\")  Weight: 130.5 kg (287 lb 9.6 oz)  SpO2: 95 %      Physical Exam  Constitutional:       General: She is not in acute distress.     Appearance: She is not toxic-appearing.   HENT:      Head: Normocephalic and atraumatic.      Nose: Nose normal.      Mouth/Throat:      Mouth: Mucous membranes are moist.      Pharynx: Oropharynx is clear.   Eyes:      Extraocular Movements: Extraocular movements intact.      Conjunctiva/sclera: Conjunctivae normal.      Pupils: Pupils are equal, round, and reactive to light.   Cardiovascular:      Rate and Rhythm: Normal rate and regular rhythm.   Pulmonary:      Effort: Pulmonary effort is normal.      Breath sounds: Normal breath sounds.   Abdominal:      Palpations: " Abdomen is soft.      Tenderness: There is abdominal tenderness (RUQ/R flank).   Musculoskeletal:      Cervical back: Normal range of motion.      Comments: +1 BL lower extremity edema, no erythema. Chronic venous stasis changes   Skin:     General: Skin is warm and dry.   Neurological:      Mental Status: She is alert and oriented to person, place, and time.         ED Course     ED Course as of Aug 07 1919   Sat Aug 07, 2021   1717 Dropped one point from a week ago   Hemoglobin(!): 9.7   1832 Patient reporting chest tightness following CT. No dyspnea, hives, facial swelling, throat closing. No known previous reaction to contrast but unsure if she has received in the past. Vital signs unchanged. Does not appear to be allergic reaction. New EKG being obtained      1839 Repeat EKG shows diffuse ST depressiosn most notable in anterior leads with elevation in aVR. STEMI code called. Contacting [facility].      1849 Will accept at [facility], transfer to ED for stat echo, consider cath lab.      1919 CT A/P negative        Procedures              EKG Interpretation:      Interpreted by Hilary Reveles MD  Time reviewed: 1604  Symptoms at time of EKG: lightheadedness   Rhythm: normal sinus   Rate: Normal  Axis: Normal  Ectopy: none  Conduction: normal  ST Segments/ T Waves: Non-specific ST-T wave changes  Q Waves: III  Comparison to prior: Unchanged    Clinical Impression: no acute changes                Critical Care time:  was 35 minutes for this patient excluding procedures.  Reexamination, discussion with specialist, arrange transfer, monitor and address abnormal vital signs (fluids)               Results for orders placed or performed during the hospital encounter of 08/07/21 (from the past 24 hour(s))   CBC with platelets differential    Narrative    The following orders were created for panel order CBC with platelets differential.  Procedure                               Abnormality         Status                      ---------                               -----------         ------                     CBC with platelets and d...[[id number]]  Abnormal            Final result                 Please view results for these tests on the individual orders.   Comprehensive metabolic panel   Result Value Ref Range    Sodium 137 134 - 144 mmol/L    Potassium 4.0 3.5 - 5.1 mmol/L    Chloride 95 (L) 98 - 107 mmol/L    Carbon Dioxide (CO2) 31 21 - 31 mmol/L    Anion Gap 11 3 - 14 mmol/L    Urea Nitrogen 19 7 - 25 mg/dL    Creatinine 3.60 (H) 0.60 - 1.20 mg/dL    Calcium 9.1 8.6 - 10.3 mg/dL    Glucose 105 70 - 105 mg/dL    Alkaline Phosphatase 68 34 - 104 U/L    AST 16 13 - 39 U/L    ALT 24 7 - 52 U/L    Protein Total 6.0 (L) 6.4 - 8.9 g/dL    Albumin 3.7 3.5 - 5.7 g/dL    Bilirubin Total 0.3 0.3 - 1.0 mg/dL    GFR Estimate 13 (L) >60 mL/min/1.73m2   Troponin I (now)   Result Value Ref Range    Troponin I 23.4 0.0 - 34.0 pg/mL   CBC with platelets and differential   Result Value Ref Range    WBC Count 9.0 4.0 - 11.0 10e3/uL    RBC Count 2.74 (L) 3.80 - 5.20 10e6/uL    Hemoglobin 9.7 (L) 11.7 - 15.7 g/dL    Hematocrit 31.0 (L) 35.0 - 47.0 %     (H) 78 - 100 fL    MCH 35.4 (H) 26.5 - 33.0 pg    MCHC 31.3 (L) 31.5 - 36.5 g/dL    RDW 17.7 (H) 10.0 - 15.0 %    Platelet Count 265 150 - 450 10e3/uL    % Neutrophils 53 %    % Lymphocytes 20 %    % Monocytes 20 %    % Eosinophils 3 %    % Basophils 1 %    % Immature Granulocytes 3 %    NRBCs per 100 WBC 0 <1 /100    Absolute Neutrophils 4.8 1.6 - 8.3 10e3/uL    Absolute Lymphocytes 1.8 0.8 - 5.3 10e3/uL    Absolute Monocytes 1.8 (H) 0.0 - 1.3 10e3/uL    Absolute Eosinophils 0.3 0.0 - 0.7 10e3/uL    Absolute Basophils 0.1 0.0 - 0.2 10e3/uL    Absolute Immature Granulocytes 0.3 (H) <=0.0 10e3/uL    Absolute NRBCs 0.0 10e3/uL   XR Chest Port 1 View    Narrative    Exam:  XR CHEST PORT 1 VIEW    HISTORY: hypotension, leg swelling, eval fluid overload.    COMPARISON:  12/5/2019    FINDINGS:      The cardiomediastinal contours are stable.     No focal consolidation, effusion, or pneumothorax.      No acute osseous abnormality. Prior median sternotomy. No  subdiaphragmatic free air.      Impression    IMPRESSION:      No acute cardiopulmonary process.  Stable enlargement of the cardiac  silhouette.    LEOBARDO EDWARD MD         SYSTEM ID:  [id number]   CT Abdomen Pelvis w Contrast    Narrative    Exam: CT ABDOMEN PELVIS W CONTRAST    Exam reason: Abdominal pain, acute, nonlocalized    Technique: Using helical CT technique, axial images of the abdomen and  pelvis were obtained with IV contrast.      Meds/Contrast: Isovue-370, 100 mL    Comparison: None.     FINDINGS:    ABDOMEN:    Liver: No mass or any significant abnormality.  Gallbladder: Resected.   Bile Ducts: There is mild dilation of the extra hepatic common bile  duct, which is likely due to the postcholecystectomy setting.   Spleen:  No splenomegaly or focal lesion.  Pancreas: No mass, ductal dilatation, or inflammatory changes. There  is a calcification within the uncinate process, near the main  pancreatic duct, with no associated duct dilation.  Kidneys: No solid mass, hydronephrosis, or any definite calculi.   Adrenals:  No nodules.   Lymph Nodes: No adenopathy.   Vascular: No aortic aneurysm.   Abdominal Wall: Negative.     PELVIS:   No mass or adenopathy.     Bowel/Mesentery/Peritoneum:   -No bowel obstruction.   -No acute inflammatory findings.  -No ascites.    Visualized portions of the Chest: Bibasilar atelectasis and/or  scarring.  Musculoskeletal: No acute findings. Prior median sternotomy. Grade 1  anterolisthesis of L4 on L5, likely related bilateral facet  arthropathy.      Impression    IMPRESSION:  No acute findings within the abdomen or pelvis.    LEOBARDO EDWARD MD         SYSTEM ID:  [id number]   Asymptomatic COVID-19 Virus (Coronavirus) by PCR Nasopharyngeal    Specimen: Nasopharyngeal; Swab    Narrative    The following  orders were created for panel order Asymptomatic COVID-19 Virus (Coronavirus) by PCR Nasopharyngeal.  Procedure                               Abnormality         Status                     ---------                               -----------         ------                     SARS-COV2 (COVID-19) Vir...[[id number]]                      In process                   Please view results for these tests on the individual orders.       Medications   sodium chloride 0.9% infusion (0 mLs Intravenous Stopped 8/7/21 1816)   heparin 100 unit/mL in D5W ANTICOAGULANT infusion (1,000 Units/hr Intravenous New Bag 8/7/21 1902)   0.9% sodium chloride BOLUS (0 mLs Intravenous Stopped 8/7/21 1816)   iopamidol (ISOVUE-370) solution 100 mL (100 mLs Intravenous Given 8/7/21 1832)   aspirin (ASA) chewable tablet 162 mg (162 mg Oral Given 8/7/21 1840)   ticagrelor (BRILINTA) tablet 180 mg (180 mg Oral Given 8/7/21 1841)   heparin loading dose for LOW INTENSITY TREATMENT* Give BEFORE starting heparin infusion (4,000 Units Intravenous Given 8/7/21 1846)       Assessments & Plan (with Medical Decision Making)     I have reviewed the nursing notes.    I have reviewed the findings, diagnosis, plan and need for follow up with the patient.   Ms Willett is a 65-year-old woman who presents to the emergency department with hypotension, lightheadedness, and right abdominal pain.  She has undergone increased diuresis this week due to increased lower extremity edema and has been having low blood pressure measurements for the past 4 days.  Suspect she has been over diuresed, will give fluids.  We will also evaluate for electrolyte abnormalities, evidence of cardiac ischemia, and will obtain CT to look for intraabdominal pathology such as colitis, diverticulitis, nethrolithiasis.    New Prescriptions    No medications on file       Final diagnoses:   ST elevation myocardial infarction (STEMI) involving other coronary artery (H)   Hypotension due to  hypovolemia       8/7/2021   [facility]     Hilary Reveles MD  08/07/21 1856       Hilary Reveles MD  08/07/21 1919

## 2021-08-07 NOTE — ED TRIAGE NOTES
EMS Arrival Note  ________________________________  Lucila Willett is a 65 year old Female that arrives via [facility] ALS ambulance service from Dialysis  Pre hospital clinical presentation per EMS personnel includes pt was at Dialysis today for hypotension.  Pt also complaining of dizziness.  .  Pre hospital personnel report vital signs of:  B/P 69/50; HR 89, RR 16;SpO2 94  RA  Pre Hospital Cardiac rhythm reported as   Patient arrives with:  GCS Total = 15  Airway intact  Breathing Assessment Normal  Circulation Assessment Abnormal - hypotension  Patient arrives with a 20 IV at her left hand with 50  ml of normal saline infused upon arrival.    Placed in room 901, gowned, warm blanket provided, side rails up,  ID verified and band placed, and call light within reach.       Previous living situation Nursing Home

## 2021-08-07 NOTE — PROGRESS NOTES
1.  Has the patient had a previous reaction to IV contrast? n    2.  Does the patient have kidney disease? y    3.  Is the patient on dialysis? y    If YES to any of these questions, exam will be reviewed with a Radiologist before administering contrast.    IV contrast administration OK per Radiologist ** Dr. Robison       GFR Estimate   Date Value Ref Range Status   08/07/2021 13 (L) >60 mL/min/1.73m2 Final     Comment:     As of July 11, 2021, eGFR is calculated by the CKD-EPI creatinine equation, without race adjustment. eGFR can be influenced by muscle mass, exercise, and diet. The reported eGFR is an estimation only and is only applicable if the renal function is stable.   07/24/2021 8 (L) >60 mL/min/1.73m2 Final     Comment:     As of July 11, 2021, eGFR is calculated by the CKD-EPI creatinine equation, without race adjustment. eGFR can be influenced by muscle mass, exercise, and diet. The reported eGFR is an estimation only and is only applicable if the renal function is stable.   08/21/2020 18 (L) >60 mL/min/[1.73_m2] Final   12/05/2019 13 (L) >60 mL/min/[1.73_m2] Final   11/27/2019 17 (L) >60 mL/min/[1.73_m2] Final     GFR Estimate If Black   Date Value Ref Range Status   08/21/2020 22 (L) >60 mL/min/[1.73_m2] Final   12/05/2019 16 (L) >60 mL/min/[1.73_m2] Final   11/27/2019 20 (L) >60 mL/min/[1.73_m2] Final     Creatinine   Date Value Ref Range Status   08/07/2021 3.60 (H) 0.60 - 1.20 mg/dL Final   08/21/2020 2.64 (H) 0.60 - 1.20 mg/dL Final

## 2021-08-07 NOTE — ED NOTES
Pt back from CT started complaining of chest pain and shortness of breath.  RN and MD in room, EKG obtained.  Dr. smiley wanted to call STEMI.  STEMI team activated.

## 2021-08-09 LAB
ALT SERPL-CCNC: 27 IU/L (ref 6–31)
AST SERPL-CCNC: 17 IU/L (ref 10–40)

## 2021-08-10 LAB
ATRIAL RATE - MUSE: 88 BPM
ATRIAL RATE - MUSE: 90 BPM
DIASTOLIC BLOOD PRESSURE - MUSE: NORMAL MMHG
DIASTOLIC BLOOD PRESSURE - MUSE: NORMAL MMHG
INTERPRETATION ECG - MUSE: NORMAL
INTERPRETATION ECG - MUSE: NORMAL
P AXIS - MUSE: 73 DEGREES
P AXIS - MUSE: 82 DEGREES
PR INTERVAL - MUSE: 166 MS
PR INTERVAL - MUSE: 190 MS
QRS DURATION - MUSE: 100 MS
QRS DURATION - MUSE: 98 MS
QT - MUSE: 390 MS
QT - MUSE: 390 MS
QTC - MUSE: 471 MS
QTC - MUSE: 477 MS
R AXIS - MUSE: 30 DEGREES
R AXIS - MUSE: 38 DEGREES
SYSTOLIC BLOOD PRESSURE - MUSE: NORMAL MMHG
SYSTOLIC BLOOD PRESSURE - MUSE: NORMAL MMHG
T AXIS - MUSE: 177 DEGREES
T AXIS - MUSE: 96 DEGREES
VENTRICULAR RATE- MUSE: 88 BPM
VENTRICULAR RATE- MUSE: 90 BPM

## 2021-08-12 LAB — TSH SERPL-ACNC: 7.13 UIU/ML (ref 0.4–3.99)

## 2021-08-13 ENCOUNTER — APPOINTMENT (OUTPATIENT)
Dept: GENERAL RADIOLOGY | Facility: OTHER | Age: 66
End: 2021-08-13
Attending: PHYSICIAN ASSISTANT
Payer: MEDICARE

## 2021-08-13 ENCOUNTER — HOSPITAL ENCOUNTER (EMERGENCY)
Facility: OTHER | Age: 66
Discharge: OTHER INSTITUTION | End: 2021-08-13
Attending: PHYSICIAN ASSISTANT | Admitting: PHYSICIAN ASSISTANT
Payer: MEDICARE

## 2021-08-13 VITALS
HEART RATE: 81 BPM | DIASTOLIC BLOOD PRESSURE: 57 MMHG | SYSTOLIC BLOOD PRESSURE: 130 MMHG | WEIGHT: 293 LBS | RESPIRATION RATE: 27 BRPM | BODY MASS INDEX: 52.58 KG/M2 | OXYGEN SATURATION: 95 % | TEMPERATURE: 99 F

## 2021-08-13 DIAGNOSIS — S82.852A LEFT TRIMALLEOLAR FRACTURE, CLOSED, INITIAL ENCOUNTER: ICD-10-CM

## 2021-08-13 DIAGNOSIS — S80.01XA CONTUSION OF KNEE AND LOWER LEG, RIGHT, INITIAL ENCOUNTER: ICD-10-CM

## 2021-08-13 DIAGNOSIS — S80.11XA CONTUSION OF KNEE AND LOWER LEG, RIGHT, INITIAL ENCOUNTER: ICD-10-CM

## 2021-08-13 DIAGNOSIS — S93.05XA ANKLE DISLOCATION, LEFT, INITIAL ENCOUNTER: ICD-10-CM
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
## 2021-08-13 LAB
CREATININE (EXTERNAL): 5.35 MG/DL (ref 0.4–1)
GFR ESTIMATED (EXTERNAL): 8 ML/MIN/1.73M2
GLUCOSE (EXTERNAL): 97 MG/DL (ref 70–99)
POTASSIUM (EXTERNAL): 4.1 MEQ/L (ref 3.4–5.1)
SARS-COV-2 RNA RESP QL NAA+PROBE: NEGATIVE

## 2021-08-13 PROCEDURE — 99285 EMERGENCY DEPT VISIT HI MDM: CPT | Mod: 25 | Performed by: PHYSICIAN ASSISTANT

## 2021-08-13 PROCEDURE — 73590 X-RAY EXAM OF LOWER LEG: CPT | Mod: RT

## 2021-08-13 PROCEDURE — 27818 TREATMENT OF ANKLE FRACTURE: CPT | Performed by: PHYSICIAN ASSISTANT

## 2021-08-13 PROCEDURE — 73610 X-RAY EXAM OF ANKLE: CPT | Mod: LT

## 2021-08-13 PROCEDURE — 27818 TREATMENT OF ANKLE FRACTURE: CPT | Mod: 54 | Performed by: PHYSICIAN ASSISTANT

## 2021-08-13 PROCEDURE — 71045 X-RAY EXAM CHEST 1 VIEW: CPT

## 2021-08-13 PROCEDURE — 250N000011 HC RX IP 250 OP 636: Performed by: PHYSICIAN ASSISTANT

## 2021-08-13 PROCEDURE — U0005 INFEC AGEN DETEC AMPLI PROBE: HCPCS | Performed by: PHYSICIAN ASSISTANT

## 2021-08-13 PROCEDURE — 258N000003 HC RX IP 258 OP 636: Performed by: PHYSICIAN ASSISTANT

## 2021-08-13 PROCEDURE — 96374 THER/PROPH/DIAG INJ IV PUSH: CPT | Performed by: PHYSICIAN ASSISTANT

## 2021-08-13 PROCEDURE — 73600 X-RAY EXAM OF ANKLE: CPT | Mod: LT

## 2021-08-13 RX ORDER — SODIUM CHLORIDE 9 MG/ML
INJECTION, SOLUTION INTRAVENOUS CONTINUOUS
Status: DISCONTINUED | OUTPATIENT
Start: 2021-08-13 | End: 2021-08-14 | Stop reason: HOSPADM

## 2021-08-13 RX ORDER — FENTANYL CITRATE 50 UG/ML
50 INJECTION, SOLUTION INTRAMUSCULAR; INTRAVENOUS EVERY 30 MIN PRN
Status: DISCONTINUED | OUTPATIENT
Start: 2021-08-13 | End: 2021-08-14 | Stop reason: HOSPADM

## 2021-08-13 RX ADMIN — SODIUM CHLORIDE: 9 INJECTION, SOLUTION INTRAVENOUS at 22:26

## 2021-08-13 RX ADMIN — FENTANYL CITRATE 50 MCG: 50 INJECTION, SOLUTION INTRAMUSCULAR; INTRAVENOUS at 22:25

## 2021-08-13 ASSESSMENT — ENCOUNTER SYMPTOMS
ABDOMINAL PAIN: 0
VOMITING: 0
WHEEZING: 0
TREMORS: 0
EYE PAIN: 0
BACK PAIN: 1
STRIDOR: 0
FLANK PAIN: 0
FACIAL SWELLING: 0
NECK PAIN: 0
NAUSEA: 0
FEVER: 0
SEIZURES: 0
SORE THROAT: 0
AGITATION: 0

## 2021-08-14 NOTE — ED TRIAGE NOTES
Patient presents to ED from [facility] via EMS for report of fall from standing, states she was standing and getting ready for bed when she lost her balance and fell, denies hitting head, c/o neck and back pain from laying on floor as well as RLE pain. Bruising and swelling noted to RLE.

## 2021-08-14 NOTE — ED PROVIDER NOTES
History     Chief Complaint   Patient presents with     Fall     HPI  Lucila Willett is a 66 year old female who resides at [facility].  Tonight she was attempting to change her clothes when she lost her balance and she fell.  Denies hitting her head but she reports hitting her left shoulder, twisting her left ankle and twisting her right leg.  She has noticed that she has had significant bruising to her right lower extremity.  It is swollen but this is normal for her.  She has not been able to put any weight on her ankle since the fall.  She is here for further evaluation at this time.    Allergies:  Allergies   Allergen Reactions     Cetirizine      Other reaction(s): *Unknown - Pt Doesn't Remember     Pregabalin      Other reaction(s): Other - Describe In Comment Field  Floaters in eyes.      Latex Rash     Patient states she gets a rash     Rosiglitazone Rash     Patient states she gets a rash     Zosyn [Piperacillin-Tazobactam In D5w] Rash       Problem List:    Patient Active Problem List    Diagnosis Date Noted     Cellulitis 08/21/2020     Priority: Medium     Leukocytosis 12/19/2016     Priority: Medium     Hyponatremia 06/10/2016     Priority: Medium     Cellulitis of left lower extremity 06/09/2016     Priority: Medium     Severe sepsis (H) 06/09/2016     Priority: Medium     Foot ulcer (H) 01/11/2016     Priority: Medium     Diastolic heart failure (H) 07/01/2015     Priority: Medium     Chronic pain disorder 06/30/2015     Priority: Medium     Overview:   Overview:   [facility] Agreement for Opioid Treatment  PHYSICIAN:  Gauri Grimm MD  PHARMACY:  [facility] in [place], [place]  PHARMACY PHONE:  [phone]    Last two urine drug screens have been negative - on 1/7/13 it had been two days since she took lortab.  Negative for norco aug 2015       Type 2 diabetes mellitus (H) 06/11/2013     Priority: Medium     Overview:   Overview:   LDL 94 - on crestor  Off valsartan because of worsening renal  function ? Hypotension (10/2014: will retry low dose lisinopril)  On ASA  Right diabetic retinopathy on right 2/2014       Hypokalemia 09/13/2012     Priority: Medium     Anemia 09/12/2012     Priority: Medium     Cellulitis and abscess of leg, except foot 09/12/2012     Priority: Medium     Diabetes mellitus, type II (H) 09/12/2012     Priority: Medium     Overview:   a system change updated this record. This will not affect patient care or billing. This comment can be deleted.       Hypertensive heart failure with end stage renal disease (H) 09/12/2012     Priority: Medium     Morbid obesity (H) 09/12/2012     Priority: Medium     Chronic kidney disease, stage IV (severe) (H) 03/23/2012     Priority: Medium     Overview:   Overview:   IMO Update 10/11       Atherosclerosis of coronary artery 03/17/2010     Priority: Medium     Overview:   Overview:   IMO Update       Gout 02/29/2008     Priority: Medium     Essential hypertension 02/27/2008     Priority: Medium     Hypothyroidism 02/27/2008     Priority: Medium     Overview:   Overview:   IMO Update 10/11       Sleep apnea 02/27/2008     Priority: Medium     Overview:   Overview:   10/2012: cpap did not work - trying BIPAP          Past Medical History:    Past Medical History:   Diagnosis Date     Atherosclerotic heart disease of native coronary artery without angina pectoris      Cellulitis      Chronic kidney disease      Diastolic congestive heart failure (H)      Hypothyroidism      Major depressive disorder, single episode      Obesity      Personal history of other medical treatment (CODE)      Sleep apnea      Type 2 diabetes mellitus without complications (H)        Past Surgical History:    Past Surgical History:   Procedure Laterality Date     CHOLECYSTECTOMY      No Comments Provided     OTHER SURGICAL HISTORY      991178,OTHER     OTHER SURGICAL HISTORY      08185.0,ND SPINE FUSION ANTER 3 SGMTS     OTHER SURGICAL HISTORY      LOC-1006.04,CABG      OTHER SURGICAL HISTORY      011916,ENDOSCOPY GI       Family History:    No family history on file.    Social History:  Marital Status:   [4]  Social History     Tobacco Use     Smoking status: Former Smoker     Packs/day: 2.00     Years: 20.00     Pack years: 40.00     Types: Cigarettes     Quit date: 1997     Years since quittin.9     Smokeless tobacco: Never Used   Substance Use Topics     Alcohol use: Yes     Alcohol/week: 0.0 standard drinks     Comment: Alcoholic Drinks/day: Occasionally     Drug use: Unknown     Types: Other     Comment: Drug use: No        Medications:    acetaminophen (TYLENOL) 500 MG tablet  allopurinol (ZYLOPRIM) 100 MG tablet  alum & mag hydroxide-simethicone (MAALOX) 200-200-20 MG/5ML SUSP suspension  aspirin (GOODSENSE ASPIRIN) 325 MG tablet  atorvastatin (LIPITOR) 80 MG tablet  B Complex-C-Folic Acid (DIALYVITE 800 PO)  blood glucose (NO BRAND SPECIFIED) lancets standard  blood glucose monitoring (NO BRAND SPECIFIED) test strip  buPROPion (WELLBUTRIN XL) 150 MG 24 hr tablet  Cholecalciferol (D 5000) 5000 UNITS TABS  clotrimazole (LOTRIMIN) 1 % cream  fexofenadine (ALLEGRA) 60 MG tablet  FLUoxetine (PROZAC) 20 MG capsule  fluticasone (FLONASE) 50 MCG/ACT spray  gabapentin (NEURONTIN) 100 MG capsule  guaiFENesin (ROBITUSSIN) 20 mg/mL SOLN solution  hypromellose (GENTEAL) 0.3 % SOLN ophthalmic solution  insulin aspart (NOVOLOG PEN) 100 UNIT/ML injection  insulin aspart (NOVOLOG PEN) 100 UNIT/ML pen  insulin detemir (LEVEMIR PEN) 100 UNIT/ML pen  ipratropium - albuterol 0.5 mg/2.5 mg/3 mL (DUONEB) 0.5-2.5 (3) MG/3ML neb solution  levothyroxine (SYNTHROID/LEVOTHROID) 200 MCG tablet  levothyroxine (SYNTHROID/LEVOTHROID) 25 MCG tablet  Lidocaine (LIDOCARE) 4 % Patch  lidocaine-prilocaine (EMLA) 2.5-2.5 % external cream  magnesium hydroxide (MILK OF MAGNESIA) 400 MG/5ML suspension  metoprolol tartrate (LOPRESSOR) 25 MG tablet  montelukast (SINGULAIR) 10 MG tablet  Needle,  "Disp, 31G X 5/16\" MISC  nitroGLYcerin (NITROSTAT) 0.4 MG sublingual tablet  nystatin (MYCOSTATIN) 403296 UNIT/GM external powder  ondansetron (ZOFRAN-ODT) 4 MG ODT tab  oxyCODONE IR (ROXICODONE) 5 MG tablet  senna-docusate (SENOKOT-S/PERICOLACE) 8.6-50 MG tablet  sevelamer carbonate (RENVELA) 800 MG tablet  traMADol (ULTRAM) 50 MG tablet          Review of Systems   Constitutional: Negative for fever.   HENT: Negative for facial swelling and sore throat.    Eyes: Negative for pain.   Respiratory: Negative for wheezing and stridor.    Cardiovascular: Negative for chest pain.   Gastrointestinal: Negative for abdominal pain, nausea and vomiting.   Genitourinary: Negative for flank pain.   Musculoskeletal: Positive for back pain. Negative for neck pain.        Some mid thoracic pain, left ankle pain and deformity, right lower extremity with significant bruising.   Skin: Negative for pallor.   Neurological: Negative for tremors and seizures.   Psychiatric/Behavioral: Negative for agitation.   All other systems reviewed and are negative.      Physical Exam   BP: (!) 132/102  Pulse: 71  Temp: 99  F (37.2  C)  Resp: 18  Weight: 138.9 kg (306 lb 4.8 oz)  SpO2: 96 %    Vitals:    08/13/21 1930 08/13/21 1945 08/13/21 2000 08/13/21 2100   BP: 136/64 (!) 177/71 (!) 171/71 136/58   Pulse: 70 71 71 78   Resp:    17   Temp:       TempSrc:       SpO2: 99% 99% 99% 95%   Weight:           Physical Exam  Vitals and nursing note reviewed.   Constitutional:       General: She is not in acute distress.     Appearance: Normal appearance. She is not ill-appearing or toxic-appearing.   HENT:      Head: Normocephalic. No raccoon eyes, right periorbital erythema or left periorbital erythema.      Right Ear: No drainage or tenderness. Tympanic membrane is not perforated.      Left Ear: No drainage or tenderness. Tympanic membrane is not perforated.      Nose: Nose normal.   Eyes:      General: Lids are normal. Gaze aligned appropriately. No " scleral icterus.     Extraocular Movements: Extraocular movements intact.   Neck:      Trachea: No tracheal deviation.   Cardiovascular:      Rate and Rhythm: Normal rate.   Pulmonary:      Effort: Pulmonary effort is normal. No respiratory distress.      Breath sounds: No stridor. No wheezing.      Comments: Lung sounds clear.  Some tenderness to palpation to the posterior chest wall.  No crepitus or SQ E.  No tachypnea or respiratory distress.  SaO2 is 96% on room air.  Abdominal:      Tenderness: There is no abdominal tenderness.   Musculoskeletal:         General: Swelling, tenderness, deformity and signs of injury present.      Cervical back: Normal range of motion. No signs of trauma.      Comments: Some mid thoracic pain, left ankle pain and deformity, right lower extremity with significant bruising.  She has good distal capillary refill and otherwise is neurologically intact.  She is unable to move her left ankle due to pain but she can wiggle her right ankle.  Calf is supple at this time no signs of significant compartment syndrome on examination.   Skin:     General: Skin is warm and dry.      Coloration: Skin is not jaundiced or pale.   Neurological:      General: No focal deficit present.      Mental Status: She is alert and oriented to person, place, and time.      GCS: GCS eye subscore is 4. GCS verbal subscore is 5. GCS motor subscore is 6.      Motor: No tremor or seizure activity.   Psychiatric:         Attention and Perception: Attention normal.         Mood and Affect: Mood normal.         ED Course     PROCEDURE: Left trimalleolar fracture with dislocation reduction and splinting  after verbal consent distal traction was applied to the left ankle and reduction was obtained.  Patient was then splinted in a posterior well-padded Nath type splint however her ankle remained inherently unstable.  Postreduction films show continued displacement.      Results for orders placed or performed during the  hospital encounter of 08/13/21 (from the past 24 hour(s))   XR Ankle Left G/E 3 Views    Narrative    PROCEDURE:  XR ANKLE LEFT G/E 3 VIEWS    HISTORY: fall with pain    COMPARISON:  None.    TECHNIQUE:  3 views of the left ankle were obtained.    FINDINGS:  There is an oblique fracture of the lateral malleolus.  There is approximately 30 degrees of angulation convex anteriorly    There is dislocation of the tibiotalar joint with a posterior  malleolar fracture. There is a transverse fracture of the medial  malleolus. The talus calcaneus are intact.       Impression    IMPRESSION: Trimalleolar fracture of the ankle with dislocation of the  tibiotalar joint    JOSÉ MIGUEL MARIE MD         SYSTEM ID:  [id number]   XR Chest 1 View    Narrative    PROCEDURE:  XR CHEST 1 VIEW    HISTORY:  fall with pain.     COMPARISON:  August 7, 2021    FINDINGS:   The heart is enlarged. Postoperative changes are seen in the  mediastinum. The pulmonary vasculature is normal.  The lungs are  clear. No pleural effusion or pneumothorax.      Impression    IMPRESSION:  Cardiomegaly      JOSÉ MIGUEL MARIE MD         SYSTEM ID:  [id number]   XR Tibia & Fibula Right 2 Views    Narrative    PROCEDURE:  XR TIBIA & FIBULA RT 2 VW    HISTORY: fall with pain    COMPARISON:  None.    TECHNIQUE:  AP and lateral views of the right tibia and fibula were  obtained.    FINDINGS:  There is deformity of the proximal fibula which appears to  be secondary to an old fracture. No acute tibial or fibular fractures  are noted. There is spurring at the insertion of the Achilles tendon  and plantar fascia into the calcaneus.       Impression    IMPRESSION: Proximal fibular deformity consistent with an old  fracture. No acute fractures are seen.    JOSÉ MIGUEL MARIE MD         SYSTEM ID:  [id number]       Medications   sodium chloride 0.9% infusion (has no administration in time range)   fentaNYL (PF) (SUBLIMAZE) injection 50 mcg (has no administration in time range)        Assessments & Plan (with Medical Decision Making)     I have reviewed the nursing notes.    I have reviewed the findings, diagnosis, plan and need for follow up with the patient.      New Prescriptions    No medications on file       Final diagnoses:   Fall at nursing home, initial encounter   Left trimalleolar fracture, closed, initial encounter   Ankle dislocation, left, initial encounter   Contusion of knee and lower leg, right, initial encounter     Afebrile.  Vital signs stable.  Patient with fall at the nursing home injuring her left ankle and her right lower leg.  Significant bruising to the right lower leg but unable to appreciate significant swelling to suggest compartment syndrome.  Left ankle deformity x-ray showed trimalleolar fracture with dislocation.  This was reduced and splinted however this type of fracture is inherently unstable and it redislocated.  I discussed this case with 's RN who verbally relayed my report to him since he was in the OR.  This patient will be transported via ground ambulance to the ER for further medical management and orthopedic evaluation.  Her Covid is pending.  IV established and she was given fentanyl for pain relief.  8/13/2021   [facility]     Ethan Mejia PA-C  08/13/21 5915

## 2021-08-26 ENCOUNTER — LAB REQUISITION (OUTPATIENT)
Dept: LAB | Facility: OTHER | Age: 66
End: 2021-08-26
Payer: MEDICARE

## 2021-08-26 DIAGNOSIS — I13.0 HYPERTENSIVE HEART AND CHRONIC KIDNEY DISEASE WITH HEART FAILURE AND STAGE 1 THROUGH STAGE 4 CHRONIC KIDNEY DISEASE, OR UNSPECIFIED CHRONIC KIDNEY DISEASE (H): ICD-10-CM

## 2021-08-26 DIAGNOSIS — E11.21 TYPE 2 DIABETES MELLITUS WITH DIABETIC NEPHROPATHY (H): ICD-10-CM

## 2021-08-26 DIAGNOSIS — J44.9 CHRONIC OBSTRUCTIVE PULMONARY DISEASE, UNSPECIFIED (H): ICD-10-CM

## 2021-08-26 LAB
ALBUMIN SERPL-MCNC: 3 G/DL (ref 3.5–5.7)
ALP SERPL-CCNC: 113 U/L (ref 34–104)
ALT SERPL W P-5'-P-CCNC: 9 U/L (ref 7–52)
ANION GAP SERPL CALCULATED.3IONS-SCNC: 11 MMOL/L (ref 3–14)
AST SERPL W P-5'-P-CCNC: 23 U/L (ref 13–39)
BASOPHILS # BLD MANUAL: 0 10E3/UL (ref 0–0.2)
BASOPHILS NFR BLD MANUAL: 0 %
BILIRUB SERPL-MCNC: 0.4 MG/DL (ref 0.3–1)
BUN SERPL-MCNC: 25 MG/DL (ref 7–25)
CALCIUM SERPL-MCNC: 9.1 MG/DL (ref 8.6–10.3)
CHLORIDE BLD-SCNC: 94 MMOL/L (ref 98–107)
CO2 SERPL-SCNC: 32 MMOL/L (ref 21–31)
CREAT SERPL-MCNC: 3.77 MG/DL (ref 0.6–1.2)
EOSINOPHIL # BLD MANUAL: 0 10E3/UL (ref 0–0.7)
EOSINOPHIL NFR BLD MANUAL: 0 %
ERYTHROCYTE [DISTWIDTH] IN BLOOD BY AUTOMATED COUNT: 17.2 % (ref 10–15)
GFR SERPL CREATININE-BSD FRML MDRD: 12 ML/MIN/1.73M2
GLUCOSE BLD-MCNC: 134 MG/DL (ref 70–105)
HBA1C MFR BLD: 6 % (ref 4–6.2)
HCT VFR BLD AUTO: 29.6 % (ref 35–47)
HGB BLD-MCNC: 9.4 G/DL (ref 11.7–15.7)
LYMPHOCYTES # BLD MANUAL: 1.8 10E3/UL (ref 0.8–5.3)
LYMPHOCYTES NFR BLD MANUAL: 12 %
MCH RBC QN AUTO: 32 PG (ref 26.5–33)
MCHC RBC AUTO-ENTMCNC: 31.8 G/DL (ref 31.5–36.5)
MCV RBC AUTO: 101 FL (ref 78–100)
METAMYELOCYTES # BLD MANUAL: 0.1 10E3/UL
METAMYELOCYTES NFR BLD MANUAL: 1 %
MONOCYTES # BLD MANUAL: 2.3 10E3/UL (ref 0–1.3)
MONOCYTES NFR BLD MANUAL: 16 %
MYELOCYTES # BLD MANUAL: 0.1 10E3/UL
MYELOCYTES NFR BLD MANUAL: 1 %
NEUTROPHILS # BLD MANUAL: 10.2 10E3/UL (ref 1.6–8.3)
NEUTROPHILS NFR BLD MANUAL: 70 %
PLAT MORPH BLD: ABNORMAL
PLATELET # BLD AUTO: 384 10E3/UL (ref 150–450)
POTASSIUM BLD-SCNC: 3.9 MMOL/L (ref 3.5–5.1)
PROT SERPL-MCNC: 5.5 G/DL (ref 6.4–8.9)
RBC # BLD AUTO: 2.94 10E6/UL (ref 3.8–5.2)
RBC MORPH BLD: ABNORMAL
SODIUM SERPL-SCNC: 137 MMOL/L (ref 134–144)
TSH SERPL DL<=0.005 MIU/L-ACNC: 5.6 MU/L (ref 0.4–4)
WBC # BLD AUTO: 14.6 10E3/UL (ref 4–11)

## 2021-08-26 PROCEDURE — 84443 ASSAY THYROID STIM HORMONE: CPT | Performed by: FAMILY MEDICINE

## 2021-08-26 PROCEDURE — 80053 COMPREHEN METABOLIC PANEL: CPT | Performed by: FAMILY MEDICINE

## 2021-08-26 PROCEDURE — 83036 HEMOGLOBIN GLYCOSYLATED A1C: CPT | Performed by: FAMILY MEDICINE

## 2021-08-26 PROCEDURE — 85027 COMPLETE CBC AUTOMATED: CPT | Performed by: FAMILY MEDICINE

## 2021-09-13 ENCOUNTER — APPOINTMENT (OUTPATIENT)
Dept: CT IMAGING | Facility: OTHER | Age: 66
End: 2021-09-13
Attending: PHYSICIAN ASSISTANT
Payer: MEDICARE

## 2021-09-13 ENCOUNTER — APPOINTMENT (OUTPATIENT)
Dept: GENERAL RADIOLOGY | Facility: OTHER | Age: 66
End: 2021-09-13
Attending: PHYSICIAN ASSISTANT
Payer: MEDICARE

## 2021-09-13 ENCOUNTER — HOSPITAL ENCOUNTER (EMERGENCY)
Facility: OTHER | Age: 66
Discharge: OTHER INSTITUTION | End: 2021-09-14
Attending: PHYSICIAN ASSISTANT | Admitting: PHYSICIAN ASSISTANT
Payer: MEDICARE

## 2021-09-13 DIAGNOSIS — N17.9 ACUTE RENAL FAILURE ON DIALYSIS (H): ICD-10-CM

## 2021-09-13 DIAGNOSIS — R79.89 ELEVATED BRAIN NATRIURETIC PEPTIDE (BNP) LEVEL: ICD-10-CM

## 2021-09-13 DIAGNOSIS — R41.0 CONFUSION ASSOCIATED WITH INFECTION: ICD-10-CM

## 2021-09-13 DIAGNOSIS — B99.9 CONFUSION ASSOCIATED WITH INFECTION: ICD-10-CM

## 2021-09-13 DIAGNOSIS — J18.9 MULTIFOCAL PNEUMONIA: ICD-10-CM

## 2021-09-13 DIAGNOSIS — N18.5 ANEMIA OF CHRONIC RENAL FAILURE, STAGE 5 (H): ICD-10-CM

## 2021-09-13 DIAGNOSIS — Z99.2 ACUTE RENAL FAILURE ON DIALYSIS (H): ICD-10-CM

## 2021-09-13 DIAGNOSIS — D63.1 ANEMIA OF CHRONIC RENAL FAILURE, STAGE 5 (H): ICD-10-CM

## 2021-09-13 DIAGNOSIS — S82.852D CLOSED TRIMALLEOLAR FRACTURE OF LEFT ANKLE WITH ROUTINE HEALING, SUBSEQUENT ENCOUNTER: ICD-10-CM

## 2021-09-13 DIAGNOSIS — I50.33 ACUTE ON CHRONIC DIASTOLIC CONGESTIVE HEART FAILURE (H): ICD-10-CM

## 2021-09-13 DIAGNOSIS — R79.89 ELEVATED TROPONIN: ICD-10-CM

## 2021-09-13 DIAGNOSIS — R09.02 HYPOXIA: ICD-10-CM

## 2021-09-13 LAB
ALBUMIN SERPL-MCNC: 2.8 G/DL (ref 3.5–5.7)
ALP SERPL-CCNC: 143 U/L (ref 34–104)
ALT SERPL W P-5'-P-CCNC: 4 U/L (ref 7–52)
AMMONIA PLAS-SCNC: 21 UMOL/L (ref 16–53)
ANION GAP SERPL CALCULATED.3IONS-SCNC: 12 MMOL/L (ref 3–14)
AST SERPL W P-5'-P-CCNC: 27 U/L (ref 13–39)
BASE EXCESS BLDA CALC-SCNC: 15.3 MMOL/L (ref -9–1.8)
BASOPHILS # BLD MANUAL: 0 10E3/UL (ref 0–0.2)
BASOPHILS NFR BLD MANUAL: 0 %
BILIRUB SERPL-MCNC: 0.3 MG/DL (ref 0.3–1)
BUN SERPL-MCNC: 22 MG/DL (ref 7–25)
CALCIUM SERPL-MCNC: 9.1 MG/DL (ref 8.6–10.3)
CHLORIDE BLD-SCNC: 88 MMOL/L (ref 98–107)
CO2 SERPL-SCNC: 37 MMOL/L (ref 21–31)
CREAT SERPL-MCNC: 3.27 MG/DL (ref 0.6–1.2)
CRP SERPL-MCNC: 339.2 MG/L
EOSINOPHIL # BLD MANUAL: 0.6 10E3/UL (ref 0–0.7)
EOSINOPHIL NFR BLD MANUAL: 2 %
ERYTHROCYTE [DISTWIDTH] IN BLOOD BY AUTOMATED COUNT: 16.4 % (ref 10–15)
GFR SERPL CREATININE-BSD FRML MDRD: 14 ML/MIN/1.73M2
GLUCOSE BLD-MCNC: 107 MG/DL (ref 70–105)
HCO3 BLD-SCNC: 40 MMOL/L (ref 21–28)
HCT VFR BLD AUTO: 23.9 % (ref 35–47)
HGB BLD-MCNC: 7.6 G/DL (ref 11.7–15.7)
LACTATE SERPL-SCNC: 1 MMOL/L (ref 0.7–2)
LYMPHOCYTES # BLD MANUAL: 4.1 10E3/UL (ref 0.8–5.3)
LYMPHOCYTES NFR BLD MANUAL: 13 %
MCH RBC QN AUTO: 30.9 PG (ref 26.5–33)
MCHC RBC AUTO-ENTMCNC: 31.8 G/DL (ref 31.5–36.5)
MCV RBC AUTO: 97 FL (ref 78–100)
METAMYELOCYTES # BLD MANUAL: 1.3 10E3/UL
METAMYELOCYTES NFR BLD MANUAL: 4 %
MONOCYTES # BLD MANUAL: 2.2 10E3/UL (ref 0–1.3)
MONOCYTES NFR BLD MANUAL: 7 %
MYELOCYTES # BLD MANUAL: 0.6 10E3/UL
MYELOCYTES NFR BLD MANUAL: 2 %
NEUTROPHILS # BLD MANUAL: 23 10E3/UL (ref 1.6–8.3)
NEUTROPHILS NFR BLD MANUAL: 72 %
NT-PROBNP SERPL-MCNC: 1360 PG/ML (ref 0–100)
O2/TOTAL GAS SETTING VFR VENT: 4 %
OXYHGB MFR BLD: 94 % (ref 92–100)
PCO2 BLD: 49 MM HG (ref 35–45)
PH BLD: 7.52 [PH] (ref 7.35–7.45)
PLAT MORPH BLD: ABNORMAL
PLATELET # BLD AUTO: 544 10E3/UL (ref 150–450)
PO2 BLD: 70 MM HG (ref 80–105)
POTASSIUM BLD-SCNC: 3.1 MMOL/L (ref 3.5–5.1)
PROT SERPL-MCNC: 6.2 G/DL (ref 6.4–8.9)
RBC # BLD AUTO: 2.46 10E6/UL (ref 3.8–5.2)
RBC MORPH BLD: ABNORMAL
SARS-COV-2 RNA RESP QL NAA+PROBE: NEGATIVE
SMUDGE CELLS BLD QL SMEAR: PRESENT
SODIUM SERPL-SCNC: 137 MMOL/L (ref 134–144)
TROPONIN I SERPL-MCNC: 211.3 PG/ML (ref 0–34)
TROPONIN I SERPL-MCNC: 219.9 PG/ML (ref 0–34)
TROPONIN I SERPL-MCNC: 251.4 PG/ML (ref 0–34)
WBC # BLD AUTO: 31.9 10E3/UL (ref 4–11)

## 2021-09-13 PROCEDURE — 93010 ELECTROCARDIOGRAM REPORT: CPT | Performed by: INTERNAL MEDICINE

## 2021-09-13 PROCEDURE — 86140 C-REACTIVE PROTEIN: CPT | Performed by: PHYSICIAN ASSISTANT

## 2021-09-13 PROCEDURE — 71250 CT THORAX DX C-: CPT | Mod: ME

## 2021-09-13 PROCEDURE — C9803 HOPD COVID-19 SPEC COLLECT: HCPCS | Performed by: PHYSICIAN ASSISTANT

## 2021-09-13 PROCEDURE — 93005 ELECTROCARDIOGRAM TRACING: CPT | Performed by: PHYSICIAN ASSISTANT

## 2021-09-13 PROCEDURE — 96366 THER/PROPH/DIAG IV INF ADDON: CPT | Performed by: PHYSICIAN ASSISTANT

## 2021-09-13 PROCEDURE — 250N000011 HC RX IP 250 OP 636: Performed by: PHYSICIAN ASSISTANT

## 2021-09-13 PROCEDURE — 82140 ASSAY OF AMMONIA: CPT | Performed by: PHYSICIAN ASSISTANT

## 2021-09-13 PROCEDURE — 83880 ASSAY OF NATRIURETIC PEPTIDE: CPT | Performed by: PHYSICIAN ASSISTANT

## 2021-09-13 PROCEDURE — 36416 COLLJ CAPILLARY BLOOD SPEC: CPT | Performed by: PHYSICIAN ASSISTANT

## 2021-09-13 PROCEDURE — 258N000003 HC RX IP 258 OP 636: Performed by: PHYSICIAN ASSISTANT

## 2021-09-13 PROCEDURE — 82805 BLOOD GASES W/O2 SATURATION: CPT | Performed by: PHYSICIAN ASSISTANT

## 2021-09-13 PROCEDURE — 83605 ASSAY OF LACTIC ACID: CPT | Performed by: PHYSICIAN ASSISTANT

## 2021-09-13 PROCEDURE — 73610 X-RAY EXAM OF ANKLE: CPT | Mod: LT

## 2021-09-13 PROCEDURE — 80053 COMPREHEN METABOLIC PANEL: CPT | Performed by: PHYSICIAN ASSISTANT

## 2021-09-13 PROCEDURE — 96365 THER/PROPH/DIAG IV INF INIT: CPT | Performed by: PHYSICIAN ASSISTANT

## 2021-09-13 PROCEDURE — 84484 ASSAY OF TROPONIN QUANT: CPT | Performed by: PHYSICIAN ASSISTANT

## 2021-09-13 PROCEDURE — 85018 HEMOGLOBIN: CPT | Performed by: PHYSICIAN ASSISTANT

## 2021-09-13 PROCEDURE — 99285 EMERGENCY DEPT VISIT HI MDM: CPT | Performed by: PHYSICIAN ASSISTANT

## 2021-09-13 PROCEDURE — U0003 INFECTIOUS AGENT DETECTION BY NUCLEIC ACID (DNA OR RNA); SEVERE ACUTE RESPIRATORY SYNDROME CORONAVIRUS 2 (SARS-COV-2) (CORONAVIRUS DISEASE [COVID-19]), AMPLIFIED PROBE TECHNIQUE, MAKING USE OF HIGH THROUGHPUT TECHNOLOGIES AS DESCRIBED BY CMS-2020-01-R: HCPCS | Performed by: PHYSICIAN ASSISTANT

## 2021-09-13 PROCEDURE — 36600 WITHDRAWAL OF ARTERIAL BLOOD: CPT | Performed by: PHYSICIAN ASSISTANT

## 2021-09-13 PROCEDURE — 70450 CT HEAD/BRAIN W/O DYE: CPT | Mod: ME

## 2021-09-13 PROCEDURE — 87040 BLOOD CULTURE FOR BACTERIA: CPT | Performed by: PHYSICIAN ASSISTANT

## 2021-09-13 PROCEDURE — 99285 EMERGENCY DEPT VISIT HI MDM: CPT | Mod: 25 | Performed by: PHYSICIAN ASSISTANT

## 2021-09-13 PROCEDURE — 96367 TX/PROPH/DG ADDL SEQ IV INF: CPT | Performed by: PHYSICIAN ASSISTANT

## 2021-09-13 RX ORDER — MEROPENEM 500 MG/1
500 INJECTION, POWDER, FOR SOLUTION INTRAVENOUS ONCE
Status: DISCONTINUED | OUTPATIENT
Start: 2021-09-13 | End: 2021-09-13

## 2021-09-13 RX ORDER — CEFAZOLIN SODIUM 1 G/50ML
2000 SOLUTION INTRAVENOUS ONCE
Status: COMPLETED | OUTPATIENT
Start: 2021-09-13 | End: 2021-09-13

## 2021-09-13 RX ORDER — SODIUM CHLORIDE 9 MG/ML
INJECTION, SOLUTION INTRAVENOUS CONTINUOUS
Status: DISCONTINUED | OUTPATIENT
Start: 2021-09-13 | End: 2021-09-14

## 2021-09-13 RX ORDER — MEROPENEM 500 MG/1
500 INJECTION, POWDER, FOR SOLUTION INTRAVENOUS ONCE
Status: COMPLETED | OUTPATIENT
Start: 2021-09-13 | End: 2021-09-13

## 2021-09-13 RX ADMIN — SODIUM CHLORIDE 1000 ML: 9 INJECTION, SOLUTION INTRAVENOUS at 11:53

## 2021-09-13 RX ADMIN — VANCOMYCIN HYDROCHLORIDE 2000 MG: 1 INJECTION, POWDER, LYOPHILIZED, FOR SOLUTION INTRAVENOUS at 13:30

## 2021-09-13 RX ADMIN — MEROPENEM 500 MG: 500 INJECTION, POWDER, FOR SOLUTION INTRAVENOUS at 15:52

## 2021-09-13 ASSESSMENT — ENCOUNTER SYMPTOMS
CHEST TIGHTNESS: 0
ABDOMINAL PAIN: 0
BACK PAIN: 0
SHORTNESS OF BREATH: 0
HEMATURIA: 0
WOUND: 0
ADENOPATHY: 0
CONFUSION: 1
CHILLS: 0
FEVER: 0
BRUISES/BLEEDS EASILY: 0

## 2021-09-13 NOTE — PHARMACY-VANCOMYCIN DOSING SERVICE
Pharmacy Consult- Vancomycin Assessment    Lucila Willett is a 66 year old female admitted on 2021.    Vancomycin has been ordered per MD, for the indication of: sepsis/community acquired pneumonia    Current Antibiotic Regimen Includes: meropenem + vanco    Patient Active Problem List   Diagnosis     Anemia     Atherosclerosis of coronary artery     Cellulitis and abscess of leg, except foot     Cellulitis of left lower extremity     Chronic kidney disease, stage IV (severe) (H)     Chronic pain disorder     Diabetes mellitus, type II (H)     Diastolic heart failure (H)     Essential hypertension     Foot ulcer (H)     Gout     Hypertensive heart failure with end stage renal disease (H)     Hypokalemia     Hyponatremia     Hypothyroidism     Leukocytosis     Morbid obesity (H)     Severe sepsis (H)     Sleep apnea     Type 2 diabetes mellitus (H)     Cellulitis       Allergies (and reaction): Zosyn, Cetirizine, Pregabalin, Latex, Rosiglitazone, and Zosyn [piperacillin-tazobactam in d5w]    Most recent flowsheet Weight: 138.8 kg (306 lb)  Most recent flowsheet      No intake or output data in the 24 hours ending 21 1229    Tmax = Temp (24hrs), Av.9  F (36.1  C), Min:96.9  F (36.1  C), Max:96.9  F (36.1  C)      Recent Labs   Lab Test 21  1113   WBC 31.9*       Recent Labs   Lab Test 21  1113 21  1130 21  1622   BUN 22 25 19   CR 3.27* 3.77* 3.60*       estimated creatinine clearance is 23.3 mL/min (A) (based on SCr of 3.27 mg/dL (H)).    Cultures Pending: blood x2    Culture Results: pending    Plan: vancomycin 2 g dose once per sepsis/CAP    Regimen Start Date: 21  Recommended Dose: 2000 mg, which provides 14.4 mg/kg/dose  Interval: Once      Thank You for the consult. Will continue to follow.    Elza Simon RPH ....................  2021   12:29 PM

## 2021-09-13 NOTE — ED TRIAGE NOTES
ED Nursing Triage Note (General)   ________________________________    Lucila Willett is a 66 year old Female that presents to triage via EMS with complaints of altered mental status while in the middle of a dialysis run.  Per EMS patient became lethargic and was not responding correctly.  EMS states they sternal rubbed patient prior to getting a response.  EMS states that after this, patient is now fully responsive, A&Ox4, no complaints. Patient has an ORIF which patient states was done 2-3 weeks ago.    Significant symptoms had onset 30 minutes ago.  Patient appears alert behavior.  GCS-15  Airway: intact  Breathing noted as Normal  Action taken: 3      PRE HOSPITAL PRIOR LIVING SITUATION-[facility]

## 2021-09-13 NOTE — ED PROVIDER NOTES
History     Chief Complaint   Patient presents with     Altered Mental Status     HPI  Lucila Willett is a 66 year old female who resides at [facility].  She recently had a left ankle ORIF as well as external fixation to a trimalleolar fracture on 8/13/2021.  This was repaired by Dr. Lua at [facility].  She has been on Plavix as a precaution.  She continues to be nonweightbearing.  She has CKD requiring dialysis.  She normally undergoes dialysis Monday Wednesday and Friday.  She was at dialysis today and was about [facility] through her dialysis run with staff there noted she seemed much more lethargic and out of it than usual.  They called EMS and upon EMS arrival in the patient receiving external rub she has been alert and oriented x4 since then.  She now appears to be fully responsive.  However they noted her to be somewhat hypoxic.  She is unclear what happened and does not remember the events.  Denies any fever or chills.  No nausea or vomiting.  No lightheadedness or dizziness.  No shortness of breath or chest pain.  No sore throat, cough, or flulike symptoms.  She received her Moderna Covid vaccination series in February.    Allergies:  Allergies   Allergen Reactions     Zosyn Hives and Rash     Given IV benadryl, and Pepcid in outside facility.      Cetirizine      Other reaction(s): *Unknown - Pt Doesn't Remember     Pregabalin      Other reaction(s): Other - Describe In Comment Field  Floaters in eyes.      Latex Rash     Patient states she gets a rash     Rosiglitazone Rash     Patient states she gets a rash     Zosyn [Piperacillin-Tazobactam In D5w] Rash       Problem List:    Patient Active Problem List    Diagnosis Date Noted     Cellulitis 08/21/2020     Priority: Medium     Leukocytosis 12/19/2016     Priority: Medium     Hyponatremia 06/10/2016     Priority: Medium     Cellulitis of left lower extremity 06/09/2016     Priority: Medium     Severe sepsis (H) 06/09/2016     Priority: Medium      Foot ulcer (H) 01/11/2016     Priority: Medium     Diastolic heart failure (H) 07/01/2015     Priority: Medium     Chronic pain disorder 06/30/2015     Priority: Medium     Overview:   Overview:   [facility] Agreement for Opioid Treatment  PHYSICIAN:  Gauri Grimm MD  PHARMACY:  [facility] in [place], [place]  PHARMACY PHONE:  [phone]    Last two urine drug screens have been negative - on 1/7/13 it had been two days since she took lortab.  Negative for norco aug 2015       Type 2 diabetes mellitus (H) 06/11/2013     Priority: Medium     Overview:   Overview:   LDL 94 - on crestor  Off valsartan because of worsening renal function ? Hypotension (10/2014: will retry low dose lisinopril)  On ASA  Right diabetic retinopathy on right 2/2014       Hypokalemia 09/13/2012     Priority: Medium     Anemia 09/12/2012     Priority: Medium     Cellulitis and abscess of leg, except foot 09/12/2012     Priority: Medium     Diabetes mellitus, type II (H) 09/12/2012     Priority: Medium     Overview:   a system change updated this record. This will not affect patient care or billing. This comment can be deleted.       Hypertensive heart failure with end stage renal disease (H) 09/12/2012     Priority: Medium     Morbid obesity (H) 09/12/2012     Priority: Medium     Chronic kidney disease, stage IV (severe) (H) 03/23/2012     Priority: Medium     Overview:   Overview:   IMO Update 10/11       Atherosclerosis of coronary artery 03/17/2010     Priority: Medium     Overview:   Overview:   IMO Update       Gout 02/29/2008     Priority: Medium     Essential hypertension 02/27/2008     Priority: Medium     Hypothyroidism 02/27/2008     Priority: Medium     Overview:   Overview:   IMO Update 10/11       Sleep apnea 02/27/2008     Priority: Medium     Overview:   Overview:   10/2012: cpap did not work - trying BIPAP          Past Medical History:    Past Medical History:   Diagnosis Date     Atherosclerotic heart disease of native  coronary artery without angina pectoris      Cellulitis      Chronic kidney disease      Diastolic congestive heart failure (H)      Hypothyroidism      Major depressive disorder, single episode      Obesity      Personal history of other medical treatment (CODE)      Sleep apnea      Type 2 diabetes mellitus without complications (H)        Past Surgical History:    Past Surgical History:   Procedure Laterality Date     CHOLECYSTECTOMY      No Comments Provided     OTHER SURGICAL HISTORY      [id number],OTHER     OTHER SURGICAL HISTORY      09916.0,UT SPINE FUSION ANTER 3 SGMTS     OTHER SURGICAL HISTORY      LOC-1006.04,CABG     OTHER SURGICAL HISTORY      859754,ENDOSCOPY GI       Family History:    No family history on file.    Social History:  Marital Status:   [4]  Social History     Tobacco Use     Smoking status: Former Smoker     Packs/day: 2.00     Years: 20.00     Pack years: 40.00     Types: Cigarettes     Quit date: 1997     Years since quittin.0     Smokeless tobacco: Never Used   Substance Use Topics     Alcohol use: Yes     Alcohol/week: 0.0 standard drinks     Comment: Alcoholic Drinks/day: Occasionally     Drug use: Unknown     Types: Other     Comment: Drug use: No        Medications:    acetaminophen (TYLENOL) 500 MG tablet  allopurinol (ZYLOPRIM) 100 MG tablet  alum & mag hydroxide-simethicone (MAALOX) 200-200-20 MG/5ML SUSP suspension  aspirin (GOODSENSE ASPIRIN) 325 MG tablet  atorvastatin (LIPITOR) 80 MG tablet  B Complex-C-Folic Acid (DIALYVITE 800 PO)  blood glucose (NO BRAND SPECIFIED) lancets standard  blood glucose monitoring (NO BRAND SPECIFIED) test strip  buPROPion (WELLBUTRIN XL) 150 MG 24 hr tablet  Cholecalciferol (D 5000) 5000 UNITS TABS  clotrimazole (LOTRIMIN) 1 % cream  fexofenadine (ALLEGRA) 60 MG tablet  FLUoxetine (PROZAC) 20 MG capsule  fluticasone (FLONASE) 50 MCG/ACT spray  gabapentin (NEURONTIN) 100 MG capsule  guaiFENesin (ROBITUSSIN) 20 mg/mL SOLN  "solution  hypromellose (GENTEAL) 0.3 % SOLN ophthalmic solution  insulin aspart (NOVOLOG PEN) 100 UNIT/ML injection  insulin aspart (NOVOLOG PEN) 100 UNIT/ML pen  insulin detemir (LEVEMIR PEN) 100 UNIT/ML pen  ipratropium - albuterol 0.5 mg/2.5 mg/3 mL (DUONEB) 0.5-2.5 (3) MG/3ML neb solution  levothyroxine (SYNTHROID/LEVOTHROID) 200 MCG tablet  levothyroxine (SYNTHROID/LEVOTHROID) 25 MCG tablet  Lidocaine (LIDOCARE) 4 % Patch  lidocaine-prilocaine (EMLA) 2.5-2.5 % external cream  magnesium hydroxide (MILK OF MAGNESIA) 400 MG/5ML suspension  metoprolol tartrate (LOPRESSOR) 25 MG tablet  montelukast (SINGULAIR) 10 MG tablet  Needle, Disp, 31G X 5/16\" MISC  nitroGLYcerin (NITROSTAT) 0.4 MG sublingual tablet  nystatin (MYCOSTATIN) 388870 UNIT/GM external powder  ondansetron (ZOFRAN-ODT) 4 MG ODT tab  oxyCODONE IR (ROXICODONE) 5 MG tablet  senna-docusate (SENOKOT-S/PERICOLACE) 8.6-50 MG tablet  sevelamer carbonate (RENVELA) 800 MG tablet  traMADol (ULTRAM) 50 MG tablet          Review of Systems   Constitutional: Negative for chills and fever.   HENT: Negative for congestion.    Eyes: Negative for visual disturbance.   Respiratory: Negative for chest tightness and shortness of breath.    Cardiovascular: Negative for chest pain.   Gastrointestinal: Negative for abdominal pain.   Genitourinary: Negative for hematuria.   Musculoskeletal: Negative for back pain.   Skin: Negative for rash and wound.   Neurological: Positive for syncope.   Hematological: Negative for adenopathy. Does not bruise/bleed easily.   Psychiatric/Behavioral: Positive for confusion.   All other systems reviewed and are negative.      Physical Exam   BP: (!) 87/49  Pulse: 83  Temp: 96.9  F (36.1  C)  Resp: 18  Weight: 138.8 kg (306 lb)  SpO2: 94 %    Vitals:    09/13/21 1500 09/13/21 1515 09/13/21 1530 09/13/21 1545   BP: 132/57 125/70 139/54 127/49   Pulse: 74 74 74 75   Resp: 24 20 29 26   Temp:       TempSrc:       SpO2:       Weight:     "       Physical Exam  Vitals and nursing note reviewed.   Constitutional:       General: She is not in acute distress.     Appearance: Normal appearance. She is not ill-appearing or toxic-appearing.   HENT:      Head: Normocephalic. No raccoon eyes, right periorbital erythema or left periorbital erythema.      Right Ear: No drainage or tenderness.      Left Ear: No drainage or tenderness.      Nose: Nose normal.   Eyes:      General: Lids are normal. Gaze aligned appropriately. No scleral icterus.     Extraocular Movements: Extraocular movements intact.   Neck:      Trachea: No tracheal deviation.   Cardiovascular:      Rate and Rhythm: Normal rate.   Pulmonary:      Effort: Pulmonary effort is normal. No respiratory distress.      Breath sounds: No stridor. No wheezing.      Comments: Lung sounds are decreased throughout.  SaO2 was 87% on room air upon arrival.  This jumped up to 94% on 4 L via nasal cannula.  She does not appear to be in respiratory distress.  No tachypnea.  She talks in full sentences.  Abdominal:      Tenderness: There is no abdominal tenderness.   Musculoskeletal:         General: No deformity or signs of injury. Normal range of motion.      Cervical back: Normal range of motion. No signs of trauma.   Skin:     General: Skin is warm and dry.      Coloration: Skin is not jaundiced or pale.   Neurological:      General: No focal deficit present.      Mental Status: She is alert and oriented to person, place, and time.      GCS: GCS eye subscore is 4. GCS verbal subscore is 4. GCS motor subscore is 6.      Motor: No tremor or seizure activity.   Psychiatric:         Attention and Perception: Attention normal.         Mood and Affect: Mood normal.         ED Course     EKG shows normal sinus rhythm.  ST and T wave abnormality, consider anterior lateral ischemia.  Heart rate is 80.  Results for orders placed or performed during the hospital encounter of 09/13/21 (from the past 24 hour(s))   CBC with  platelets differential    Narrative    The following orders were created for panel order CBC with platelets differential.  Procedure                               Abnormality         Status                     ---------                               -----------         ------                     CBC with platelets and d...[[id number]]  Abnormal            Final result               Manual Differential[[id number]]          Abnormal            Final result                 Please view results for these tests on the individual orders.   Comprehensive metabolic panel   Result Value Ref Range    Sodium 137 134 - 144 mmol/L    Potassium 3.1 (L) 3.5 - 5.1 mmol/L    Chloride 88 (L) 98 - 107 mmol/L    Carbon Dioxide (CO2) 37 (H) 21 - 31 mmol/L    Anion Gap 12 3 - 14 mmol/L    Urea Nitrogen 22 7 - 25 mg/dL    Creatinine 3.27 (H) 0.60 - 1.20 mg/dL    Calcium 9.1 8.6 - 10.3 mg/dL    Glucose 107 (H) 70 - 105 mg/dL    Alkaline Phosphatase 143 (H) 34 - 104 U/L    AST 27 13 - 39 U/L    ALT 4 (L) 7 - 52 U/L    Protein Total 6.2 (L) 6.4 - 8.9 g/dL    Albumin 2.8 (L) 3.5 - 5.7 g/dL    Bilirubin Total 0.3 0.3 - 1.0 mg/dL    GFR Estimate 14 (L) >60 mL/min/1.73m2   Troponin I   Result Value Ref Range    Troponin I 251.4 (H) 0.0 - 34.0 pg/mL   Lactic acid whole blood   Result Value Ref Range    Lactic Acid 1.0 0.7 - 2.0 mmol/L   Ammonia   Result Value Ref Range    Ammonia 21 16 - 53 umol/L   Blood gas arterial and oxyhgb   Result Value Ref Range    pH Arterial 7.52 (H) 7.35 - 7.45    pCO2 Arterial 49 (H) 35 - 45 mm Hg    pO2 Arterial 70 (L) 80 - 105 mm Hg    Bicarbonate Arterial 40 (H) 21 - 28 mmol/L    Oxyhemoglobin Arterial 94 92 - 100 %    Base Excess/Deficit (+/-) 15.3 (H) -9.0 - 1.8 mmol/L    FIO2 4    CRP inflammation   Result Value Ref Range    CRP Inflammation 339.2 (H) <10.0 mg/L   Nt probnp inpatient (BNP)   Result Value Ref Range    N terminal Pro BNP Inpatient 1,360 (H) 0 - 100 pg/mL   CBC with platelets and differential   Result  Value Ref Range    WBC Count 31.9 (H) 4.0 - 11.0 10e3/uL    RBC Count 2.46 (L) 3.80 - 5.20 10e6/uL    Hemoglobin 7.6 (L) 11.7 - 15.7 g/dL    Hematocrit 23.9 (L) 35.0 - 47.0 %    MCV 97 78 - 100 fL    MCH 30.9 26.5 - 33.0 pg    MCHC 31.8 31.5 - 36.5 g/dL    RDW 16.4 (H) 10.0 - 15.0 %    Platelet Count 544 (H) 150 - 450 10e3/uL   Manual Differential   Result Value Ref Range    % Neutrophils 72 %    % Lymphocytes 13 %    % Monocytes 7 %    % Eosinophils 2 %    % Basophils 0 %    % Metamyelocytes 4 %    % Myelocytes 2 %    Absolute Neutrophils 23.0 (H) 1.6 - 8.3 10e3/uL    Absolute Lymphocytes 4.1 0.8 - 5.3 10e3/uL    Absolute Monocytes 2.2 (H) 0.0 - 1.3 10e3/uL    Absolute Eosinophils 0.6 0.0 - 0.7 10e3/uL    Absolute Basophils 0.0 0.0 - 0.2 10e3/uL    Absolute Metamyelocytes 1.3 (H) <=0.0 10e3/uL    Absolute Myelocytes 0.6 (H) <=0.0 10e3/uL    RBC Morphology Confirmed RBC Indices     Platelet Assessment  Automated Count Confirmed. Platelet morphology is normal.     Automated Count Confirmed. Platelet morphology is normal.    Smudge Cells Present (A) None Seen   CT Head w/o Contrast    Narrative    PROCEDURE: CT HEAD W/O CONTRAST     HISTORY: Mental status change, unknown cause.    COMPARISON: None.    TECHNIQUE:  Helical images of the head from the foramen magnum to the  vertex were obtained without contrast.    FINDINGS: The ventricles and sulci are normal in volume. No acute  intracranial hemorrhage, mass effect, midline shift, hydrocephalus or  basilar cystern effacement are present.    There is white matter low density in both hemispheres consistent with  small vessel changes.    The calvarium is intact. The mastoid air cells are clear.  The  visualized paranasal sinuses are clear.      Impression    IMPRESSION: No acute brain abnormality.      JOSÉ MIGUEL MARIE MD         SYSTEM ID:  [id number]   Asymptomatic COVID-19 Virus (Coronavirus) by PCR Nose    Specimen: Nose; Swab   Result Value Ref Range    SARS CoV2 PCR  Negative Negative    Narrative    Testing was performed using the Xpert Xpress SARS-CoV-2 Assay on the   [employer]. Additional information about   this Emergency Use Authorization (EUA) assay can be found via the Lab   Guide. This test should be ordered for the detection of SARS-CoV-2 in   individuals who meet SARS-CoV-2 clinical and/or epidemiological   criteria. Test performance is unknown in asymptomatic patients. This   test is for in vitro diagnostic use under the FDA EUA for   laboratories certified under CLIA to perform high complexity testing.   This test has not been FDA cleared or approved. A negative result   does not rule out the presence of PCR inhibitors in the specimen or   target RNA in concentration below the limit of detection for the   assay. The possibility of a false negative should be considered if   the patient's recent exposure or clinical presentation suggests   COVID-19. This test was validated by [facility] and [facility]. This laboratory is certified under the Clinic  al Laboratory Improvement Amendments (CLIA) as qualified to perform high complex  ity clinical laboratory testing.   CT Chest w/o Contrast    Narrative    CT CHEST W/O CONTRAST  9/13/2021 1:22 PM    CLINICAL HISTORY: Female, age 66 years,  Dyspnea, chronic, unclear  etiology;    Comparison:  CT scan abdomen and pelvis 8/7/2021    TECHNIQUE:  CT was performed of the chest  without contrast.   Sagittal, coronal, axial and MIP reconstructions were reviewed.     FINDINGS:  Chest CT:    Lungs demonstrate patchy areas of consolidation, most severe in the  right upper lobe. Additionally, there are patchy areas of air trapping  seen throughout both lungs. Small bilateral pleural effusions are  present, larger on the right.    Dense calcifications are seen within the coronary arteries.  Postoperative changes seen consistent with coronary artery bypass  graft.    There  is a 4.0 x 3.4 cm multilobulated cystic structure seen along the  midline and para midline aspects of the thyroid bed. 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    Moderate volume of debris is seen within the esophagus.    Visualized portions of the upper abdomen demonstrate dense  calcifications within the arterial structures. There is also dense  calcification seen near the head of the pancreas, also unchanged.      Oral contrast is seen within the visualized portions of the colon.      Impression    IMPRESSION:   Multifocal pneumonia of both lungs, most severe in the right upper  lobe.    Small bilateral pleural effusions, larger on the right.    Reactive mediastinal and hilar lymphadenopathy.     4 cm cystic structure along the thyroid bed may represent  postoperative hematoma/seroma or may perhaps represent a large thyroid  cyst/cysts in the setting of a hypoplastic thyroid gland. Thyroid  ultrasound would be helpful in better characterizing.    KEESHA FLOWER MD         SYSTEM ID:  [id number]   XR Ankle Left G/E 3 Views    Narrative    Exam: XR ANKLE LEFT G/E 3 VIEWS     History:Female, age 66 years, 1 mo S/p ORIF    Comparison:  Left ankle x-rays 8/13/2021    Technique: Three views are submitted.    Findings: Bones are osteopenic. Trimalleolar fracture deformity again  seen within the left ankle. The bones appear to demonstrate near  normal anatomic alignment. External fixation device appreciated with 2  large pins traversing the tibiotalar joint. Other pins at. 2 the  positioned in the proximal portions of the first and fourth  metatarsals. No evidence of dislocation.           Impression    Impression:  Generalized osteopenia, complex fracture deformity and extensive  postoperative changes without distinct evidence of acute abnormality.  Comparison to immediate post operative imaging may be helpful in  assessing the alignment of the bony structures.    Findings consistent with prior open reduction internal  fixation of  medial and lateral malleolus fractures with subsequent removal of the  metallic hardware.    KEESHA FLOWER MD         SYSTEM ID:  [id number]   Troponin I (now)   Result Value Ref Range    Troponin I 219.9 (H) 0.0 - 34.0 pg/mL       Medications   0.9% sodium chloride BOLUS (0 mLs Intravenous Stopped 9/13/21 1355)     Followed by   sodium chloride 0.9% infusion (has no administration in time range)   meropenem (MERREM) 500 mg vial to attach to  mL bag for ADULTS or 25 mL bag for PEDS (0 mg Intravenous Stopped 9/13/21 1718)   vancomycin (VANCOCIN) 2,000 mg in sodium chloride 0.9 % 500 mL intermittent infusion (0 mg Intravenous Stopped 9/13/21 1552)       Assessments & Plan (with Medical Decision Making)     I have reviewed the nursing notes.    I have reviewed the findings, diagnosis, plan and need for follow up with the patient.      New Prescriptions    No medications on file       Final diagnoses:   Hypoxia   Multifocal pneumonia   Confusion associated with infection   Elevated troponin   Acute on chronic diastolic congestive heart failure (H)   Elevated brain natriuretic peptide (BNP) level   Acute renal failure on dialysis (H)   Closed trimalleolar fracture of left ankle with routine healing, subsequent encounter   Anemia of chronic renal failure, stage 5 (H)   Afebrile.  Vital signs show decreased blood pressure initially at 87/49.  SaO2 is 87% on room air.  This jumped up to 94% on 4 L via nasal cannula.  She is not appear to be in any respiratory distress.  Apparent increase in lethargy and confusion today while correction through dialysis treatment.  IV established and she was given fluids initially.  EKG shows normal sinus rhythm.  ST and T wave abnormality, consider anterior lateral ischemia.  Heart rate is 80.  Initial troponin is 251.4.  She denies chest pain.  BNP is 1360.  No previous for comparison.  ABG shows a pH of 7.52, PCO2 is 49, PO2 is 70, HCO3 is 40, base excess is 15.3 and SaO2  is 94% on an FiO2 of 0.36.   CBC shows significantly elevated white blood cells at 31.9 with a left shift.  Platelets are 544.  The patient's hemoglobin is 7.6.  This is a significant change from 2 weeks ago when it was 9.4.  She denies any GI losses anemia of chronic kidney disease stage V.  Most likely due to being fluid overloaded.  CMP shows potassium is 3.1, creatinine is 3.27 with a BUN of 22 and a GFR of 14.  Blood cultures are pending.  Lactic acid is normal.  Ammonia is normal.  Head CT is unremarkable with no acute findings.  CT of her chest without contrast shows Multifocal pneumonia of both lungs, most severe in the right upper lobe.  Small bilateral pleural effusions, larger on the right.  Reactive mediastinal and hilar lymphadenopathy.  A 4 cm cystic structure along the thyroid bed may represent postoperative hematoma/seroma or may perhaps represent a large thyroid cyst/cysts in the setting of a hypoplastic thyroid gland.  Given her allergy to Zosyn and kidney function discussed antibiotic treatment with pharmacy the patient was started on vancomycin as well as meropenem.  Attempted placement at [facility] in [place] but they are currently on divert.  Attempted placement at [facility] and no availability.  Call to [facility] was placed the report no observable openings in [place], [place].  I even called [facility] and they are full as well.  We will continue to make attempts for placement at this time.  Her Covid is negative.  Continue to await placement at this time however my shift is over therefore patient care will be turned over to Dr. Jose at this time.      9/13/2021   [facility] CampusEthan redding PA-C  09/13/21 3941    Addendum: No issues overnight.  We made additional phone calls overnight in addition to marks calls that he references above.  Still no bed availability at [facility] or [facility] which would be the closest dialysis  centers.  A bed did become available at [facility] in [place] and we talked to them about transfer but they declined due to her dialysis status.  I will order a BMP for this morning to keep an eye on her potassium and creatinine.  Ordered her insulin and metoprolol and will consult pharmacy at 8:00 this morning for full med rec.discussed with RN.  LATISHA Garcia will likely take over on patient again during dayshift.  Signed out to Dr. Napier for the brief interval before Ethan arrives.  Tong Tsai MD  09/14/21 0532       Tong Tsai MD  09/14/21 0537      11:11 AM  Discussed this case with Diego and talked with Dr. Carr.  He has accepted this patient sepsis, respiratory failure and end-stage kidney disease.  She will be transported via ground ambulance at this time.     Ethan Mejia PA-C  09/14/21 1114

## 2021-09-14 ENCOUNTER — TRANSFERRED RECORDS (OUTPATIENT)
Dept: HEALTH INFORMATION MANAGEMENT | Facility: OTHER | Age: 66
End: 2021-09-14

## 2021-09-14 VITALS
HEART RATE: 80 BPM | DIASTOLIC BLOOD PRESSURE: 56 MMHG | OXYGEN SATURATION: 96 % | RESPIRATION RATE: 10 BRPM | TEMPERATURE: 96.5 F | SYSTOLIC BLOOD PRESSURE: 122 MMHG | WEIGHT: 293 LBS | BODY MASS INDEX: 52.52 KG/M2

## 2021-09-14 LAB
ALT SERPL-CCNC: 6 IU/L (ref 6–31)
ANION GAP SERPL CALCULATED.3IONS-SCNC: 18 MMOL/L (ref 3–14)
AST SERPL-CCNC: 32 IU/L (ref 10–40)
ATRIAL RATE - MUSE: 80 BPM
BUN SERPL-MCNC: 29 MG/DL (ref 7–25)
CALCIUM SERPL-MCNC: 8.9 MG/DL (ref 8.6–10.3)
CHLORIDE BLD-SCNC: 88 MMOL/L (ref 98–107)
CO2 SERPL-SCNC: 30 MMOL/L (ref 21–31)
CREAT SERPL-MCNC: 4.28 MG/DL (ref 0.6–1.2)
DIASTOLIC BLOOD PRESSURE - MUSE: NORMAL MMHG
GFR SERPL CREATININE-BSD FRML MDRD: 10 ML/MIN/1.73M2
GLUCOSE BLD-MCNC: 121 MG/DL (ref 70–105)
HBA1C MFR BLD: 5.6 % (ref 4–6.2)
INR (EXTERNAL): 2.2 (ref 0.9–1.1)
INTERPRETATION ECG - MUSE: NORMAL
P AXIS - MUSE: 57 DEGREES
POTASSIUM BLD-SCNC: 3.7 MMOL/L (ref 3.5–5.1)
PR INTERVAL - MUSE: 164 MS
QRS DURATION - MUSE: 106 MS
QT - MUSE: 354 MS
QTC - MUSE: 408 MS
R AXIS - MUSE: -8 DEGREES
SODIUM SERPL-SCNC: 136 MMOL/L (ref 134–144)
SYSTOLIC BLOOD PRESSURE - MUSE: NORMAL MMHG
T AXIS - MUSE: 242 DEGREES
VENTRICULAR RATE- MUSE: 80 BPM

## 2021-09-14 PROCEDURE — 80048 BASIC METABOLIC PNL TOTAL CA: CPT | Performed by: FAMILY MEDICINE

## 2021-09-14 PROCEDURE — 250N000013 HC RX MED GY IP 250 OP 250 PS 637: Performed by: FAMILY MEDICINE

## 2021-09-14 PROCEDURE — 83036 HEMOGLOBIN GLYCOSYLATED A1C: CPT | Performed by: PHYSICIAN ASSISTANT

## 2021-09-14 PROCEDURE — 96372 THER/PROPH/DIAG INJ SC/IM: CPT | Performed by: PHYSICIAN ASSISTANT

## 2021-09-14 PROCEDURE — 250N000012 HC RX MED GY IP 250 OP 636 PS 637: Performed by: PHYSICIAN ASSISTANT

## 2021-09-14 PROCEDURE — 36415 COLL VENOUS BLD VENIPUNCTURE: CPT | Performed by: FAMILY MEDICINE

## 2021-09-14 RX ORDER — METOPROLOL TARTRATE 25 MG/1
25 TABLET, FILM COATED ORAL 2 TIMES DAILY
Status: DISCONTINUED | OUTPATIENT
Start: 2021-09-14 | End: 2021-09-14 | Stop reason: HOSPADM

## 2021-09-14 RX ORDER — SENNOSIDES 8.6 MG
650 CAPSULE ORAL 4 TIMES DAILY
COMMUNITY
Start: 2021-08-19 | End: 2021-09-18

## 2021-09-14 RX ORDER — CALCIUM CARBONATE 500 MG/1
1 TABLET, CHEWABLE ORAL 3 TIMES DAILY
COMMUNITY
Start: 2021-08-19 | End: 2021-09-18

## 2021-09-14 RX ORDER — POLYETHYLENE GLYCOL 3350 17 G/17G
17 POWDER, FOR SOLUTION ORAL DAILY
COMMUNITY

## 2021-09-14 RX ORDER — CHOLECALCIFEROL (VITAMIN D3) 50 MCG
1 TABLET ORAL DAILY
COMMUNITY

## 2021-09-14 RX ORDER — INSULIN GLARGINE 100 [IU]/ML
27 INJECTION, SOLUTION SUBCUTANEOUS EVERY 12 HOURS
Status: DISCONTINUED | OUTPATIENT
Start: 2021-09-14 | End: 2021-09-14 | Stop reason: DRUGHIGH

## 2021-09-14 RX ORDER — MIDODRINE HYDROCHLORIDE 10 MG/1
TABLET ORAL
COMMUNITY

## 2021-09-14 RX ORDER — MULTIVIT-MIN/IRON/FOLIC ACID/K 18-600-40
1 CAPSULE ORAL 2 TIMES DAILY
COMMUNITY
Start: 2021-08-19 | End: 2021-09-18

## 2021-09-14 RX ORDER — MICONAZOLE NITRATE 2 %
CREAM WITH APPLICATOR VAGINAL
COMMUNITY

## 2021-09-14 RX ORDER — LORATADINE 10 MG/1
5 TABLET ORAL
COMMUNITY

## 2021-09-14 RX ORDER — IBUPROFEN 200 MG
1 TABLET ORAL
COMMUNITY

## 2021-09-14 RX ORDER — CLOPIDOGREL BISULFATE 75 MG/1
75 TABLET ORAL DAILY
COMMUNITY

## 2021-09-14 RX ORDER — DEXTROSE MONOHYDRATE 25 G/50ML
25-50 INJECTION, SOLUTION INTRAVENOUS
Status: DISCONTINUED | OUTPATIENT
Start: 2021-09-14 | End: 2021-09-14 | Stop reason: HOSPADM

## 2021-09-14 RX ORDER — MIDODRINE HYDROCHLORIDE 10 MG/1
10 TABLET ORAL 3 TIMES DAILY
COMMUNITY

## 2021-09-14 RX ORDER — NICOTINE POLACRILEX 4 MG
15-30 LOZENGE BUCCAL
Status: DISCONTINUED | OUTPATIENT
Start: 2021-09-14 | End: 2021-09-14 | Stop reason: HOSPADM

## 2021-09-14 RX ORDER — FERROUS SULFATE 325(65) MG
325 TABLET, DELAYED RELEASE (ENTERIC COATED) ORAL 2 TIMES DAILY
COMMUNITY
Start: 2021-08-19 | End: 2021-09-18

## 2021-09-14 RX ADMIN — INSULIN DETEMIR 5 UNITS: 100 INJECTION, SOLUTION SUBCUTANEOUS at 12:00

## 2021-09-14 RX ADMIN — METOPROLOL TARTRATE 25 MG: 25 TABLET, FILM COATED ORAL at 08:08

## 2021-09-14 NOTE — ED NOTES
Blood glucose 130, primary RN notified.   Breakfast tray ordered: 1/2 [employer] Cheese Omlet and Hot cocoa.

## 2021-09-14 NOTE — PHARMACY-ADMISSION MEDICATION HISTORY
"Pharmacy -- Admission Medication Reconciliation    Prior to admission (PTA) medications were reviewed and the patient's PTA medication list was updated.    Sources Consulted: Sure scripts, Care Everywhere, [facility], [facility] Review    The reliability of this Medication Reconciliation is: Reliability: Reliable    The following significant changes were made:  Added:    Cough drops    Miconazole 2% cream    Midodrine x2    Miralax    Clopidogrel    Acetaminophen 650 mg     Loratadine    Vitamin C    Calcium carbonate    Ferrous sulfate    Updated:    Aspirin strength to 81 mg and chewable formulation     Vitamin D strength to 50 mcg     Fluoxetine directions    Fluticasone to scheduled daily dosing    Novolog dose to 4 units 3 times daily with meals    Novolog sliding scale directions    Allopurinol to daily (not bedtime)    Levemir dose/frequency to 5 units every 12 hours    Montelukast frequency to \" every evening\"    Sevelamer dose to 800 mg and frequency to 4 times daily with meals and at bedtime    Removed:    Acetaminophen 500 mg    Clotrimazole 1% cream    Fexofenadine    Lidocaine patch    Duoneb     Metoprolol tartrate    Ondansetron    Oxycodone    Tramadol        In addition, the patient's allergies were reviewed with the patient's chart and left as follows:   Allergies: Zosyn, Cetirizine, Pregabalin, Latex, Rosiglitazone, and Zosyn [piperacillin-tazobactam in d5w]         Medication barriers identified: None; [facility]; medications for bowels related to narcotics/pain meds, pain meds stopped but bowels med continue   Medication adherence concerns: None: medications [facility]   Understanding of emergency medications: Emergency medications [facility]    Nadiya Foster, 9/14/2021,  11:11 AM   Trudi Teresa [facility] on 9/14/2021 at 11:40 AM      "

## 2021-09-18 LAB
BACTERIA BLD CULT: NO GROWTH
BACTERIA BLD CULT: NO GROWTH

## 2021-09-28 LAB
CREATININE (EXTERNAL): 2.73 MG/DL (ref 0.4–1)
GFR ESTIMATED (EXTERNAL): 17 ML/MIN/1.73M2
GLUCOSE (EXTERNAL): 177 MG/DL (ref 70–99)
POTASSIUM (EXTERNAL): 4.3 MEQ/L (ref 3.4–5.1)

## 2021-10-01 ENCOUNTER — ANESTHESIA EVENT (OUTPATIENT)
Dept: EMERGENCY MEDICINE | Facility: OTHER | Age: 66
End: 2021-10-01

## 2021-10-01 ENCOUNTER — ANESTHESIA (OUTPATIENT)
Dept: EMERGENCY MEDICINE | Facility: OTHER | Age: 66
End: 2021-10-01

## 2021-10-01 ENCOUNTER — APPOINTMENT (OUTPATIENT)
Dept: GENERAL RADIOLOGY | Facility: OTHER | Age: 66
End: 2021-10-01
Attending: FAMILY MEDICINE
Payer: MEDICARE

## 2021-10-01 ENCOUNTER — HOSPITAL ENCOUNTER (EMERGENCY)
Facility: OTHER | Age: 66
Discharge: NURSING, REHAB OR LONG-TERM CARE | End: 2021-10-02
Attending: FAMILY MEDICINE | Admitting: FAMILY MEDICINE
Payer: MEDICARE

## 2021-10-01 VITALS
BODY MASS INDEX: 45.98 KG/M2 | HEART RATE: 78 BPM | DIASTOLIC BLOOD PRESSURE: 65 MMHG | WEIGHT: 267.9 LBS | SYSTOLIC BLOOD PRESSURE: 126 MMHG | TEMPERATURE: 96.4 F | OXYGEN SATURATION: 97 % | RESPIRATION RATE: 12 BRPM

## 2021-10-01 DIAGNOSIS — J15.9 COMMUNITY ACQUIRED BACTERIAL PNEUMONIA: ICD-10-CM

## 2021-10-01 LAB
ALBUMIN SERPL-MCNC: 3 G/DL (ref 3.5–5.7)
ALP SERPL-CCNC: 124 U/L (ref 34–104)
ALT SERPL W P-5'-P-CCNC: 10 U/L (ref 7–52)
ANION GAP SERPL CALCULATED.3IONS-SCNC: 6 MMOL/L (ref 3–14)
AST SERPL W P-5'-P-CCNC: 19 U/L (ref 13–39)
BASOPHILS # BLD AUTO: 0.1 10E3/UL (ref 0–0.2)
BASOPHILS NFR BLD AUTO: 1 %
BILIRUB SERPL-MCNC: 0.5 MG/DL (ref 0.3–1)
BUN SERPL-MCNC: 7 MG/DL (ref 7–25)
CALCIUM SERPL-MCNC: 8.6 MG/DL (ref 8.6–10.3)
CHLORIDE BLD-SCNC: 91 MMOL/L (ref 98–107)
CO2 SERPL-SCNC: 39 MMOL/L (ref 21–31)
CREAT SERPL-MCNC: 1.97 MG/DL (ref 0.6–1.2)
EOSINOPHIL # BLD AUTO: 0.2 10E3/UL (ref 0–0.7)
EOSINOPHIL NFR BLD AUTO: 4 %
ERYTHROCYTE [DISTWIDTH] IN BLOOD BY AUTOMATED COUNT: 18.5 % (ref 10–15)
GFR SERPL CREATININE-BSD FRML MDRD: 26 ML/MIN/1.73M2
GLUCOSE BLD-MCNC: 128 MG/DL (ref 70–105)
HCT VFR BLD AUTO: 29.1 % (ref 35–47)
HGB BLD-MCNC: 9.3 G/DL (ref 11.7–15.7)
IMM GRANULOCYTES # BLD: 0.1 10E3/UL
IMM GRANULOCYTES NFR BLD: 1 %
LYMPHOCYTES # BLD AUTO: 1 10E3/UL (ref 0.8–5.3)
LYMPHOCYTES NFR BLD AUTO: 16 %
MAGNESIUM SERPL-MCNC: 1.8 MG/DL (ref 1.9–2.7)
MCH RBC QN AUTO: 31.5 PG (ref 26.5–33)
MCHC RBC AUTO-ENTMCNC: 32 G/DL (ref 31.5–36.5)
MCV RBC AUTO: 99 FL (ref 78–100)
MONOCYTES # BLD AUTO: 1.2 10E3/UL (ref 0–1.3)
MONOCYTES NFR BLD AUTO: 19 %
NEUTROPHILS # BLD AUTO: 3.7 10E3/UL (ref 1.6–8.3)
NEUTROPHILS NFR BLD AUTO: 59 %
NRBC # BLD AUTO: 0 10E3/UL
NRBC BLD AUTO-RTO: 0 /100
PLATELET # BLD AUTO: 342 10E3/UL (ref 150–450)
POTASSIUM BLD-SCNC: 3 MMOL/L (ref 3.5–5.1)
PROT SERPL-MCNC: 5.4 G/DL (ref 6.4–8.9)
RBC # BLD AUTO: 2.95 10E6/UL (ref 3.8–5.2)
SARS-COV-2 RNA RESP QL NAA+PROBE: NEGATIVE
SODIUM SERPL-SCNC: 136 MMOL/L (ref 134–144)
WBC # BLD AUTO: 6.3 10E3/UL (ref 4–11)

## 2021-10-01 PROCEDURE — 93005 ELECTROCARDIOGRAM TRACING: CPT | Performed by: FAMILY MEDICINE

## 2021-10-01 PROCEDURE — 80053 COMPREHEN METABOLIC PANEL: CPT | Performed by: FAMILY MEDICINE

## 2021-10-01 PROCEDURE — 99285 EMERGENCY DEPT VISIT HI MDM: CPT | Mod: 25 | Performed by: FAMILY MEDICINE

## 2021-10-01 PROCEDURE — 85025 COMPLETE CBC W/AUTO DIFF WBC: CPT | Performed by: FAMILY MEDICINE

## 2021-10-01 PROCEDURE — C9803 HOPD COVID-19 SPEC COLLECT: HCPCS | Performed by: FAMILY MEDICINE

## 2021-10-01 PROCEDURE — 96367 TX/PROPH/DG ADDL SEQ IV INF: CPT | Performed by: FAMILY MEDICINE

## 2021-10-01 PROCEDURE — 36415 COLL VENOUS BLD VENIPUNCTURE: CPT | Performed by: FAMILY MEDICINE

## 2021-10-01 PROCEDURE — 93010 ELECTROCARDIOGRAM REPORT: CPT | Performed by: INTERNAL MEDICINE

## 2021-10-01 PROCEDURE — 96366 THER/PROPH/DIAG IV INF ADDON: CPT | Performed by: FAMILY MEDICINE

## 2021-10-01 PROCEDURE — 258N000003 HC RX IP 258 OP 636: Performed by: FAMILY MEDICINE

## 2021-10-01 PROCEDURE — 71045 X-RAY EXAM CHEST 1 VIEW: CPT

## 2021-10-01 PROCEDURE — 99284 EMERGENCY DEPT VISIT MOD MDM: CPT | Performed by: FAMILY MEDICINE

## 2021-10-01 PROCEDURE — 250N000011 HC RX IP 250 OP 636: Performed by: FAMILY MEDICINE

## 2021-10-01 PROCEDURE — U0005 INFEC AGEN DETEC AMPLI PROBE: HCPCS | Performed by: FAMILY MEDICINE

## 2021-10-01 PROCEDURE — 96365 THER/PROPH/DIAG IV INF INIT: CPT | Performed by: FAMILY MEDICINE

## 2021-10-01 PROCEDURE — 83735 ASSAY OF MAGNESIUM: CPT | Performed by: FAMILY MEDICINE

## 2021-10-01 PROCEDURE — 250N000013 HC RX MED GY IP 250 OP 250 PS 637: Mod: GY | Performed by: FAMILY MEDICINE

## 2021-10-01 RX ORDER — ONDANSETRON 4 MG/1
4 TABLET, ORALLY DISINTEGRATING ORAL ONCE
Status: COMPLETED | OUTPATIENT
Start: 2021-10-01 | End: 2021-10-01

## 2021-10-01 RX ORDER — CEFTRIAXONE SODIUM 1 G/50ML
1 INJECTION, SOLUTION INTRAVENOUS ONCE
Status: COMPLETED | OUTPATIENT
Start: 2021-10-02 | End: 2021-10-02

## 2021-10-01 RX ORDER — POTASSIUM CHLORIDE 7.45 MG/ML
10 INJECTION INTRAVENOUS ONCE
Status: COMPLETED | OUTPATIENT
Start: 2021-10-01 | End: 2021-10-01

## 2021-10-01 RX ORDER — MAGNESIUM OXIDE 400 MG/1
400 TABLET ORAL DAILY
Status: DISCONTINUED | OUTPATIENT
Start: 2021-10-01 | End: 2021-10-02 | Stop reason: HOSPADM

## 2021-10-01 RX ORDER — SODIUM CHLORIDE 9 MG/ML
INJECTION, SOLUTION INTRAVENOUS CONTINUOUS
Status: DISCONTINUED | OUTPATIENT
Start: 2021-10-01 | End: 2021-10-02 | Stop reason: HOSPADM

## 2021-10-01 RX ORDER — ONDANSETRON 2 MG/ML
4 INJECTION INTRAMUSCULAR; INTRAVENOUS EVERY 30 MIN PRN
Status: DISCONTINUED | OUTPATIENT
Start: 2021-10-01 | End: 2021-10-01

## 2021-10-01 RX ADMIN — MAGNESIUM OXIDE TAB 400 MG (241.3 MG ELEMENTAL MG) 400 MG: 400 (241.3 MG) TAB at 21:09

## 2021-10-01 RX ADMIN — SODIUM CHLORIDE 1000 ML: 9 INJECTION, SOLUTION INTRAVENOUS at 21:10

## 2021-10-01 RX ADMIN — ONDANSETRON 4 MG: 4 TABLET, ORALLY DISINTEGRATING ORAL at 20:34

## 2021-10-01 RX ADMIN — POTASSIUM CHLORIDE 10 MEQ: 7.46 INJECTION, SOLUTION INTRAVENOUS at 21:10

## 2021-10-02 LAB
ATRIAL RATE - MUSE: 81 BPM
DIASTOLIC BLOOD PRESSURE - MUSE: NORMAL MMHG
INTERPRETATION ECG - MUSE: NORMAL
P AXIS - MUSE: 78 DEGREES
PR INTERVAL - MUSE: 198 MS
QRS DURATION - MUSE: 108 MS
QT - MUSE: 442 MS
QTC - MUSE: 513 MS
R AXIS - MUSE: 1 DEGREES
SYSTOLIC BLOOD PRESSURE - MUSE: NORMAL MMHG
T AXIS - MUSE: 83 DEGREES
VENTRICULAR RATE- MUSE: 81 BPM

## 2021-10-02 PROCEDURE — 250N000011 HC RX IP 250 OP 636: Performed by: FAMILY MEDICINE

## 2021-10-02 RX ORDER — AZITHROMYCIN 250 MG/1
TABLET, FILM COATED ORAL
Qty: 6 TABLET | Refills: 0 | Status: SHIPPED | OUTPATIENT
Start: 2021-10-02 | End: 2021-10-07

## 2021-10-02 RX ADMIN — CEFTRIAXONE SODIUM 1 G: 1 INJECTION, SOLUTION INTRAVENOUS at 00:04

## 2021-10-02 ASSESSMENT — ENCOUNTER SYMPTOMS
CONFUSION: 0
SHORTNESS OF BREATH: 0
NECK STIFFNESS: 0
DIFFICULTY URINATING: 0
HEADACHES: 0
COLOR CHANGE: 0
FEVER: 0
ARTHRALGIAS: 0
EYE REDNESS: 0
ABDOMINAL PAIN: 0

## 2021-10-02 NOTE — ED TRIAGE NOTES
ED Nursing Triage Note (General)   ________________________________    Lucila Willett is a 66 year old Female that presents to triage ambulance  With history of  Nausea and dizziness that started yesterday and not getting better.    /65   Pulse 83   Temp (!) 96.4  F (35.8  C) (Tympanic)   Resp 16   Wt 121.5 kg (267 lb 14.4 oz)   SpO2 96%   BMI 45.98 kg/m  t  Patient appears alert , in no acute distress., and cooperative behavior.    GCS Total = 15  Airway: intact  Breathing noted as Normal  Circulation Normal  Skin:  Normal      [facility] SITU[facility] Other:  [place]

## 2021-10-02 NOTE — ED PROVIDER NOTES
History     Chief Complaint   Patient presents with     Nausea     Dizziness     HPI  Lucila Willett is a 66 year old female who presents from [facility] with nausea lightheadedness weakness.  Does not endorse shortness of breath or cough.  Recent surgery left lower extremity.  Reviewed nurses notes below, similar history is related to me.  Lucila Willett is a 66 year old Female that presents to triage ambulance  With history of  Nausea and dizziness that started yesterday and not getting better.    Allergies:  Allergies   Allergen Reactions     Zosyn Hives and Rash     Given IV benadryl, and Pepcid in outside facility.      Cetirizine      Other reaction(s): *Unknown - Pt Doesn't Remember     Pregabalin      Other reaction(s): Other - Describe In Comment Field  Floaters in eyes.      Latex Rash     Patient states she gets a rash     Rosiglitazone Rash     Patient states she gets a rash     Zosyn [Piperacillin-Tazobactam In D5w] Rash       Problem List:    Patient Active Problem List    Diagnosis Date Noted     Cellulitis 08/21/2020     Priority: Medium     Leukocytosis 12/19/2016     Priority: Medium     Hyponatremia 06/10/2016     Priority: Medium     Cellulitis of left lower extremity 06/09/2016     Priority: Medium     Severe sepsis (H) 06/09/2016     Priority: Medium     Foot ulcer (H) 01/11/2016     Priority: Medium     Diastolic heart failure (H) 07/01/2015     Priority: Medium     Chronic pain disorder 06/30/2015     Priority: Medium     Overview:   Overview:   [facility] Agreement for Opioid Treatment  PHYSICIAN:  Gauri Grimm MD  PHARMACY:  [facility] in [place], [place]  PHARMACY PHONE:  [phone]    Last two urine drug screens have been negative - on 1/7/13 it had been two days since she took lortab.  Negative for norco aug 2015       Type 2 diabetes mellitus (H) 06/11/2013     Priority: Medium     Overview:   Overview:   LDL 94 - on crestor  Off valsartan because of worsening renal function  ? Hypotension (10/2014: will retry low dose lisinopril)  On ASA  Right diabetic retinopathy on right 2/2014       Hypokalemia 09/13/2012     Priority: Medium     Anemia 09/12/2012     Priority: Medium     Cellulitis and abscess of leg, except foot 09/12/2012     Priority: Medium     Diabetes mellitus, type II (H) 09/12/2012     Priority: Medium     Overview:   a system change updated this record. This will not affect patient care or billing. This comment can be deleted.       Hypertensive heart failure with end stage renal disease (H) 09/12/2012     Priority: Medium     Morbid obesity (H) 09/12/2012     Priority: Medium     Chronic kidney disease, stage IV (severe) (H) 03/23/2012     Priority: Medium     Overview:   Overview:   IMO Update 10/11       Atherosclerosis of coronary artery 03/17/2010     Priority: Medium     Overview:   Overview:   IMO Update       Gout 02/29/2008     Priority: Medium     Essential hypertension 02/27/2008     Priority: Medium     Hypothyroidism 02/27/2008     Priority: Medium     Overview:   Overview:   IMO Update 10/11       Sleep apnea 02/27/2008     Priority: Medium     Overview:   Overview:   10/2012: cpap did not work - trying BIPAP          Past Medical History:    Past Medical History:   Diagnosis Date     Atherosclerotic heart disease of native coronary artery without angina pectoris      Cellulitis      Chronic kidney disease      Diastolic congestive heart failure (H)      Hypothyroidism      Major depressive disorder, single episode      Obesity      Personal history of other medical treatment (CODE)      Sleep apnea      Type 2 diabetes mellitus without complications (H)        Past Surgical History:    Past Surgical History:   Procedure Laterality Date     CHOLECYSTECTOMY      No Comments Provided     OTHER SURGICAL HISTORY      275805,OTHER     OTHER SURGICAL HISTORY      13183.0,MS SPINE FUSION ANTER 3 SGMTS     OTHER SURGICAL HISTORY      LOC-1006.04,CABG     OTHER  "SURGICAL HISTORY      [id number],ENDOSCOPY GI       Family History:    History reviewed. No pertinent family history.    Social History:  Marital Status:   [4]  Social History     Tobacco Use     Smoking status: Former Smoker     Packs/day: 2.00     Years: 20.00     Pack years: 40.00     Types: Cigarettes     Quit date: 1997     Years since quittin.0     Smokeless tobacco: Never Used   Substance Use Topics     Alcohol use: Yes     Alcohol/week: 0.0 standard drinks     Comment: Alcoholic Drinks/day: Occasionally     Drug use: Unknown     Types: Other     Comment: Drug use: No        Medications:    azithromycin (ZITHROMAX) 250 MG tablet  allopurinol (ZYLOPRIM) 100 MG tablet  alum & mag hydroxide-simethicone (MAALOX) 200-200-20 MG/5ML SUSP suspension  aspirin (GOODSENSE ASPIRIN) 81 MG chewable tablet  atorvastatin (LIPITOR) 80 MG tablet  B Complex-C-Folic Acid (DIALYVITE 800 PO)  blood glucose (NO BRAND SPECIFIED) lancets standard  blood glucose monitoring (NO BRAND SPECIFIED) test strip  buPROPion (WELLBUTRIN XL) 150 MG 24 hr tablet  clopidogrel (PLAVIX) 75 MG tablet  FLUoxetine (PROZAC) 20 MG capsule  fluticasone (FLONASE) 50 MCG/ACT spray  gabapentin (NEURONTIN) 100 MG capsule  guaiFENesin (ROBITUSSIN) 20 mg/mL SOLN solution  hypromellose (GENTEAL) 0.3 % SOLN ophthalmic solution  insulin aspart (NOVOLOG PEN) 100 UNIT/ML injection  insulin aspart (NOVOLOG PEN) 100 UNIT/ML pen  insulin detemir (LEVEMIR PEN) 100 UNIT/ML pen  levothyroxine (SYNTHROID/LEVOTHROID) 200 MCG tablet  levothyroxine (SYNTHROID/LEVOTHROID) 25 MCG tablet  lidocaine-prilocaine (EMLA) 2.5-2.5 % external cream  loratadine (CLARITIN) 10 MG tablet  magnesium hydroxide (MILK OF MAGNESIA) 400 MG/5ML suspension  Menthol (COUGH DROPS) 5.8 MG LOZG  miconazole (MICATIN) 2 % cream  midodrine (PROAMATINE) 10 MG tablet  midodrine (PROAMATINE) 10 MG tablet  montelukast (SINGULAIR) 10 MG tablet  Needle, Disp, 31G X \" MISC  nitroGLYcerin " (NITROSTAT) 0.4 MG sublingual tablet  nystatin (MYCOSTATIN) 736401 UNIT/GM external powder  polyethylene glycol (MIRALAX) 17 GM/Dose powder  senna-docusate (SENOKOT-S/PERICOLACE) 8.6-50 MG tablet  sevelamer carbonate (RENVELA) 800 MG tablet  vitamin D3 (CHOLECALCIFEROL) 50 mcg (2000 units) tablet          Review of Systems   Constitutional: Negative for fever.   HENT: Negative for congestion.    Eyes: Negative for redness.   Respiratory: Negative for shortness of breath.    Cardiovascular: Negative for chest pain.   Gastrointestinal: Negative for abdominal pain.   Genitourinary: Negative for difficulty urinating.   Musculoskeletal: Negative for arthralgias and neck stiffness.   Skin: Negative for color change.   Neurological: Negative for headaches.   Psychiatric/Behavioral: Negative for confusion.       Physical Exam   BP: 126/65  Pulse: 83  Temp: (!) 96.4  F (35.8  C)  Resp: 16  Weight: 121.5 kg (267 lb 14.4 oz)  SpO2: 96 %      Physical Exam  Vitals and nursing note reviewed.   Constitutional:       General: She is not in acute distress.     Appearance: She is not diaphoretic.   HENT:      Head: Atraumatic.      Mouth/Throat:      Pharynx: No oropharyngeal exudate.   Eyes:      General: No scleral icterus.     Pupils: Pupils are equal, round, and reactive to light.   Cardiovascular:      Heart sounds: Normal heart sounds.   Pulmonary:      Effort: Pulmonary effort is normal. No respiratory distress.      Breath sounds: Rhonchi present.   Abdominal:      General: Bowel sounds are normal.      Palpations: Abdomen is soft.      Tenderness: There is no abdominal tenderness.   Musculoskeletal:         General: No tenderness.   Skin:     General: Skin is warm.      Findings: No rash.         ED Course   EKG: Normal sinus rhythm, QTC prolongation 513 ms.  Nonspecific ST-T abnormality.     Procedures    Results for orders placed or performed during the hospital encounter of 10/01/21 (from the past 24 hour(s))   CBC with  platelets differential    Narrative    The following orders were created for panel order CBC with platelets differential.  Procedure                               Abnormality         Status                     ---------                               -----------         ------                     CBC with platelets and d...[[id number]]  Abnormal            Final result                 Please view results for these tests on the individual orders.   Comprehensive metabolic panel   Result Value Ref Range    Sodium 136 134 - 144 mmol/L    Potassium 3.0 (L) 3.5 - 5.1 mmol/L    Chloride 91 (L) 98 - 107 mmol/L    Carbon Dioxide (CO2) 39 (H) 21 - 31 mmol/L    Anion Gap 6 3 - 14 mmol/L    Urea Nitrogen 7 7 - 25 mg/dL    Creatinine 1.97 (H) 0.60 - 1.20 mg/dL    Calcium 8.6 8.6 - 10.3 mg/dL    Glucose 128 (H) 70 - 105 mg/dL    Alkaline Phosphatase 124 (H) 34 - 104 U/L    AST 19 13 - 39 U/L    ALT 10 7 - 52 U/L    Protein Total 5.4 (L) 6.4 - 8.9 g/dL    Albumin 3.0 (L) 3.5 - 5.7 g/dL    Bilirubin Total 0.5 0.3 - 1.0 mg/dL    GFR Estimate 26 (L) >60 mL/min/1.73m2   CBC with platelets and differential   Result Value Ref Range    WBC Count 6.3 4.0 - 11.0 10e3/uL    RBC Count 2.95 (L) 3.80 - 5.20 10e6/uL    Hemoglobin 9.3 (L) 11.7 - 15.7 g/dL    Hematocrit 29.1 (L) 35.0 - 47.0 %    MCV 99 78 - 100 fL    MCH 31.5 26.5 - 33.0 pg    MCHC 32.0 31.5 - 36.5 g/dL    RDW 18.5 (H) 10.0 - 15.0 %    Platelet Count 342 150 - 450 10e3/uL    % Neutrophils 59 %    % Lymphocytes 16 %    % Monocytes 19 %    % Eosinophils 4 %    % Basophils 1 %    % Immature Granulocytes 1 %    NRBCs per 100 WBC 0 <1 /100    Absolute Neutrophils 3.7 1.6 - 8.3 10e3/uL    Absolute Lymphocytes 1.0 0.8 - 5.3 10e3/uL    Absolute Monocytes 1.2 0.0 - 1.3 10e3/uL    Absolute Eosinophils 0.2 0.0 - 0.7 10e3/uL    Absolute Basophils 0.1 0.0 - 0.2 10e3/uL    Absolute Immature Granulocytes 0.1 (H) <=0.0 10e3/uL    Absolute NRBCs 0.0 10e3/uL   XR Chest Port 1 View    Narrative     PROCEDURE:  XR CHEST PORT 1 VIEW    HISTORY:  sob.     COMPARISON:  None.    FINDINGS:   The heart is enlarged. Postoperative changes are seen in the  mediastinum. There are widespread pulmonary parenchymal opacities that  have worsened as compared to August 2, 2021 and are most consistent  with pneumonia. No pleural effusion or pneumothorax.      Impression    IMPRESSION:  Widespread pulmonary parenchymal opacities most  consistent with pneumonia      JOSÉ MIGUEL MARIE MD         SYSTEM ID:  [id number]   Magnesium   Result Value Ref Range    Magnesium 1.8 (L) 1.9 - 2.7 mg/dL   Symptomatic COVID-19 Virus (Coronavirus) by PCR Nasopharyngeal    Specimen: Nasopharyngeal; Swab   Result Value Ref Range    SARS CoV2 PCR Negative Negative    Narrative    Testing was performed using the Xpert Xpress SARS-CoV-2 Assay on the   Cepheid Gene-Xpert Instrument Systems. Additional information about   this Emergency Use Authorization (EUA) assay can be found via the Lab   Guide. This test should be ordered for the detection of SARS-CoV-2 in   individuals who meet SARS-CoV-2 clinical and/or epidemiological   criteria. Test performance is unknown in asymptomatic patients. This   test is for in vitro diagnostic use under the FDA EUA for   laboratories certified under CLIA to perform high complexity testing.   This test has not been FDA cleared or approved. A negative result   does not rule out the presence of PCR inhibitors in the specimen or   target RNA in concentration below the limit of detection for the   assay. The possibility of a false negative should be considered if   the patient's recent exposure or clinical presentation suggests   COVID-19. This test was validated by [facility]. This laboratory is certified under the Clinic  al Laboratory Improvement Amendments (CLIA) as qualified to perform high complex  ity clinical laboratory testing.       Medications   0.9% sodium  chloride BOLUS (0 mLs Intravenous Stopped 10/1/21 2250)     Followed by   sodium chloride 0.9% infusion (has no administration in time range)   magnesium oxide (MAG-OX) tablet 400 mg (400 mg Oral Given 10/1/21 2109)   cefTRIAXone in d5w (ROCEPHIN) intermittent infusion 1 g (1 g Intravenous New Bag 10/2/21 0004)   ondansetron (ZOFRAN-ODT) ODT tab 4 mg (4 mg Oral Given 10/1/21 2034)   potassium chloride 10 mEq in 100 mL sterile water intermittent infusion (premix) (0 mEq Intravenous Stopped 10/1/21 2251)       Assessments & Plan (with Medical Decision Making)     I have reviewed the nursing notes.    I have reviewed the findings, diagnosis, plan and need for follow up with the patient.    New Prescriptions    AZITHROMYCIN (ZITHROMAX) 250 MG TABLET    Take 2 tablets (500 mg) by mouth daily for 1 day, THEN 1 tablet (250 mg) daily for 4 days.     Rocephin in the ED x1.  Other interventions as above.  Discharge back to [facility] on azithromycin.  Patient does not have increased oxygen requirement from baseline.  Chest x-ray is concerning for pneumonia.  No beds available for admission, [facility] divert.  [facility] as well.  Patient able to go back to the skilled nursing facility and at least has a safe place and will be cared for by skilled nursing.  Patient is in agreement with this.  Patient verbalized understanding plans agreement left ED improving condition.  Final diagnoses:   Community acquired bacterial pneumonia       10/1/2021   [facility]     Tong Tsai MD  10/02/21 0142       Tong Tsai MD  10/02/21 [id number]

## 2021-11-25 ENCOUNTER — LAB REQUISITION (OUTPATIENT)
Dept: LAB | Facility: OTHER | Age: 66
End: 2021-11-25
Payer: MEDICARE

## 2021-11-25 DIAGNOSIS — R19.7 DIARRHEA, UNSPECIFIED: ICD-10-CM

## 2021-11-25 DIAGNOSIS — Z01.818 ENCOUNTER FOR OTHER PREPROCEDURAL EXAMINATION: ICD-10-CM

## 2021-11-25 PROCEDURE — U0003 INFECTIOUS AGENT DETECTION BY NUCLEIC ACID (DNA OR RNA); SEVERE ACUTE RESPIRATORY SYNDROME CORONAVIRUS 2 (SARS-COV-2) (CORONAVIRUS DISEASE [COVID-19]), AMPLIFIED PROBE TECHNIQUE, MAKING USE OF HIGH THROUGHPUT TECHNOLOGIES AS DESCRIBED BY CMS-2020-01-R: HCPCS | Performed by: NURSE PRACTITIONER

## 2021-11-26 LAB — SARS-COV-2 RNA RESP QL NAA+PROBE: NEGATIVE

## 2022-01-01 ENCOUNTER — HOSPITAL ENCOUNTER (EMERGENCY)
Facility: OTHER | Age: 67
Discharge: NURSING, REHAB OR LONG-TERM CARE | End: 2022-11-14
Attending: FAMILY MEDICINE | Admitting: FAMILY MEDICINE
Payer: COMMERCIAL

## 2022-01-01 ENCOUNTER — DOCUMENTATION ONLY (OUTPATIENT)
Dept: OTHER | Facility: CLINIC | Age: 67
End: 2022-01-01

## 2022-01-01 ENCOUNTER — LAB REQUISITION (OUTPATIENT)
Dept: LAB | Facility: OTHER | Age: 67
End: 2022-01-01
Payer: COMMERCIAL

## 2022-01-01 ENCOUNTER — APPOINTMENT (OUTPATIENT)
Dept: GENERAL RADIOLOGY | Facility: OTHER | Age: 67
End: 2022-01-01
Attending: FAMILY MEDICINE
Payer: COMMERCIAL

## 2022-01-01 VITALS
WEIGHT: 224 LBS | HEART RATE: 82 BPM | RESPIRATION RATE: 21 BRPM | DIASTOLIC BLOOD PRESSURE: 61 MMHG | HEIGHT: 64 IN | TEMPERATURE: 98.1 F | OXYGEN SATURATION: 96 % | BODY MASS INDEX: 38.24 KG/M2 | SYSTOLIC BLOOD PRESSURE: 110 MMHG

## 2022-01-01 DIAGNOSIS — E55.9 VITAMIN D DEFICIENCY, UNSPECIFIED: ICD-10-CM

## 2022-01-01 DIAGNOSIS — E03.9 HYPOTHYROIDISM, UNSPECIFIED: ICD-10-CM

## 2022-01-01 DIAGNOSIS — R79.81 ABNORMAL BLOOD-GAS LEVEL: ICD-10-CM

## 2022-01-01 DIAGNOSIS — Z79.4 LONG TERM (CURRENT) USE OF INSULIN (H): ICD-10-CM

## 2022-01-01 DIAGNOSIS — E11.21 TYPE 2 DIABETES MELLITUS WITH DIABETIC NEPHROPATHY (H): ICD-10-CM

## 2022-01-01 DIAGNOSIS — I50.30 UNSPECIFIED DIASTOLIC (CONGESTIVE) HEART FAILURE (H): ICD-10-CM

## 2022-01-01 DIAGNOSIS — R06.02 SHORTNESS OF BREATH: ICD-10-CM

## 2022-01-01 LAB
ALBUMIN SERPL BCG-MCNC: 4 G/DL (ref 3.5–5.2)
ALBUMIN SERPL-MCNC: 3.3 G/DL (ref 3.5–5.7)
ALP SERPL-CCNC: 163 U/L (ref 34–104)
ALP SERPL-CCNC: 213 U/L (ref 35–104)
ALT SERPL W P-5'-P-CCNC: 10 U/L (ref 7–52)
ALT SERPL W P-5'-P-CCNC: 16 U/L (ref 10–35)
ANION GAP SERPL CALCULATED.3IONS-SCNC: 13 MMOL/L (ref 7–15)
ANION GAP SERPL CALCULATED.3IONS-SCNC: 15 MMOL/L (ref 7–15)
ANION GAP SERPL CALCULATED.3IONS-SCNC: 8 MMOL/L (ref 3–14)
AST SERPL W P-5'-P-CCNC: 13 U/L (ref 13–39)
AST SERPL W P-5'-P-CCNC: 20 U/L (ref 10–35)
ATRIAL RATE - MUSE: 99 BPM
BASOPHILS # BLD AUTO: 0.1 10E3/UL (ref 0–0.2)
BASOPHILS NFR BLD AUTO: 1 %
BILIRUB SERPL-MCNC: 0.3 MG/DL
BILIRUB SERPL-MCNC: 0.3 MG/DL (ref 0.3–1)
BUN SERPL-MCNC: 14.9 MG/DL (ref 8–23)
BUN SERPL-MCNC: 19 MG/DL (ref 7–25)
BUN SERPL-MCNC: 47.3 MG/DL (ref 8–23)
CALCIUM SERPL-MCNC: 8.8 MG/DL (ref 8.8–10.2)
CALCIUM SERPL-MCNC: 8.9 MG/DL (ref 8.6–10.3)
CALCIUM SERPL-MCNC: 9.2 MG/DL (ref 8.8–10.2)
CHLORIDE BLD-SCNC: 96 MMOL/L (ref 98–107)
CHLORIDE SERPL-SCNC: 96 MMOL/L (ref 98–107)
CHLORIDE SERPL-SCNC: 98 MMOL/L (ref 98–107)
CO2 SERPL-SCNC: 29 MMOL/L (ref 21–31)
CREAT SERPL-MCNC: 2.04 MG/DL (ref 0.51–0.95)
CREAT SERPL-MCNC: 3.14 MG/DL (ref 0.6–1.2)
CREAT SERPL-MCNC: 4.32 MG/DL (ref 0.51–0.95)
DEPRECATED CALCIDIOL+CALCIFEROL SERPL-MC: 62 UG/L (ref 20–75)
DEPRECATED HCO3 PLAS-SCNC: 24 MMOL/L (ref 22–29)
DEPRECATED HCO3 PLAS-SCNC: 27 MMOL/L (ref 22–29)
DIASTOLIC BLOOD PRESSURE - MUSE: NORMAL MMHG
EOSINOPHIL # BLD AUTO: 0.5 10E3/UL (ref 0–0.7)
EOSINOPHIL NFR BLD AUTO: 4 %
ERYTHROCYTE [DISTWIDTH] IN BLOOD BY AUTOMATED COUNT: 15.6 % (ref 10–15)
ERYTHROCYTE [DISTWIDTH] IN BLOOD BY AUTOMATED COUNT: 16 % (ref 10–15)
ERYTHROCYTE [DISTWIDTH] IN BLOOD BY AUTOMATED COUNT: 17 % (ref 10–15)
FLUAV RNA SPEC QL NAA+PROBE: NEGATIVE
FLUBV RNA RESP QL NAA+PROBE: NEGATIVE
GFR SERPL CREATININE-BSD FRML MDRD: 11 ML/MIN/1.73M2
GFR SERPL CREATININE-BSD FRML MDRD: 15 ML/MIN/1.73M2
GFR SERPL CREATININE-BSD FRML MDRD: 26 ML/MIN/1.73M2
GLUCOSE BLD-MCNC: 241 MG/DL (ref 70–105)
GLUCOSE SERPL-MCNC: 182 MG/DL (ref 70–99)
GLUCOSE SERPL-MCNC: 222 MG/DL (ref 70–99)
HBA1C MFR BLD: 5.1 % (ref 4–6.2)
HCT VFR BLD AUTO: 28.8 % (ref 35–47)
HCT VFR BLD AUTO: 30.2 % (ref 35–47)
HCT VFR BLD AUTO: 36.4 % (ref 35–47)
HGB BLD-MCNC: 11.6 G/DL (ref 11.7–15.7)
HGB BLD-MCNC: 9.2 G/DL (ref 11.7–15.7)
HGB BLD-MCNC: 9.5 G/DL (ref 11.7–15.7)
HOLD SPECIMEN: NORMAL
HOLD SPECIMEN: NORMAL
IMM GRANULOCYTES # BLD: 0.2 10E3/UL
IMM GRANULOCYTES NFR BLD: 1 %
INTERPRETATION ECG - MUSE: NORMAL
LACTATE SERPL-SCNC: 1 MMOL/L (ref 0.7–2)
LYMPHOCYTES # BLD AUTO: 1.6 10E3/UL (ref 0.8–5.3)
LYMPHOCYTES NFR BLD AUTO: 13 %
MCH RBC QN AUTO: 30.4 PG (ref 26.5–33)
MCH RBC QN AUTO: 30.4 PG (ref 26.5–33)
MCH RBC QN AUTO: 31 PG (ref 26.5–33)
MCHC RBC AUTO-ENTMCNC: 31.5 G/DL (ref 31.5–36.5)
MCHC RBC AUTO-ENTMCNC: 31.9 G/DL (ref 31.5–36.5)
MCHC RBC AUTO-ENTMCNC: 31.9 G/DL (ref 31.5–36.5)
MCV RBC AUTO: 95 FL (ref 78–100)
MCV RBC AUTO: 97 FL (ref 78–100)
MCV RBC AUTO: 97 FL (ref 78–100)
MONOCYTES # BLD AUTO: 1.6 10E3/UL (ref 0–1.3)
MONOCYTES NFR BLD AUTO: 13 %
NEUTROPHILS # BLD AUTO: 8.6 10E3/UL (ref 1.6–8.3)
NEUTROPHILS NFR BLD AUTO: 68 %
NRBC # BLD AUTO: 0 10E3/UL
NRBC BLD AUTO-RTO: 0 /100
NT-PROBNP SERPL-MCNC: 944 PG/ML (ref 0–100)
NT-PROBNP SERPL-MCNC: ABNORMAL PG/ML (ref 0–900)
P AXIS - MUSE: 79 DEGREES
PLATELET # BLD AUTO: 278 10E3/UL (ref 150–450)
PLATELET # BLD AUTO: 281 10E3/UL (ref 150–450)
PLATELET # BLD AUTO: 341 10E3/UL (ref 150–450)
POTASSIUM BLD-SCNC: 3.9 MMOL/L (ref 3.5–5.1)
POTASSIUM SERPL-SCNC: 4.1 MMOL/L (ref 3.4–5.3)
POTASSIUM SERPL-SCNC: 4.8 MMOL/L (ref 3.4–5.3)
PR INTERVAL - MUSE: 188 MS
PROT SERPL-MCNC: 5.7 G/DL (ref 6.4–8.9)
PROT SERPL-MCNC: 6.8 G/DL (ref 6.4–8.3)
QRS DURATION - MUSE: 108 MS
QT - MUSE: 372 MS
QTC - MUSE: 477 MS
R AXIS - MUSE: -48 DEGREES
RBC # BLD AUTO: 2.97 10E6/UL (ref 3.8–5.2)
RBC # BLD AUTO: 3.12 10E6/UL (ref 3.8–5.2)
RBC # BLD AUTO: 3.82 10E6/UL (ref 3.8–5.2)
RSV RNA SPEC NAA+PROBE: NEGATIVE
SARS-COV-2 RNA RESP QL NAA+PROBE: NEGATIVE
SODIUM SERPL-SCNC: 133 MMOL/L (ref 134–144)
SODIUM SERPL-SCNC: 136 MMOL/L (ref 136–145)
SODIUM SERPL-SCNC: 137 MMOL/L (ref 136–145)
SYSTOLIC BLOOD PRESSURE - MUSE: NORMAL MMHG
T AXIS - MUSE: 93 DEGREES
TSH SERPL DL<=0.005 MIU/L-ACNC: 2.31 UIU/ML (ref 0.3–4.2)
URATE SERPL-MCNC: 2.6 MG/DL (ref 2.4–5.7)
VENTRICULAR RATE- MUSE: 99 BPM
WBC # BLD AUTO: 12.5 10E3/UL (ref 4–11)
WBC # BLD AUTO: 6.6 10E3/UL (ref 4–11)
WBC # BLD AUTO: 7.4 10E3/UL (ref 4–11)

## 2022-01-01 PROCEDURE — 93005 ELECTROCARDIOGRAM TRACING: CPT | Performed by: FAMILY MEDICINE

## 2022-01-01 PROCEDURE — 82306 VITAMIN D 25 HYDROXY: CPT | Performed by: NURSE PRACTITIONER

## 2022-01-01 PROCEDURE — 84443 ASSAY THYROID STIM HORMONE: CPT | Performed by: NURSE PRACTITIONER

## 2022-01-01 PROCEDURE — 80048 BASIC METABOLIC PNL TOTAL CA: CPT | Performed by: FAMILY MEDICINE

## 2022-01-01 PROCEDURE — 71045 X-RAY EXAM CHEST 1 VIEW: CPT | Mod: TC

## 2022-01-01 PROCEDURE — 36415 COLL VENOUS BLD VENIPUNCTURE: CPT | Performed by: FAMILY MEDICINE

## 2022-01-01 PROCEDURE — 83605 ASSAY OF LACTIC ACID: CPT | Performed by: FAMILY MEDICINE

## 2022-01-01 PROCEDURE — 85027 COMPLETE CBC AUTOMATED: CPT | Performed by: NURSE PRACTITIONER

## 2022-01-01 PROCEDURE — 93010 ELECTROCARDIOGRAM REPORT: CPT | Performed by: INTERNAL MEDICINE

## 2022-01-01 PROCEDURE — 80053 COMPREHEN METABOLIC PANEL: CPT | Performed by: NURSE PRACTITIONER

## 2022-01-01 PROCEDURE — 84550 ASSAY OF BLOOD/URIC ACID: CPT | Performed by: NURSE PRACTITIONER

## 2022-01-01 PROCEDURE — C9803 HOPD COVID-19 SPEC COLLECT: HCPCS | Performed by: FAMILY MEDICINE

## 2022-01-01 PROCEDURE — 99283 EMERGENCY DEPT VISIT LOW MDM: CPT | Mod: CS | Performed by: FAMILY MEDICINE

## 2022-01-01 PROCEDURE — 83880 ASSAY OF NATRIURETIC PEPTIDE: CPT | Performed by: NURSE PRACTITIONER

## 2022-01-01 PROCEDURE — 87637 SARSCOV2&INF A&B&RSV AMP PRB: CPT | Performed by: FAMILY MEDICINE

## 2022-01-01 PROCEDURE — 99285 EMERGENCY DEPT VISIT HI MDM: CPT | Mod: 25,CS | Performed by: FAMILY MEDICINE

## 2022-01-01 PROCEDURE — 83036 HEMOGLOBIN GLYCOSYLATED A1C: CPT | Performed by: NURSE PRACTITIONER

## 2022-01-01 PROCEDURE — 85025 COMPLETE CBC W/AUTO DIFF WBC: CPT | Performed by: FAMILY MEDICINE

## 2022-01-01 PROCEDURE — 83880 ASSAY OF NATRIURETIC PEPTIDE: CPT | Performed by: FAMILY MEDICINE

## 2022-01-01 RX ORDER — IPRATROPIUM BROMIDE AND ALBUTEROL SULFATE 2.5; .5 MG/3ML; MG/3ML
3 SOLUTION RESPIRATORY (INHALATION)
Status: DISCONTINUED | OUTPATIENT
Start: 2022-01-01 | End: 2022-01-01 | Stop reason: HOSPADM

## 2022-01-01 RX ORDER — ONDANSETRON 4 MG/1
4 TABLET, ORALLY DISINTEGRATING ORAL EVERY 8 HOURS PRN
Qty: 5 TABLET | Refills: 0 | Status: SHIPPED | OUTPATIENT
Start: 2022-01-01

## 2022-01-01 RX ORDER — ACETAMINOPHEN 325 MG/1
325-650 TABLET ORAL EVERY 6 HOURS PRN
COMMUNITY

## 2022-01-01 ASSESSMENT — ENCOUNTER SYMPTOMS
SHORTNESS OF BREATH: 1
NAUSEA: 1

## 2022-01-01 ASSESSMENT — ACTIVITIES OF DAILY LIVING (ADL)
ADLS_ACUITY_SCORE: 35
ADLS_ACUITY_SCORE: 35

## 2022-04-14 ENCOUNTER — LAB REQUISITION (OUTPATIENT)
Dept: LAB | Facility: OTHER | Age: 67
End: 2022-04-14
Payer: COMMERCIAL

## 2022-04-14 DIAGNOSIS — N25.81 SECONDARY HYPERPARATHYROIDISM OF RENAL ORIGIN (H): ICD-10-CM

## 2022-04-14 DIAGNOSIS — E11.22 TYPE 2 DIABETES MELLITUS WITH DIABETIC CHRONIC KIDNEY DISEASE (H): ICD-10-CM

## 2022-04-14 DIAGNOSIS — F32.A DEPRESSION, UNSPECIFIED: ICD-10-CM

## 2022-04-14 DIAGNOSIS — E66.01 MORBID (SEVERE) OBESITY DUE TO EXCESS CALORIES (H): ICD-10-CM

## 2022-04-14 DIAGNOSIS — E03.9 HYPOTHYROIDISM, UNSPECIFIED: ICD-10-CM

## 2022-04-14 LAB
ALBUMIN SERPL-MCNC: 3.5 G/DL (ref 3.5–5.7)
ALP SERPL-CCNC: 123 U/L (ref 34–104)
ALT SERPL W P-5'-P-CCNC: 30 U/L (ref 7–52)
ANION GAP SERPL CALCULATED.3IONS-SCNC: 12 MMOL/L (ref 3–14)
AST SERPL W P-5'-P-CCNC: 24 U/L (ref 13–39)
BASOPHILS # BLD AUTO: 0.2 10E3/UL (ref 0–0.2)
BASOPHILS NFR BLD AUTO: 1 %
BILIRUB SERPL-MCNC: 0.4 MG/DL (ref 0.3–1)
BUN SERPL-MCNC: 28 MG/DL (ref 7–25)
CALCIUM SERPL-MCNC: 9.6 MG/DL (ref 8.6–10.3)
CHLORIDE BLD-SCNC: 97 MMOL/L (ref 98–107)
CO2 SERPL-SCNC: 29 MMOL/L (ref 21–31)
CREAT SERPL-MCNC: 3.71 MG/DL (ref 0.6–1.2)
EOSINOPHIL # BLD AUTO: 0.5 10E3/UL (ref 0–0.7)
EOSINOPHIL NFR BLD AUTO: 4 %
ERYTHROCYTE [DISTWIDTH] IN BLOOD BY AUTOMATED COUNT: 16.5 % (ref 10–15)
GFR SERPL CREATININE-BSD FRML MDRD: 13 ML/MIN/1.73M2
GLUCOSE BLD-MCNC: 202 MG/DL (ref 70–105)
HBA1C MFR BLD: 7 % (ref 4–6.2)
HCT VFR BLD AUTO: 32.7 % (ref 35–47)
HGB BLD-MCNC: 10.5 G/DL (ref 11.7–15.7)
IMM GRANULOCYTES # BLD: 0.4 10E3/UL
IMM GRANULOCYTES NFR BLD: 3 %
LYMPHOCYTES # BLD AUTO: 2.2 10E3/UL (ref 0.8–5.3)
LYMPHOCYTES NFR BLD AUTO: 18 %
MCH RBC QN AUTO: 29.2 PG (ref 26.5–33)
MCHC RBC AUTO-ENTMCNC: 32.1 G/DL (ref 31.5–36.5)
MCV RBC AUTO: 91 FL (ref 78–100)
MONOCYTES # BLD AUTO: 2.4 10E3/UL (ref 0–1.3)
MONOCYTES NFR BLD AUTO: 19 %
NEUTROPHILS # BLD AUTO: 7 10E3/UL (ref 1.6–8.3)
NEUTROPHILS NFR BLD AUTO: 55 %
NRBC # BLD AUTO: 0 10E3/UL
NRBC BLD AUTO-RTO: 0 /100
PLATELET # BLD AUTO: 448 10E3/UL (ref 150–450)
POTASSIUM BLD-SCNC: 3.9 MMOL/L (ref 3.5–5.1)
PROT SERPL-MCNC: 6.2 G/DL (ref 6.4–8.9)
RBC # BLD AUTO: 3.59 10E6/UL (ref 3.8–5.2)
SODIUM SERPL-SCNC: 138 MMOL/L (ref 134–144)
TSH SERPL DL<=0.005 MIU/L-ACNC: 3.81 MU/L (ref 0.4–4)
WBC # BLD AUTO: 12.6 10E3/UL (ref 4–11)

## 2022-04-14 PROCEDURE — 84443 ASSAY THYROID STIM HORMONE: CPT | Performed by: NURSE PRACTITIONER

## 2022-04-14 PROCEDURE — 85025 COMPLETE CBC W/AUTO DIFF WBC: CPT | Performed by: NURSE PRACTITIONER

## 2022-04-14 PROCEDURE — 83036 HEMOGLOBIN GLYCOSYLATED A1C: CPT | Performed by: NURSE PRACTITIONER

## 2022-04-14 PROCEDURE — 80053 COMPREHEN METABOLIC PANEL: CPT | Performed by: NURSE PRACTITIONER

## 2022-05-11 ENCOUNTER — LAB REQUISITION (OUTPATIENT)
Dept: LAB | Facility: OTHER | Age: 67
End: 2022-05-11
Payer: COMMERCIAL

## 2022-05-11 DIAGNOSIS — E11.319 TYPE 2 DIABETES MELLITUS WITH UNSPECIFIED DIABETIC RETINOPATHY WITHOUT MACULAR EDEMA (H): ICD-10-CM

## 2022-05-11 LAB
ALBUMIN SERPL-MCNC: 2.9 G/DL (ref 3.5–5.7)
ALP SERPL-CCNC: 125 U/L (ref 34–104)
ALT SERPL W P-5'-P-CCNC: 25 U/L (ref 7–52)
ANION GAP SERPL CALCULATED.3IONS-SCNC: 11 MMOL/L (ref 3–14)
AST SERPL W P-5'-P-CCNC: 24 U/L (ref 13–39)
BASOPHILS # BLD AUTO: 0.1 10E3/UL (ref 0–0.2)
BASOPHILS NFR BLD AUTO: 1 %
BILIRUB SERPL-MCNC: 0.5 MG/DL (ref 0.3–1)
BUN SERPL-MCNC: 10 MG/DL (ref 7–25)
CALCIUM SERPL-MCNC: 8.5 MG/DL (ref 8.6–10.3)
CHLORIDE BLD-SCNC: 96 MMOL/L (ref 98–107)
CO2 SERPL-SCNC: 30 MMOL/L (ref 21–31)
CREAT SERPL-MCNC: 2.05 MG/DL (ref 0.6–1.2)
EOSINOPHIL # BLD AUTO: 0.1 10E3/UL (ref 0–0.7)
EOSINOPHIL NFR BLD AUTO: 1 %
ERYTHROCYTE [DISTWIDTH] IN BLOOD BY AUTOMATED COUNT: 18.2 % (ref 10–15)
GFR SERPL CREATININE-BSD FRML MDRD: 26 ML/MIN/1.73M2
GLUCOSE BLD-MCNC: 111 MG/DL (ref 70–105)
HCT VFR BLD AUTO: 28.7 % (ref 35–47)
HGB BLD-MCNC: 8.9 G/DL (ref 11.7–15.7)
IMM GRANULOCYTES # BLD: 0.2 10E3/UL
IMM GRANULOCYTES NFR BLD: 1 %
LYMPHOCYTES # BLD AUTO: 1.5 10E3/UL (ref 0.8–5.3)
LYMPHOCYTES NFR BLD AUTO: 9 %
MCH RBC QN AUTO: 28.4 PG (ref 26.5–33)
MCHC RBC AUTO-ENTMCNC: 31 G/DL (ref 31.5–36.5)
MCV RBC AUTO: 92 FL (ref 78–100)
MONOCYTES # BLD AUTO: 3 10E3/UL (ref 0–1.3)
MONOCYTES NFR BLD AUTO: 18 %
NEUTROPHILS # BLD AUTO: 12.1 10E3/UL (ref 1.6–8.3)
NEUTROPHILS NFR BLD AUTO: 70 %
NRBC # BLD AUTO: 0 10E3/UL
NRBC BLD AUTO-RTO: 0 /100
PLATELET # BLD AUTO: 366 10E3/UL (ref 150–450)
POTASSIUM BLD-SCNC: 3.4 MMOL/L (ref 3.5–5.1)
PROT SERPL-MCNC: 5.6 G/DL (ref 6.4–8.9)
RBC # BLD AUTO: 3.13 10E6/UL (ref 3.8–5.2)
SODIUM SERPL-SCNC: 137 MMOL/L (ref 134–144)
WBC # BLD AUTO: 17 10E3/UL (ref 4–11)

## 2022-05-11 PROCEDURE — 80053 COMPREHEN METABOLIC PANEL: CPT | Performed by: NURSE PRACTITIONER

## 2022-05-11 PROCEDURE — 85025 COMPLETE CBC W/AUTO DIFF WBC: CPT | Performed by: NURSE PRACTITIONER

## 2022-05-26 ENCOUNTER — LAB REQUISITION (OUTPATIENT)
Dept: LAB | Facility: OTHER | Age: 67
End: 2022-05-26

## 2022-05-26 DIAGNOSIS — E11.22 TYPE 2 DIABETES MELLITUS WITH DIABETIC CHRONIC KIDNEY DISEASE (H): ICD-10-CM

## 2022-05-26 DIAGNOSIS — D63.1 ANEMIA IN CHRONIC KIDNEY DISEASE (CODE): ICD-10-CM

## 2022-05-26 LAB — MAGNESIUM SERPL-MCNC: 1.9 MG/DL (ref 1.9–2.7)

## 2022-05-26 PROCEDURE — 83735 ASSAY OF MAGNESIUM: CPT | Performed by: NURSE PRACTITIONER

## 2022-11-14 NOTE — ED NOTES
Pt states dialysis appointment is this morning. Usually leaves to dialysis at 0700 by [facility] transport.

## 2022-11-14 NOTE — DISCHARGE INSTRUCTIONS
I recommend that you use Zofran as needed for nausea.    Thank you for choosing our Emergency Department for your care.     You may receive a phone call or letter for a survey about your care in our ED.  Please complete this as this is how we improve care for our patients.     If you have any questions after leaving the ED you can call or text me on my cell phone at [phone] and I will get back to you at some point. This does not mean that I am on call and if you are not doing well please return to the ED.     Sincerely,    Dr Chino Julian M.D.

## 2022-11-14 NOTE — ED PROVIDER NOTES
History     Chief Complaint   Patient presents with     Shortness of Breath     The history is provided by the patient, the EMS personnel and the nursing home.     Lucila Willett is a 67 year old female who had some shortness of breath earlier but now is feeling much better. She does not have chills.     Per EMS: she got a Duo Neb and Zofran en route.     Per [facility]: Lucila had shortness of breath this afternoon and they increased her oxygen to 2.5 L by NC. Tonight she has decreased breath sounds in her lung bases, increased work of breathing and diaphoresis. She had some nausea and dry heaves as well. VS P 95.4  F, /88, P 92, RR 28, oxygen sats 88% on 2.5 L oxygen.     She is a dialysis patient with runs on M,W,F. She is DNR/ DNI.    Allergies:  Allergies   Allergen Reactions     Zosyn Hives and Rash     Given IV benadryl, and Pepcid in outside facility.      Cetirizine      Other reaction(s): *Unknown - Pt Doesn't Remember     Pregabalin      Other reaction(s): Other - Describe In Comment Field  Floaters in eyes.      Latex Rash     Patient states she gets a rash     Rosiglitazone Rash     Patient states she gets a rash     Zosyn [Piperacillin-Tazobactam In D5w] Rash       Problem List:    Patient Active Problem List    Diagnosis Date Noted     Cellulitis 08/21/2020     Priority: Medium     Leukocytosis 12/19/2016     Priority: Medium     Hyponatremia 06/10/2016     Priority: Medium     Cellulitis of left lower extremity 06/09/2016     Priority: Medium     Severe sepsis (H) 06/09/2016     Priority: Medium     Foot ulcer (H) 01/11/2016     Priority: Medium     Diastolic heart failure (H) 07/01/2015     Priority: Medium     Chronic pain disorder 06/30/2015     Priority: Medium     Overview:   Overview:   [facility] Agreement for Opioid Treatment  PHYSICIAN:  Gauri Grimm MD  PHARMACY:  [facility] in [place], [place]  PHARMACY PHONE:  [phone]    Last two urine drug screens have been negative -  on 1/7/13 it had been two days since she took lortab.  Negative for norco aug 2015       Type 2 diabetes mellitus (H) 06/11/2013     Priority: Medium     Overview:   Overview:   LDL 94 - on crestor  Off valsartan because of worsening renal function ? Hypotension (10/2014: will retry low dose lisinopril)  On ASA  Right diabetic retinopathy on right 2/2014       Hypokalemia 09/13/2012     Priority: Medium     Anemia 09/12/2012     Priority: Medium     Cellulitis and abscess of leg, except foot 09/12/2012     Priority: Medium     Diabetes mellitus, type II (H) 09/12/2012     Priority: Medium     Overview:   a system change updated this record. This will not affect patient care or billing. This comment can be deleted.       Hypertensive heart failure with end stage renal disease (H) 09/12/2012     Priority: Medium     Morbid obesity (H) 09/12/2012     Priority: Medium     Chronic kidney disease, stage IV (severe) (H) 03/23/2012     Priority: Medium     Overview:   Overview:   IMO Update 10/11       Atherosclerosis of coronary artery 03/17/2010     Priority: Medium     Overview:   Overview:   IMO Update       Gout 02/29/2008     Priority: Medium     Essential hypertension 02/27/2008     Priority: Medium     Hypothyroidism 02/27/2008     Priority: Medium     Overview:   Overview:   IMO Update 10/11       Sleep apnea 02/27/2008     Priority: Medium     Overview:   Overview:   10/2012: cpap did not work - trying BIPAP          Past Medical History:    Past Medical History:   Diagnosis Date     Atherosclerotic heart disease of native coronary artery without angina pectoris      Cellulitis      Chronic kidney disease      Diastolic congestive heart failure (H)      Hypothyroidism      Major depressive disorder, single episode      Obesity      Personal history of other medical treatment (CODE)      Sleep apnea      Type 2 diabetes mellitus without complications (H)        Past Surgical History:    Past Surgical History:    Procedure Laterality Date     CHOLECYSTECTOMY      No Comments Provided     OTHER SURGICAL HISTORY      607887,OTHER     OTHER SURGICAL HISTORY      19940.0,[place] SPINE FUSION ANTER 3 SGMTS     OTHER SURGICAL HISTORY      LOC-1006.04,CABG     OTHER SURGICAL HISTORY      486154,ENDOSCOPY GI       Family History:    No family history on file.    Social History:  Marital Status:   [4]  Social History     Tobacco Use     Smoking status: Former     Packs/day: 2.00     Years: 20.00     Pack years: 40.00     Types: Cigarettes     Quit date: 1997     Years since quittin.1     Smokeless tobacco: Never   Substance Use Topics     Alcohol use: Yes     Alcohol/week: 0.0 standard drinks     Comment: Alcoholic Drinks/day: Occasionally     Drug use: Unknown     Types: Other     Comment: Drug use: No        Medications:    acetaminophen (TYLENOL) 325 MG tablet  allopurinol (ZYLOPRIM) 100 MG tablet  alum & mag hydroxide-simethicone (MAALOX) 200-200-20 MG/5ML SUSP suspension  aspirin (ASA) 81 MG chewable tablet  atorvastatin (LIPITOR) 80 MG tablet  B Complex-C-Folic Acid (DIALYVITE 800 PO)  buPROPion (WELLBUTRIN XL) 150 MG 24 hr tablet  clopidogrel (PLAVIX) 75 MG tablet  diclofenac (VOLTAREN) 1 % topical gel  FLUoxetine (PROZAC) 20 MG capsule  fluticasone (FLONASE) 50 MCG/ACT spray  gabapentin (NEURONTIN) 100 MG capsule  guaiFENesin (ROBITUSSIN) 20 mg/mL SOLN solution  hypromellose (GENTEAL) 0.3 % SOLN ophthalmic solution  insulin aspart (NOVOLOG PEN) 100 UNIT/ML injection  insulin aspart (NOVOLOG PEN) 100 UNIT/ML pen  insulin detemir (LEVEMIR PEN) 100 UNIT/ML pen  levothyroxine (SYNTHROID/LEVOTHROID) 200 MCG tablet  levothyroxine (SYNTHROID/LEVOTHROID) 25 MCG tablet  lidocaine-prilocaine (EMLA) 2.5-2.5 % external cream  loratadine (CLARITIN) 10 MG tablet  magnesium hydroxide (MILK OF MAGNESIA) 400 MG/5ML suspension  Menthol (COUGH DROPS) 5.8 MG LOZG  miconazole (MICATIN) 2 % cream  midodrine (PROAMATINE) 10 MG  "tablet  midodrine (PROAMATINE) 10 MG tablet  montelukast (SINGULAIR) 10 MG tablet  Needle, Disp, 31G X 5/16\" MISC  nitroGLYcerin (NITROSTAT) 0.4 MG sublingual tablet  omeprazole (PRILOSEC) 20 MG DR capsule  ondansetron (ZOFRAN ODT) 4 MG ODT tab  polyethylene glycol (MIRALAX) 17 GM/Dose powder  senna-docusate (SENOKOT-S/PERICOLACE) 8.6-50 MG tablet  sevelamer carbonate (RENVELA) 800 MG tablet  vitamin D3 (CHOLECALCIFEROL) 50 mcg (2000 units) tablet  blood glucose (NO BRAND SPECIFIED) lancets standard  blood glucose monitoring (NO BRAND SPECIFIED) test strip  nystatin (MYCOSTATIN) 268958 UNIT/GM external powder          Review of Systems   Respiratory: Positive for shortness of breath.    Gastrointestinal: Positive for nausea.   All other systems reviewed and are negative.      Physical Exam   BP: (!) 150/84  Pulse: 100  Temp: 98.1  F (36.7  C)  Resp: 24  Height: 162.6 cm (5' 4\")  Weight: 101.6 kg (224 lb)  SpO2: 91 %      Physical Exam  Vitals and nursing note reviewed.   Constitutional:       General: She is not in acute distress.     Appearance: She is well-developed. She is not ill-appearing, toxic-appearing or diaphoretic.   Cardiovascular:      Rate and Rhythm: Normal rate and regular rhythm.      Pulses: Normal pulses.      Heart sounds: Normal heart sounds.   Pulmonary:      Effort: Pulmonary effort is normal. No respiratory distress.      Breath sounds: Normal breath sounds.   Abdominal:      General: Bowel sounds are normal.      Palpations: Abdomen is soft.   Musculoskeletal:      Right lower leg: No tenderness. No edema.      Left lower leg: No tenderness. No edema.   Skin:     General: Skin is warm and dry.   Neurological:      General: No focal deficit present.      Mental Status: She is alert and oriented to person, place, and time.         EKG: NSR, left axis, rate 99, pulmonary disease pattern    Results for orders placed or performed during the hospital encounter of 11/14/22 (from the past 24 " hour(s))   Symptomatic; Unknown Influenza A/B & SARS-CoV2 (COVID-19) Virus PCR Multiplex Nose    Specimen: Nose; Swab   Result Value Ref Range    Influenza A PCR Negative Negative    Influenza B PCR Negative Negative    RSV PCR Negative Negative    SARS CoV2 PCR Negative Negative    Narrative    Testing was performed using the Xpert Xpress CoV2/Flu/RSV Assay on the [employer] GeneXpert Instrument. This test should be ordered for the detection of SARS-CoV-2 and influenza viruses in individuals who meet clinical and/or epidemiological criteria. Test performance is unknown in asymptomatic patients. This test is for in vitro diagnostic use under the FDA EUA for laboratories certified under CLIA to perform high or moderate complexity testing. This test has not been FDA cleared or approved. A negative result does not rule out the presence of PCR inhibitors in the specimen or target RNA in concentration below the limit of detection for the assay. If only one viral target is positive but coinfection with multiple targets is suspected, the sample should be re-tested with another FDA cleared, approved, or authorized test, if coinfection would change clinical management. This test was validated by the [facility]. These laboratories are certified under the Clinical Laboratory Improvement Amendments of 1988 (CLIA-88) as qualified to perform high complexity laboratory testing.   CBC with platelets differential    Narrative    The following orders were created for panel order CBC with platelets differential.  Procedure                               Abnormality         Status                     ---------                               -----------         ------                     CBC with platelets and d...[[id number]]  Abnormal            Final result                 Please view results for these tests on the individual orders.   Basic metabolic panel   Result Value Ref Range    Sodium 137 136 - 145 mmol/L     Potassium 4.8 3.4 - 5.3 mmol/L    Chloride 98 98 - 107 mmol/L    Carbon Dioxide (CO2) 24 22 - 29 mmol/L    Anion Gap 15 7 - 15 mmol/L    Urea Nitrogen 47.3 (H) 8.0 - 23.0 mg/dL    Creatinine 4.32 (H) 0.51 - 0.95 mg/dL    Calcium 9.2 8.8 - 10.2 mg/dL    Glucose 222 (H) 70 - 99 mg/dL    GFR Estimate 11 (L) >60 mL/min/1.73m2   Extra Tube    Narrative    The following orders were created for panel order Extra Tube.  Procedure                               Abnormality         Status                     ---------                               -----------         ------                     Extra Blue Top Tube[[id number]]                              Final result               Extra Serum Separator Tu...[[id number]]                      Final result                 Please view results for these tests on the individual orders.   Extra Blue Top Tube   Result Value Ref Range    Hold Specimen JIC    Extra Serum Separator Tube (SST)   Result Value Ref Range    Hold Specimen JIC    CBC with platelets and differential   Result Value Ref Range    WBC Count 12.5 (H) 4.0 - 11.0 10e3/uL    RBC Count 3.12 (L) 3.80 - 5.20 10e6/uL    Hemoglobin 9.5 (L) 11.7 - 15.7 g/dL    Hematocrit 30.2 (L) 35.0 - 47.0 %    MCV 97 78 - 100 fL    MCH 30.4 26.5 - 33.0 pg    MCHC 31.5 31.5 - 36.5 g/dL    RDW 15.6 (H) 10.0 - 15.0 %    Platelet Count 341 150 - 450 10e3/uL    % Neutrophils 68 %    % Lymphocytes 13 %    % Monocytes 13 %    % Eosinophils 4 %    % Basophils 1 %    % Immature Granulocytes 1 %    NRBCs per 100 WBC 0 <1 /100    Absolute Neutrophils 8.6 (H) 1.6 - 8.3 10e3/uL    Absolute Lymphocytes 1.6 0.8 - 5.3 10e3/uL    Absolute Monocytes 1.6 (H) 0.0 - 1.3 10e3/uL    Absolute Eosinophils 0.5 0.0 - 0.7 10e3/uL    Absolute Basophils 0.1 0.0 - 0.2 10e3/uL    Absolute Immature Granulocytes 0.2 <=0.4 10e3/uL    Absolute NRBCs 0.0 10e3/uL   Lactic Acid STAT   Result Value Ref Range    Lactic Acid 1.0 0.7 - 2.0 mmol/L   Nt probnp inpatient (BNP)   Result  "Value Ref Range    N terminal Pro BNP Inpatient 37,702 (H) 0 - 900 pg/mL   XR Chest Port 1 View    Narrative    PROCEDURE INFORMATION:   Exam: XR Chest   Exam date and time: 11/14/2022 3:11 AM   Age: 67 years old   Clinical indication: Cough and shortness of breath     TECHNIQUE:   Imaging protocol: Radiologic exam of the chest.   Views: 1 view.     COMPARISON:   CR XR CHEST PORT 1 VIEW 10/1/2021 8:10 PM     FINDINGS:   Lungs: Pulmonary vascular congestion. Hyperaeration. Right basilar atelectasis.   Pleural spaces: Unremarkable. No pleural effusion. No pneumothorax.   Heart/Mediastinum: Stable size and configuration.   Bones/joints: Unchanged.       Impression    IMPRESSION:   Mild pulmonary vascular congestion.     THIS DOCUMENT HAS BEEN ELECTRONICALLY SIGNED BY ANAMIKA GREY MD       Medications   ipratropium - albuterol 0.5 mg/2.5 mg/3 mL (DUONEB) neb solution 3 mL (has no administration in time range)       Assessments & Plan (with Medical Decision Making)  Lucila Willett is a 67 year old female who had some shortness of breath earlier but now is feeling much better. She does not have chills. Per EMS: she got a Duo Neb and Zofran en route. Per [facility]: Lucila had shortness of breath this afternoon and they increased her oxygen to 2.5 L by NC. Tonight she has decreased breath sounds in her lung bases, increased work of breathing and diaphoresis. She had some nausea and dry heaves as well. VS P 95.4  F, /88, P 92, RR 28, oxygen sats 88% on 2.5 L oxygen. She is a dialysis patient with runs on M,W,F. She is DNR/ DNI.  VS in the ED /61   Pulse 82   Temp 98.1  F (36.7  C) (Oral)   Resp 21   Ht 1.626 m (5' 4\")   Wt 101.6 kg (224 lb)   SpO2 96%   BMI 38.45 kg/m    Exam shows normal heart and lung sounds. She feels pretty good here. EKG stable.  Labs show CBC with WBC 12,500, hgb 9.5 (improved), BMP with Cr 4.32 (on dialysis), glucose 222, BNP >30,000, lactic acid 1.0, 4 Plex negative. Chest " xray implies pulmonary congestion.  At 4:05 AM she has been here an hour and feels better. She has a run today and they pick her up at 7 AM. Apparently she was too nauseous for a neb at [facility] but did well in the ambulance after Zofran. I can get her home with Zofran. I did send a note with her for new meds (Zofran) and oxygen up to 3 L. I sent a note with her to take to dialysis saying her BNP was >30,000 (normal <125).     I have reviewed the nursing notes.    I have reviewed the findings, diagnosis, plan and need for follow up with the patient.       Discharge Medication List as of 11/14/2022  5:23 AM      START taking these medications    Details   ondansetron (ZOFRAN ODT) 4 MG ODT tab Take 1 tablet (4 mg) by mouth every 8 hours as needed for nausea, Disp-5 tablet, R-0, [facility]eds             Final diagnoses:   Shortness of breath       11/14/2022   [facility]     Daniel Julian MD  11/14/22 0522       Daniel Julian MD  11/14/22 0675

## 2023-01-01 ENCOUNTER — HOSPITAL ENCOUNTER (EMERGENCY)
Facility: OTHER | Age: 68
Discharge: NURSING, REHAB OR LONG-TERM CARE | End: 2023-08-19
Attending: FAMILY MEDICINE | Admitting: FAMILY MEDICINE
Payer: COMMERCIAL

## 2023-01-01 ENCOUNTER — APPOINTMENT (OUTPATIENT)
Dept: GENERAL RADIOLOGY | Facility: OTHER | Age: 68
End: 2023-01-01
Attending: FAMILY MEDICINE
Payer: COMMERCIAL

## 2023-01-01 ENCOUNTER — HOSPITAL ENCOUNTER (EMERGENCY)
Facility: OTHER | Age: 68
Discharge: NURSING, REHAB OR LONG-TERM CARE | End: 2023-03-24
Attending: EMERGENCY MEDICINE | Admitting: EMERGENCY MEDICINE
Payer: COMMERCIAL

## 2023-01-01 ENCOUNTER — LAB REQUISITION (OUTPATIENT)
Dept: LAB | Facility: OTHER | Age: 68
End: 2023-01-01

## 2023-01-01 ENCOUNTER — RESULTS ONLY (OUTPATIENT)
Dept: EMERGENCY MEDICINE | Facility: OTHER | Age: 68
End: 2023-01-01

## 2023-01-01 ENCOUNTER — APPOINTMENT (OUTPATIENT)
Dept: CT IMAGING | Facility: OTHER | Age: 68
End: 2023-01-01
Attending: FAMILY MEDICINE
Payer: COMMERCIAL

## 2023-01-01 ENCOUNTER — DOCUMENTATION ONLY (OUTPATIENT)
Dept: OTHER | Facility: CLINIC | Age: 68
End: 2023-01-01
Payer: COMMERCIAL

## 2023-01-01 ENCOUNTER — HOSPITAL ENCOUNTER (EMERGENCY)
Facility: OTHER | Age: 68
Discharge: NURSING, REHAB OR LONG-TERM CARE | End: 2023-03-26
Attending: EMERGENCY MEDICINE | Admitting: EMERGENCY MEDICINE
Payer: COMMERCIAL

## 2023-01-01 ENCOUNTER — LAB REQUISITION (OUTPATIENT)
Dept: LAB | Facility: OTHER | Age: 68
End: 2023-01-01
Payer: COMMERCIAL

## 2023-01-01 ENCOUNTER — HOSPITAL ENCOUNTER (EMERGENCY)
Facility: OTHER | Age: 68
Discharge: ANOTHER HOSPITAL | End: 2023-09-14
Attending: FAMILY MEDICINE | Admitting: FAMILY MEDICINE
Payer: COMMERCIAL

## 2023-01-01 ENCOUNTER — HOSPITAL ENCOUNTER (EMERGENCY)
Facility: OTHER | Age: 68
Discharge: ANOTHER HOSPITAL | End: 2023-02-26
Attending: FAMILY MEDICINE | Admitting: FAMILY MEDICINE
Payer: COMMERCIAL

## 2023-01-01 VITALS
OXYGEN SATURATION: 98 % | SYSTOLIC BLOOD PRESSURE: 114 MMHG | WEIGHT: 250 LBS | HEART RATE: 81 BPM | TEMPERATURE: 98.7 F | BODY MASS INDEX: 42.91 KG/M2 | DIASTOLIC BLOOD PRESSURE: 68 MMHG | RESPIRATION RATE: 18 BRPM

## 2023-01-01 VITALS
HEART RATE: 65 BPM | SYSTOLIC BLOOD PRESSURE: 142 MMHG | RESPIRATION RATE: 24 BRPM | DIASTOLIC BLOOD PRESSURE: 59 MMHG | TEMPERATURE: 97 F | OXYGEN SATURATION: 95 %

## 2023-01-01 VITALS
DIASTOLIC BLOOD PRESSURE: 43 MMHG | BODY MASS INDEX: 38.07 KG/M2 | SYSTOLIC BLOOD PRESSURE: 116 MMHG | RESPIRATION RATE: 16 BRPM | OXYGEN SATURATION: 98 % | WEIGHT: 223 LBS | HEART RATE: 84 BPM | TEMPERATURE: 97.6 F | HEIGHT: 64 IN

## 2023-01-01 VITALS
OXYGEN SATURATION: 99 % | DIASTOLIC BLOOD PRESSURE: 64 MMHG | BODY MASS INDEX: 39.27 KG/M2 | SYSTOLIC BLOOD PRESSURE: 122 MMHG | WEIGHT: 230 LBS | RESPIRATION RATE: 27 BRPM | HEIGHT: 64 IN | HEART RATE: 77 BPM | TEMPERATURE: 100.7 F

## 2023-01-01 VITALS
DIASTOLIC BLOOD PRESSURE: 54 MMHG | HEIGHT: 64 IN | WEIGHT: 249.3 LBS | TEMPERATURE: 96.3 F | SYSTOLIC BLOOD PRESSURE: 115 MMHG | OXYGEN SATURATION: 98 % | RESPIRATION RATE: 22 BRPM | BODY MASS INDEX: 42.56 KG/M2 | HEART RATE: 56 BPM

## 2023-01-01 DIAGNOSIS — R30.0 DYSURIA: ICD-10-CM

## 2023-01-01 DIAGNOSIS — E87.5 HYPERKALEMIA: ICD-10-CM

## 2023-01-01 DIAGNOSIS — I49.8 BIGEMINY: ICD-10-CM

## 2023-01-01 DIAGNOSIS — I10 ESSENTIAL HYPERTENSION: ICD-10-CM

## 2023-01-01 DIAGNOSIS — R50.9 FEVER, UNSPECIFIED FEVER CAUSE: ICD-10-CM

## 2023-01-01 DIAGNOSIS — I50.30 DIASTOLIC HEART FAILURE, UNSPECIFIED HF CHRONICITY (H): ICD-10-CM

## 2023-01-01 DIAGNOSIS — I21.3 ST ELEVATION MYOCARDIAL INFARCTION (STEMI), UNSPECIFIED ARTERY (H): ICD-10-CM

## 2023-01-01 DIAGNOSIS — N18.4 CHRONIC KIDNEY DISEASE, STAGE IV (SEVERE) (H): ICD-10-CM

## 2023-01-01 DIAGNOSIS — N12 PYELONEPHRITIS: ICD-10-CM

## 2023-01-01 DIAGNOSIS — E11.22 TYPE 2 DIABETES MELLITUS WITH DIABETIC CHRONIC KIDNEY DISEASE (H): ICD-10-CM

## 2023-01-01 DIAGNOSIS — R50.9 FEVER IN ADULT: ICD-10-CM

## 2023-01-01 DIAGNOSIS — E11.59 TYPE 2 DIABETES MELLITUS WITH OTHER CIRCULATORY COMPLICATION, UNSPECIFIED WHETHER LONG TERM INSULIN USE (H): ICD-10-CM

## 2023-01-01 DIAGNOSIS — Z99.2 DEPENDENCE ON RENAL DIALYSIS (H): ICD-10-CM

## 2023-01-01 DIAGNOSIS — N18.6 ESRD NEEDING DIALYSIS (H): ICD-10-CM

## 2023-01-01 DIAGNOSIS — R31.0 GROSS HEMATURIA: ICD-10-CM

## 2023-01-01 DIAGNOSIS — Z99.2 ESRD NEEDING DIALYSIS (H): ICD-10-CM

## 2023-01-01 DIAGNOSIS — R00.2 PALPITATIONS: ICD-10-CM

## 2023-01-01 LAB
ALBUMIN SERPL BCG-MCNC: 3.7 G/DL (ref 3.5–5.2)
ALBUMIN UR-MCNC: 100 MG/DL
ALBUMIN UR-MCNC: 600 MG/DL
ALBUMIN UR-MCNC: ABNORMAL G/DL
ALLEN'S TEST: NO
ALP SERPL-CCNC: 153 U/L (ref 35–104)
ALP SERPL-CCNC: 158 U/L (ref 35–104)
ALP SERPL-CCNC: 170 U/L (ref 35–104)
ALT SERPL W P-5'-P-CCNC: 12 U/L (ref 10–35)
ALT SERPL W P-5'-P-CCNC: 15 U/L (ref 0–50)
ALT SERPL W P-5'-P-CCNC: 17 U/L (ref 0–50)
ANION GAP SERPL CALCULATED.3IONS-SCNC: 11 MMOL/L (ref 7–15)
ANION GAP SERPL CALCULATED.3IONS-SCNC: 12 MMOL/L (ref 7–15)
ANION GAP SERPL CALCULATED.3IONS-SCNC: 13 MMOL/L (ref 7–15)
ANION GAP SERPL CALCULATED.3IONS-SCNC: 14 MMOL/L (ref 7–15)
ANION GAP SERPL CALCULATED.3IONS-SCNC: 15 MMOL/L (ref 7–15)
APPEARANCE UR: ABNORMAL
AST SERPL W P-5'-P-CCNC: 14 U/L (ref 10–35)
AST SERPL W P-5'-P-CCNC: 17 U/L (ref 0–45)
AST SERPL W P-5'-P-CCNC: 18 U/L (ref 0–45)
ATRIAL RATE - MUSE: 58 BPM
ATRIAL RATE - MUSE: 61 BPM
ATRIAL RATE - MUSE: 62 BPM
ATRIAL RATE - MUSE: 84 BPM
BACTERIA #/AREA URNS HPF: ABNORMAL /HPF
BACTERIA BLD CULT: NO GROWTH
BACTERIA UR CULT: ABNORMAL
BACTERIA UR CULT: ABNORMAL
BASE EXCESS BLDA CALC-SCNC: 3 MMOL/L (ref -9–1.8)
BASOPHILS # BLD AUTO: 0.1 10E3/UL (ref 0–0.2)
BASOPHILS NFR BLD AUTO: 0 %
BASOPHILS NFR BLD AUTO: 1 %
BASOPHILS NFR BLD AUTO: 1 %
BASOPHILS NFR BLD AUTO: 2 %
BASOPHILS NFR BLD AUTO: 2 %
BILIRUB SERPL-MCNC: 0.3 MG/DL
BILIRUB SERPL-MCNC: 0.4 MG/DL
BILIRUB SERPL-MCNC: 0.5 MG/DL
BILIRUB UR QL STRIP: ABNORMAL
BILIRUB UR QL STRIP: NEGATIVE
BILIRUB UR QL STRIP: NEGATIVE
BUN SERPL-MCNC: 23.2 MG/DL (ref 8–23)
BUN SERPL-MCNC: 35.3 MG/DL (ref 8–23)
BUN SERPL-MCNC: 36 MG/DL (ref 8–23)
BUN SERPL-MCNC: 41.5 MG/DL (ref 8–23)
BUN SERPL-MCNC: 58.5 MG/DL (ref 8–23)
CALCIUM SERPL-MCNC: 8.9 MG/DL (ref 8.8–10.2)
CALCIUM SERPL-MCNC: 9.2 MG/DL (ref 8.8–10.2)
CALCIUM SERPL-MCNC: 9.3 MG/DL (ref 8.8–10.2)
CALCIUM SERPL-MCNC: 9.7 MG/DL (ref 8.8–10.2)
CALCIUM SERPL-MCNC: 9.8 MG/DL (ref 8.8–10.2)
CHLORIDE SERPL-SCNC: 100 MMOL/L (ref 98–107)
CHLORIDE SERPL-SCNC: 91 MMOL/L (ref 98–107)
CHLORIDE SERPL-SCNC: 91 MMOL/L (ref 98–107)
CHLORIDE SERPL-SCNC: 99 MMOL/L (ref 98–107)
CHLORIDE SERPL-SCNC: 99 MMOL/L (ref 98–107)
COLOR UR AUTO: ABNORMAL
CREAT SERPL-MCNC: 3.51 MG/DL (ref 0.51–0.95)
CREAT SERPL-MCNC: 4.09 MG/DL (ref 0.51–0.95)
CREAT SERPL-MCNC: 4.74 MG/DL (ref 0.51–0.95)
CREAT SERPL-MCNC: 5.07 MG/DL (ref 0.51–0.95)
CREAT SERPL-MCNC: 6.27 MG/DL (ref 0.51–0.95)
DEPRECATED HCO3 PLAS-SCNC: 23 MMOL/L (ref 22–29)
DEPRECATED HCO3 PLAS-SCNC: 25 MMOL/L (ref 22–29)
DEPRECATED HCO3 PLAS-SCNC: 25 MMOL/L (ref 22–29)
DEPRECATED HCO3 PLAS-SCNC: 27 MMOL/L (ref 22–29)
DEPRECATED HCO3 PLAS-SCNC: 28 MMOL/L (ref 22–29)
DIASTOLIC BLOOD PRESSURE - MUSE: NORMAL MMHG
EGFRCR SERPLBLD CKD-EPI 2021: 9 ML/MIN/1.73M2
EOSINOPHIL # BLD AUTO: 0 10E3/UL (ref 0–0.7)
EOSINOPHIL # BLD AUTO: 0.1 10E3/UL (ref 0–0.7)
EOSINOPHIL # BLD AUTO: 0.1 10E3/UL (ref 0–0.7)
EOSINOPHIL # BLD AUTO: 0.2 10E3/UL (ref 0–0.7)
EOSINOPHIL # BLD AUTO: 0.5 10E3/UL (ref 0–0.7)
EOSINOPHIL NFR BLD AUTO: 0 %
EOSINOPHIL NFR BLD AUTO: 0 %
EOSINOPHIL NFR BLD AUTO: 1 %
EOSINOPHIL NFR BLD AUTO: 3 %
EOSINOPHIL NFR BLD AUTO: 8 %
ERYTHROCYTE [DISTWIDTH] IN BLOOD BY AUTOMATED COUNT: 16.7 % (ref 10–15)
ERYTHROCYTE [DISTWIDTH] IN BLOOD BY AUTOMATED COUNT: 16.8 % (ref 10–15)
ERYTHROCYTE [DISTWIDTH] IN BLOOD BY AUTOMATED COUNT: 16.9 % (ref 10–15)
ERYTHROCYTE [DISTWIDTH] IN BLOOD BY AUTOMATED COUNT: 17.5 % (ref 10–15)
ERYTHROCYTE [DISTWIDTH] IN BLOOD BY AUTOMATED COUNT: 17.7 % (ref 10–15)
FLUAV RNA SPEC QL NAA+PROBE: NEGATIVE
FLUAV RNA SPEC QL NAA+PROBE: NEGATIVE
FLUBV RNA RESP QL NAA+PROBE: NEGATIVE
FLUBV RNA RESP QL NAA+PROBE: NEGATIVE
GFR SERPL CREATININE-BSD FRML MDRD: 11 ML/MIN/1.73M2
GFR SERPL CREATININE-BSD FRML MDRD: 13 ML/MIN/1.73M2
GFR SERPL CREATININE-BSD FRML MDRD: 7 ML/MIN/1.73M2
GFR SERPL CREATININE-BSD FRML MDRD: 9 ML/MIN/1.73M2
GLUCOSE SERPL-MCNC: 135 MG/DL (ref 70–99)
GLUCOSE SERPL-MCNC: 160 MG/DL (ref 70–99)
GLUCOSE SERPL-MCNC: 161 MG/DL (ref 70–99)
GLUCOSE SERPL-MCNC: 173 MG/DL (ref 70–99)
GLUCOSE SERPL-MCNC: 265 MG/DL (ref 70–99)
GLUCOSE UR STRIP-MCNC: ABNORMAL MG/DL
GLUCOSE UR STRIP-MCNC: NEGATIVE MG/DL
GLUCOSE UR STRIP-MCNC: NEGATIVE MG/DL
HBA1C MFR BLD: 5.3 % (ref 4–6.2)
HCO3 BLD-SCNC: 27 MMOL/L (ref 21–28)
HCT VFR BLD AUTO: 29.7 % (ref 35–47)
HCT VFR BLD AUTO: 32.1 % (ref 35–47)
HCT VFR BLD AUTO: 34.5 % (ref 35–47)
HCT VFR BLD AUTO: 34.8 % (ref 35–47)
HCT VFR BLD AUTO: 35.2 % (ref 35–47)
HEMOCCULT STL QL: NEGATIVE
HGB BLD-MCNC: 10.4 G/DL (ref 11.7–15.7)
HGB BLD-MCNC: 10.9 G/DL (ref 11.7–15.7)
HGB BLD-MCNC: 11.2 G/DL (ref 11.7–15.7)
HGB BLD-MCNC: 11.3 G/DL (ref 11.7–15.7)
HGB BLD-MCNC: 9.4 G/DL (ref 11.7–15.7)
HGB UR QL STRIP: ABNORMAL
HOLD SPECIMEN: NORMAL
IMM GRANULOCYTES # BLD: 0 10E3/UL
IMM GRANULOCYTES # BLD: 0.1 10E3/UL
IMM GRANULOCYTES # BLD: 0.1 10E3/UL
IMM GRANULOCYTES # BLD: 0.2 10E3/UL
IMM GRANULOCYTES # BLD: 0.3 10E3/UL
IMM GRANULOCYTES NFR BLD: 1 %
IMM GRANULOCYTES NFR BLD: 2 %
IMM GRANULOCYTES NFR BLD: 2 %
INTERPRETATION ECG - MUSE: NORMAL
KETONES UR STRIP-MCNC: ABNORMAL MG/DL
KETONES UR STRIP-MCNC: NEGATIVE MG/DL
KETONES UR STRIP-MCNC: NEGATIVE MG/DL
LACTATE SERPL-SCNC: 0.9 MMOL/L (ref 0.7–2)
LACTATE SERPL-SCNC: 1.3 MMOL/L (ref 0.7–2)
LEUKOCYTE ESTERASE UR QL STRIP: ABNORMAL
LYMPHOCYTES # BLD AUTO: 0.9 10E3/UL (ref 0.8–5.3)
LYMPHOCYTES # BLD AUTO: 0.9 10E3/UL (ref 0.8–5.3)
LYMPHOCYTES # BLD AUTO: 1.1 10E3/UL (ref 0.8–5.3)
LYMPHOCYTES # BLD AUTO: 1.4 10E3/UL (ref 0.8–5.3)
LYMPHOCYTES # BLD AUTO: 1.7 10E3/UL (ref 0.8–5.3)
LYMPHOCYTES NFR BLD AUTO: 16 %
LYMPHOCYTES NFR BLD AUTO: 28 %
LYMPHOCYTES NFR BLD AUTO: 6 %
LYMPHOCYTES NFR BLD AUTO: 7 %
LYMPHOCYTES NFR BLD AUTO: 7 %
MAGNESIUM SERPL-MCNC: 2.2 MG/DL (ref 1.7–2.3)
MCH RBC QN AUTO: 30.8 PG (ref 26.5–33)
MCH RBC QN AUTO: 31.4 PG (ref 26.5–33)
MCH RBC QN AUTO: 31.8 PG (ref 26.5–33)
MCH RBC QN AUTO: 32.4 PG (ref 26.5–33)
MCH RBC QN AUTO: 32.7 PG (ref 26.5–33)
MCHC RBC AUTO-ENTMCNC: 31.6 G/DL (ref 31.5–36.5)
MCHC RBC AUTO-ENTMCNC: 31.6 G/DL (ref 31.5–36.5)
MCHC RBC AUTO-ENTMCNC: 32.1 G/DL (ref 31.5–36.5)
MCHC RBC AUTO-ENTMCNC: 32.2 G/DL (ref 31.5–36.5)
MCHC RBC AUTO-ENTMCNC: 32.4 G/DL (ref 31.5–36.5)
MCV RBC AUTO: 100 FL (ref 78–100)
MCV RBC AUTO: 101 FL (ref 78–100)
MCV RBC AUTO: 102 FL (ref 78–100)
MCV RBC AUTO: 97 FL (ref 78–100)
MCV RBC AUTO: 98 FL (ref 78–100)
MONOCYTES # BLD AUTO: 1.1 10E3/UL (ref 0–1.3)
MONOCYTES # BLD AUTO: 1.1 10E3/UL (ref 0–1.3)
MONOCYTES # BLD AUTO: 2.1 10E3/UL (ref 0–1.3)
MONOCYTES # BLD AUTO: 2.8 10E3/UL (ref 0–1.3)
MONOCYTES # BLD AUTO: 2.9 10E3/UL (ref 0–1.3)
MONOCYTES NFR BLD AUTO: 13 %
MONOCYTES NFR BLD AUTO: 15 %
MONOCYTES NFR BLD AUTO: 16 %
MONOCYTES NFR BLD AUTO: 18 %
MONOCYTES NFR BLD AUTO: 22 %
MUCOUS THREADS #/AREA URNS LPF: PRESENT /LPF
MUCOUS THREADS #/AREA URNS LPF: PRESENT /LPF
NEUTROPHILS # BLD AUTO: 14.5 10E3/UL (ref 1.6–8.3)
NEUTROPHILS # BLD AUTO: 2.7 10E3/UL (ref 1.6–8.3)
NEUTROPHILS # BLD AUTO: 5.5 10E3/UL (ref 1.6–8.3)
NEUTROPHILS # BLD AUTO: 9 10E3/UL (ref 1.6–8.3)
NEUTROPHILS # BLD AUTO: 9.8 10E3/UL (ref 1.6–8.3)
NEUTROPHILS NFR BLD AUTO: 43 %
NEUTROPHILS NFR BLD AUTO: 64 %
NEUTROPHILS NFR BLD AUTO: 69 %
NEUTROPHILS NFR BLD AUTO: 75 %
NEUTROPHILS NFR BLD AUTO: 76 %
NITRATE UR QL: ABNORMAL
NITRATE UR QL: NEGATIVE
NITRATE UR QL: NEGATIVE
NRBC # BLD AUTO: 0 10E3/UL
NRBC BLD AUTO-RTO: 0 /100
NT-PROBNP SERPL-MCNC: ABNORMAL PG/ML (ref 0–900)
O2/TOTAL GAS SETTING VFR VENT: 28 %
OXYHGB MFR BLD: 95 % (ref 92–100)
P AXIS - MUSE: 43 DEGREES
P AXIS - MUSE: 65 DEGREES
P AXIS - MUSE: 77 DEGREES
P AXIS - MUSE: 85 DEGREES
PCO2 BLD: 38 MM HG (ref 35–45)
PH BLD: 7.46 [PH] (ref 7.35–7.45)
PH UR STRIP: 6 [PH] (ref 5–9)
PH UR STRIP: 7.5 [PH] (ref 5–9)
PH UR STRIP: ABNORMAL [PH]
PLATELET # BLD AUTO: 225 10E3/UL (ref 150–450)
PLATELET # BLD AUTO: 298 10E3/UL (ref 150–450)
PLATELET # BLD AUTO: 313 10E3/UL (ref 150–450)
PLATELET # BLD AUTO: 329 10E3/UL (ref 150–450)
PLATELET # BLD AUTO: 351 10E3/UL (ref 150–450)
PO2 BLD: 90 MM HG (ref 80–105)
POTASSIUM SERPL-SCNC: 4.5 MMOL/L (ref 3.4–5.3)
POTASSIUM SERPL-SCNC: 4.7 MMOL/L (ref 3.4–5.3)
POTASSIUM SERPL-SCNC: 4.9 MMOL/L (ref 3.4–5.3)
POTASSIUM SERPL-SCNC: 5.7 MMOL/L (ref 3.4–5.3)
POTASSIUM SERPL-SCNC: 5.8 MMOL/L (ref 3.4–5.3)
PR INTERVAL - MUSE: 196 MS
PR INTERVAL - MUSE: 198 MS
PR INTERVAL - MUSE: 200 MS
PR INTERVAL - MUSE: 214 MS
PROT SERPL-MCNC: 6.3 G/DL (ref 6.4–8.3)
PROT SERPL-MCNC: 7 G/DL (ref 6.4–8.3)
PROT SERPL-MCNC: 7.2 G/DL (ref 6.4–8.3)
QRS DURATION - MUSE: 100 MS
QRS DURATION - MUSE: 102 MS
QRS DURATION - MUSE: 174 MS
QRS DURATION - MUSE: 98 MS
QT - MUSE: 420 MS
QT - MUSE: 438 MS
QT - MUSE: 448 MS
QT - MUSE: 462 MS
QTC - MUSE: 422 MS
QTC - MUSE: 439 MS
QTC - MUSE: 468 MS
QTC - MUSE: 517 MS
R AXIS - MUSE: -34 DEGREES
R AXIS - MUSE: -44 DEGREES
R AXIS - MUSE: 39 DEGREES
R AXIS - MUSE: 7 DEGREES
RBC # BLD AUTO: 3.05 10E6/UL (ref 3.8–5.2)
RBC # BLD AUTO: 3.21 10E6/UL (ref 3.8–5.2)
RBC # BLD AUTO: 3.43 10E6/UL (ref 3.8–5.2)
RBC # BLD AUTO: 3.46 10E6/UL (ref 3.8–5.2)
RBC # BLD AUTO: 3.57 10E6/UL (ref 3.8–5.2)
RBC URINE: 0 /HPF
RBC URINE: 31 /HPF
RBC URINE: >182 /HPF
RSV RNA SPEC NAA+PROBE: NEGATIVE
RSV RNA SPEC NAA+PROBE: NEGATIVE
SARS-COV-2 RNA RESP QL NAA+PROBE: NEGATIVE
SARS-COV-2 RNA RESP QL NAA+PROBE: NEGATIVE
SODIUM SERPL-SCNC: 129 MMOL/L (ref 136–145)
SODIUM SERPL-SCNC: 133 MMOL/L (ref 136–145)
SODIUM SERPL-SCNC: 136 MMOL/L (ref 136–145)
SODIUM SERPL-SCNC: 137 MMOL/L (ref 136–145)
SODIUM SERPL-SCNC: 138 MMOL/L (ref 136–145)
SP GR UR STRIP: 1.02 (ref 1–1.03)
SP GR UR STRIP: 1.02 (ref 1–1.03)
SP GR UR STRIP: ABNORMAL
SYSTOLIC BLOOD PRESSURE - MUSE: NORMAL MMHG
T AXIS - MUSE: 110 DEGREES
T AXIS - MUSE: 52 DEGREES
T AXIS - MUSE: 76 DEGREES
T AXIS - MUSE: 78 DEGREES
TROPONIN T SERPL HS-MCNC: 151 NG/L
TROPONIN T SERPL HS-MCNC: 158 NG/L
TROPONIN T SERPL HS-MCNC: 163 NG/L
TROPONIN T SERPL HS-MCNC: 345 NG/L
UROBILINOGEN UR STRIP-MCNC: ABNORMAL MG/DL
UROBILINOGEN UR STRIP-MCNC: NORMAL MG/DL
UROBILINOGEN UR STRIP-MCNC: NORMAL MG/DL
VENTRICULAR RATE- MUSE: 58 BPM
VENTRICULAR RATE- MUSE: 61 BPM
VENTRICULAR RATE- MUSE: 62 BPM
VENTRICULAR RATE- MUSE: 84 BPM
WBC # BLD AUTO: 12.9 10E3/UL (ref 4–11)
WBC # BLD AUTO: 13 10E3/UL (ref 4–11)
WBC # BLD AUTO: 18.9 10E3/UL (ref 4–11)
WBC # BLD AUTO: 6.1 10E3/UL (ref 4–11)
WBC # BLD AUTO: 8.6 10E3/UL (ref 4–11)
WBC CLUMPS #/AREA URNS HPF: PRESENT /HPF
WBC URINE: 0 /HPF
WBC URINE: >182 /HPF
WBC URINE: >182 /HPF
YEAST #/AREA URNS HPF: ABNORMAL /HPF

## 2023-01-01 PROCEDURE — 258N000003 HC RX IP 258 OP 636: Performed by: FAMILY MEDICINE

## 2023-01-01 PROCEDURE — 96375 TX/PRO/DX INJ NEW DRUG ADDON: CPT

## 2023-01-01 PROCEDURE — 99285 EMERGENCY DEPT VISIT HI MDM: CPT | Performed by: FAMILY MEDICINE

## 2023-01-01 PROCEDURE — 250N000011 HC RX IP 250 OP 636: Performed by: FAMILY MEDICINE

## 2023-01-01 PROCEDURE — 80048 BASIC METABOLIC PNL TOTAL CA: CPT | Performed by: FAMILY MEDICINE

## 2023-01-01 PROCEDURE — 85025 COMPLETE CBC W/AUTO DIFF WBC: CPT | Performed by: NURSE PRACTITIONER

## 2023-01-01 PROCEDURE — 85025 COMPLETE CBC W/AUTO DIFF WBC: CPT | Mod: 91 | Performed by: FAMILY MEDICINE

## 2023-01-01 PROCEDURE — C9113 INJ PANTOPRAZOLE SODIUM, VIA: HCPCS | Mod: JZ | Performed by: FAMILY MEDICINE

## 2023-01-01 PROCEDURE — C9803 HOPD COVID-19 SPEC COLLECT: HCPCS | Performed by: FAMILY MEDICINE

## 2023-01-01 PROCEDURE — 82272 OCCULT BLD FECES 1-3 TESTS: CPT | Performed by: FAMILY MEDICINE

## 2023-01-01 PROCEDURE — 71045 X-RAY EXAM CHEST 1 VIEW: CPT

## 2023-01-01 PROCEDURE — 99284 EMERGENCY DEPT VISIT MOD MDM: CPT | Performed by: EMERGENCY MEDICINE

## 2023-01-01 PROCEDURE — 85025 COMPLETE CBC W/AUTO DIFF WBC: CPT | Performed by: FAMILY MEDICINE

## 2023-01-01 PROCEDURE — 250N000013 HC RX MED GY IP 250 OP 250 PS 637: Performed by: FAMILY MEDICINE

## 2023-01-01 PROCEDURE — 93005 ELECTROCARDIOGRAM TRACING: CPT | Performed by: FAMILY MEDICINE

## 2023-01-01 PROCEDURE — 36415 COLL VENOUS BLD VENIPUNCTURE: CPT | Performed by: EMERGENCY MEDICINE

## 2023-01-01 PROCEDURE — 93010 ELECTROCARDIOGRAM REPORT: CPT | Performed by: INTERNAL MEDICINE

## 2023-01-01 PROCEDURE — 93005 ELECTROCARDIOGRAM TRACING: CPT | Performed by: EMERGENCY MEDICINE

## 2023-01-01 PROCEDURE — 96366 THER/PROPH/DIAG IV INF ADDON: CPT | Performed by: FAMILY MEDICINE

## 2023-01-01 PROCEDURE — 99285 EMERGENCY DEPT VISIT HI MDM: CPT | Mod: 25 | Performed by: FAMILY MEDICINE

## 2023-01-01 PROCEDURE — 96368 THER/DIAG CONCURRENT INF: CPT | Performed by: FAMILY MEDICINE

## 2023-01-01 PROCEDURE — 80053 COMPREHEN METABOLIC PANEL: CPT | Performed by: FAMILY MEDICINE

## 2023-01-01 PROCEDURE — 85004 AUTOMATED DIFF WBC COUNT: CPT | Performed by: FAMILY MEDICINE

## 2023-01-01 PROCEDURE — 83036 HEMOGLOBIN GLYCOSYLATED A1C: CPT | Performed by: NURSE PRACTITIONER

## 2023-01-01 PROCEDURE — 74176 CT ABD & PELVIS W/O CONTRAST: CPT | Mod: TC

## 2023-01-01 PROCEDURE — 83880 ASSAY OF NATRIURETIC PEPTIDE: CPT | Performed by: FAMILY MEDICINE

## 2023-01-01 PROCEDURE — 84484 ASSAY OF TROPONIN QUANT: CPT | Performed by: FAMILY MEDICINE

## 2023-01-01 PROCEDURE — 74176 CT ABD & PELVIS W/O CONTRAST: CPT

## 2023-01-01 PROCEDURE — 250N000011 HC RX IP 250 OP 636: Mod: JZ | Performed by: FAMILY MEDICINE

## 2023-01-01 PROCEDURE — 87086 URINE CULTURE/COLONY COUNT: CPT | Performed by: NURSE PRACTITIONER

## 2023-01-01 PROCEDURE — 36600 WITHDRAWAL OF ARTERIAL BLOOD: CPT | Performed by: FAMILY MEDICINE

## 2023-01-01 PROCEDURE — 84484 ASSAY OF TROPONIN QUANT: CPT | Performed by: EMERGENCY MEDICINE

## 2023-01-01 PROCEDURE — 99291 CRITICAL CARE FIRST HOUR: CPT | Mod: 25 | Performed by: FAMILY MEDICINE

## 2023-01-01 PROCEDURE — 87086 URINE CULTURE/COLONY COUNT: CPT | Performed by: FAMILY MEDICINE

## 2023-01-01 PROCEDURE — 36415 COLL VENOUS BLD VENIPUNCTURE: CPT | Performed by: FAMILY MEDICINE

## 2023-01-01 PROCEDURE — 82310 ASSAY OF CALCIUM: CPT | Performed by: FAMILY MEDICINE

## 2023-01-01 PROCEDURE — 87077 CULTURE AEROBIC IDENTIFY: CPT | Performed by: FAMILY MEDICINE

## 2023-01-01 PROCEDURE — 80053 COMPREHEN METABOLIC PANEL: CPT | Performed by: EMERGENCY MEDICINE

## 2023-01-01 PROCEDURE — 81001 URINALYSIS AUTO W/SCOPE: CPT | Performed by: NURSE PRACTITIONER

## 2023-01-01 PROCEDURE — 99283 EMERGENCY DEPT VISIT LOW MDM: CPT | Performed by: EMERGENCY MEDICINE

## 2023-01-01 PROCEDURE — 96374 THER/PROPH/DIAG INJ IV PUSH: CPT | Performed by: FAMILY MEDICINE

## 2023-01-01 PROCEDURE — 96374 THER/PROPH/DIAG INJ IV PUSH: CPT

## 2023-01-01 PROCEDURE — 80048 BASIC METABOLIC PNL TOTAL CA: CPT | Performed by: EMERGENCY MEDICINE

## 2023-01-01 PROCEDURE — 71045 X-RAY EXAM CHEST 1 VIEW: CPT | Mod: TC

## 2023-01-01 PROCEDURE — 99285 EMERGENCY DEPT VISIT HI MDM: CPT | Mod: 25

## 2023-01-01 PROCEDURE — 81001 URINALYSIS AUTO W/SCOPE: CPT | Performed by: FAMILY MEDICINE

## 2023-01-01 PROCEDURE — 93005 ELECTROCARDIOGRAM TRACING: CPT | Mod: 76 | Performed by: EMERGENCY MEDICINE

## 2023-01-01 PROCEDURE — 85004 AUTOMATED DIFF WBC COUNT: CPT | Performed by: EMERGENCY MEDICINE

## 2023-01-01 PROCEDURE — 99284 EMERGENCY DEPT VISIT MOD MDM: CPT | Performed by: FAMILY MEDICINE

## 2023-01-01 PROCEDURE — 96375 TX/PRO/DX INJ NEW DRUG ADDON: CPT | Performed by: FAMILY MEDICINE

## 2023-01-01 PROCEDURE — 87637 SARSCOV2&INF A&B&RSV AMP PRB: CPT | Performed by: FAMILY MEDICINE

## 2023-01-01 PROCEDURE — 82805 BLOOD GASES W/O2 SATURATION: CPT | Performed by: FAMILY MEDICINE

## 2023-01-01 PROCEDURE — 87040 BLOOD CULTURE FOR BACTERIA: CPT | Performed by: FAMILY MEDICINE

## 2023-01-01 PROCEDURE — 96365 THER/PROPH/DIAG IV INF INIT: CPT | Performed by: FAMILY MEDICINE

## 2023-01-01 PROCEDURE — 83605 ASSAY OF LACTIC ACID: CPT | Performed by: FAMILY MEDICINE

## 2023-01-01 PROCEDURE — 83735 ASSAY OF MAGNESIUM: CPT | Performed by: EMERGENCY MEDICINE

## 2023-01-01 RX ORDER — CALCIUM GLUCONATE 94 MG/ML
1 INJECTION, SOLUTION INTRAVENOUS ONCE
Status: COMPLETED | OUTPATIENT
Start: 2023-01-01 | End: 2023-01-01

## 2023-01-01 RX ORDER — ACETAMINOPHEN 650 MG/1
650 SUPPOSITORY RECTAL
Status: DISCONTINUED | OUTPATIENT
Start: 2023-01-01 | End: 2023-01-01 | Stop reason: HOSPADM

## 2023-01-01 RX ORDER — VANCOMYCIN HYDROCHLORIDE 500 MG/10ML
500 INJECTION, POWDER, LYOPHILIZED, FOR SOLUTION INTRAVENOUS EVERY 24 HOURS
Status: DISCONTINUED | OUTPATIENT
Start: 2023-01-01 | End: 2023-01-01

## 2023-01-01 RX ORDER — HYDROMORPHONE HCL IN WATER/PF 6 MG/30 ML
0.2 PATIENT CONTROLLED ANALGESIA SYRINGE INTRAVENOUS
Status: COMPLETED | OUTPATIENT
Start: 2023-01-01 | End: 2023-01-01

## 2023-01-01 RX ORDER — SODIUM CHLORIDE 9 MG/ML
INJECTION, SOLUTION INTRAVENOUS CONTINUOUS
Status: DISCONTINUED | OUTPATIENT
Start: 2023-01-01 | End: 2023-01-01 | Stop reason: HOSPADM

## 2023-01-01 RX ORDER — CEFAZOLIN SODIUM 1 G/50ML
2000 SOLUTION INTRAVENOUS ONCE
Status: COMPLETED | OUTPATIENT
Start: 2023-01-01 | End: 2023-01-01

## 2023-01-01 RX ORDER — HEPARIN SODIUM 10000 [USP'U]/100ML
0-5000 INJECTION, SOLUTION INTRAVENOUS CONTINUOUS
Status: DISCONTINUED | OUTPATIENT
Start: 2023-01-01 | End: 2023-01-01 | Stop reason: HOSPADM

## 2023-01-01 RX ORDER — ONDANSETRON 2 MG/ML
4 INJECTION INTRAMUSCULAR; INTRAVENOUS EVERY 30 MIN PRN
Status: DISCONTINUED | OUTPATIENT
Start: 2023-01-01 | End: 2023-01-01 | Stop reason: HOSPADM

## 2023-01-01 RX ORDER — NALOXONE HYDROCHLORIDE 0.4 MG/ML
0.2 INJECTION, SOLUTION INTRAMUSCULAR; INTRAVENOUS; SUBCUTANEOUS
Status: DISCONTINUED | OUTPATIENT
Start: 2023-01-01 | End: 2023-01-01 | Stop reason: HOSPADM

## 2023-01-01 RX ORDER — NALOXONE HYDROCHLORIDE 0.4 MG/ML
0.4 INJECTION, SOLUTION INTRAMUSCULAR; INTRAVENOUS; SUBCUTANEOUS
Status: DISCONTINUED | OUTPATIENT
Start: 2023-01-01 | End: 2023-01-01 | Stop reason: HOSPADM

## 2023-01-01 RX ORDER — CEFEPIME HYDROCHLORIDE 1 G/1
1 INJECTION, POWDER, FOR SOLUTION INTRAMUSCULAR; INTRAVENOUS ONCE
Status: COMPLETED | OUTPATIENT
Start: 2023-01-01 | End: 2023-01-01

## 2023-01-01 RX ORDER — CEFDINIR 300 MG/1
300 CAPSULE ORAL DAILY
Qty: 10 CAPSULE | Refills: 0 | Status: SHIPPED | OUTPATIENT
Start: 2023-01-01 | End: 2023-01-01

## 2023-01-01 RX ORDER — NITROGLYCERIN 20 MG/100ML
5-200 INJECTION INTRAVENOUS CONTINUOUS
Status: DISCONTINUED | OUTPATIENT
Start: 2023-01-01 | End: 2023-01-01 | Stop reason: HOSPADM

## 2023-01-01 RX ORDER — ACETAMINOPHEN 325 MG/1
650 TABLET ORAL
Status: DISCONTINUED | OUTPATIENT
Start: 2023-01-01 | End: 2023-01-01 | Stop reason: HOSPADM

## 2023-01-01 RX ORDER — LEVOFLOXACIN 5 MG/ML
750 INJECTION, SOLUTION INTRAVENOUS ONCE
Status: COMPLETED | OUTPATIENT
Start: 2023-01-01 | End: 2023-01-01

## 2023-01-01 RX ORDER — CEFEPIME HYDROCHLORIDE 2 G/1
2 INJECTION, POWDER, FOR SOLUTION INTRAVENOUS ONCE
Status: DISCONTINUED | OUTPATIENT
Start: 2023-01-01 | End: 2023-01-01

## 2023-01-01 RX ADMIN — VANCOMYCIN HYDROCHLORIDE 2000 MG: 10 INJECTION, POWDER, LYOPHILIZED, FOR SOLUTION INTRAVENOUS at 01:34

## 2023-01-01 RX ADMIN — HYDROMORPHONE HYDROCHLORIDE 0.2 MG: 0.2 INJECTION, SOLUTION INTRAMUSCULAR; INTRAVENOUS; SUBCUTANEOUS at 16:21

## 2023-01-01 RX ADMIN — HEPARIN SODIUM 1200 UNITS/HR: 10000 INJECTION, SOLUTION INTRAVENOUS at 16:23

## 2023-01-01 RX ADMIN — CEFEPIME HYDROCHLORIDE 1 G: 1 INJECTION, POWDER, FOR SOLUTION INTRAMUSCULAR; INTRAVENOUS at 02:04

## 2023-01-01 RX ADMIN — PANTOPRAZOLE SODIUM 40 MG: 40 INJECTION, POWDER, FOR SOLUTION INTRAVENOUS at 23:40

## 2023-01-01 RX ADMIN — VANCOMYCIN HYDROCHLORIDE 2000 MG: 10 INJECTION, POWDER, LYOPHILIZED, FOR SOLUTION INTRAVENOUS at 14:55

## 2023-01-01 RX ADMIN — LEVOFLOXACIN 750 MG: 5 INJECTION, SOLUTION INTRAVENOUS at 14:32

## 2023-01-01 RX ADMIN — CALCIUM GLUCONATE 1 G: 98 INJECTION, SOLUTION INTRAVENOUS at 17:07

## 2023-01-01 RX ADMIN — TICAGRELOR 180 MG: 90 TABLET ORAL at 17:04

## 2023-01-01 ASSESSMENT — ENCOUNTER SYMPTOMS
SHORTNESS OF BREATH: 1
FLANK PAIN: 1
DYSURIA: 0
CHILLS: 0
BLOOD IN STOOL: 1
NAUSEA: 0
CHEST TIGHTNESS: 0
FEVER: 0
LIGHT-HEADEDNESS: 0
BACK PAIN: 1
VOMITING: 0
SHORTNESS OF BREATH: 0
FEVER: 1
AGITATION: 0
DIAPHORESIS: 1
PALPITATIONS: 1
FEVER: 1
ARTHRALGIAS: 0

## 2023-01-01 ASSESSMENT — ACTIVITIES OF DAILY LIVING (ADL)
ADLS_ACUITY_SCORE: 35

## 2023-02-26 NOTE — ED PROVIDER NOTES
History     Chief Complaint   Patient presents with     Chest Pain     Shortness of Breath     The history is provided by the patient, the EMS personnel and medical records.     Lucila Willett is a 67 year old female here by EMS from [facility].   She had chest pressure that started at about 1:45 PM. She felt sweaty and told staff. They called 911.     Per EMS:  She had chest pressure, SOB, looked pale with diaphoresis. EKG shows sinus tachycardia. They gave 324 mg aspirin and started 150 mg amiodarone. She converted to sinus rhythm and her EKG shows LBBB, which appears to be new for her.    Her POLST on file says comfort care only, signed 2021. Her paperwork from [facility] says she is full code, signed February 7, 2023.  We will treat her as FULL CODE. She has a history of renal failure on dialysis (next run tomorrow morning), heart disease including CAD with STEMI, dCHF, HTN (3V CABG 2008 [facility], stent since then, on Lasix, Plavix, aspirin ), insulin dependent type 2 DM, left BKA, lung disease including HENRY (on Duo Neb, Singulair), hypothyroidism (on LT), reflux (on omeprazole)    Allergies:  Allergies   Allergen Reactions     Zosyn Hives and Rash     Given IV benadryl, and Pepcid in outside facility.      Cetirizine      Other reaction(s): *Unknown - Pt Doesn't Remember     Pregabalin      Other reaction(s): Other - Describe In Comment Field  Floaters in eyes.      Tazobactam      Latex Rash     Patient states she gets a rash     Rosiglitazone Rash     Patient states she gets a rash     Zosyn [Piperacillin-Tazobactam In D5w] Rash       Problem List:    Patient Active Problem List    Diagnosis Date Noted     Cellulitis 08/21/2020     Priority: Medium     Leukocytosis 12/19/2016     Priority: Medium     Hyponatremia 06/10/2016     Priority: Medium     Cellulitis of left lower extremity 06/09/2016     Priority: Medium     Severe sepsis (H) 06/09/2016     Priority: Medium     Foot ulcer (H) 01/11/2016      Priority: Medium     Diastolic heart failure (H) 07/01/2015     Priority: Medium     Chronic pain disorder 06/30/2015     Priority: Medium     Overview:   Overview:   [facility] Agreement for Opioid Treatment  PHYSICIAN:  Gauri Grimm MD  PHARMACY:  [facility] in [place], [place]  PHARMACY PHONE:  [phone]    Last two urine drug screens have been negative - on 1/7/13 it had been two days since she took lortab.  Negative for norco aug 2015       Type 2 diabetes mellitus (H) 06/11/2013     Priority: Medium     Overview:   Overview:   LDL 94 - on crestor  Off valsartan because of worsening renal function ? Hypotension (10/2014: will retry low dose lisinopril)  On ASA  Right diabetic retinopathy on right 2/2014       Hypokalemia 09/13/2012     Priority: Medium     Anemia 09/12/2012     Priority: Medium     Cellulitis and abscess of leg, except foot 09/12/2012     Priority: Medium     Diabetes mellitus, type II (H) 09/12/2012     Priority: Medium     Overview:   a system change updated this record. This will not affect patient care or billing. This comment can be deleted.       Hypertensive heart failure with end stage renal disease (H) 09/12/2012     Priority: Medium     Morbid obesity (H) 09/12/2012     Priority: Medium     Chronic kidney disease, stage IV (severe) (H) 03/23/2012     Priority: Medium     Overview:   Overview:   IMO Update 10/11       Atherosclerosis of coronary artery 03/17/2010     Priority: Medium     Overview:   Overview:   IMO Update       Gout 02/29/2008     Priority: Medium     Essential hypertension 02/27/2008     Priority: Medium     Hypothyroidism 02/27/2008     Priority: Medium     Overview:   Overview:   IMO Update 10/11       Sleep apnea 02/27/2008     Priority: Medium     Overview:   Overview:   10/2012: cpap did not work - trying BIPAP          Past Medical History:    Past Medical History:   Diagnosis Date     Atherosclerotic heart disease of native coronary artery without  angina pectoris      Cellulitis      Chronic kidney disease      Diastolic congestive heart failure (H)      Hypothyroidism      Major depressive disorder, single episode      Obesity      Personal history of other medical treatment (CODE)      Sleep apnea      Type 2 diabetes mellitus without complications (H)        Past Surgical History:    Past Surgical History:   Procedure Laterality Date     CHOLECYSTECTOMY      No Comments Provided     OTHER SURGICAL HISTORY      792983,OTHER     OTHER SURGICAL HISTORY      90397.0,VT SPINE FUSION ANTER 3 SGMTS     OTHER SURGICAL HISTORY      LOC-1006.04,CABG     OTHER SURGICAL HISTORY      [id number],ENDOSCOPY GI       Family History:    No family history on file.    Social History:  Marital Status:   [4]  Social History     Tobacco Use     Smoking status: Former     Packs/day: 2.00     Years: 20.00     Pack years: 40.00     Types: Cigarettes     Quit date: 1997     Years since quittin.4     Smokeless tobacco: Never   Substance Use Topics     Alcohol use: Yes     Alcohol/week: 0.0 standard drinks     Comment: Alcoholic Drinks/day: Occasionally     Drug use: Unknown     Types: Other     Comment: Drug use: No        Medications:    acetaminophen (TYLENOL) 325 MG tablet  allopurinol (ZYLOPRIM) 100 MG tablet  alum & mag hydroxide-simethicone (MAALOX) 200-200-20 MG/5ML SUSP suspension  aspirin (ASA) 81 MG chewable tablet  atorvastatin (LIPITOR) 80 MG tablet  B Complex-C-Folic Acid (DIALYVITE 800 PO)  blood glucose (NO BRAND SPECIFIED) lancets standard  blood glucose monitoring (NO BRAND SPECIFIED) test strip  buPROPion (WELLBUTRIN XL) 150 MG 24 hr tablet  clopidogrel (PLAVIX) 75 MG tablet  diclofenac (VOLTAREN) 1 % topical gel  FLUoxetine (PROZAC) 20 MG capsule  fluticasone (FLONASE) 50 MCG/ACT spray  gabapentin (NEURONTIN) 100 MG capsule  guaiFENesin (ROBITUSSIN) 20 mg/mL SOLN solution  hypromellose (GENTEAL) 0.3 % SOLN ophthalmic solution  insulin aspart (NOVOLOG  "PEN) 100 UNIT/ML injection  insulin aspart (NOVOLOG PEN) 100 UNIT/ML pen  insulin detemir (LEVEMIR PEN) 100 UNIT/ML pen  levothyroxine (SYNTHROID/LEVOTHROID) 200 MCG tablet  levothyroxine (SYNTHROID/LEVOTHROID) 25 MCG tablet  lidocaine-prilocaine (EMLA) 2.5-2.5 % external cream  loratadine (CLARITIN) 10 MG tablet  magnesium hydroxide (MILK OF MAGNESIA) 400 MG/5ML suspension  Menthol (COUGH DROPS) 5.8 MG LOZG  miconazole (MICATIN) 2 % cream  midodrine (PROAMATINE) 10 MG tablet  midodrine (PROAMATINE) 10 MG tablet  montelukast (SINGULAIR) 10 MG tablet  Needle, Disp, 31G X 5/16\" MISC  nitroGLYcerin (NITROSTAT) 0.4 MG sublingual tablet  nystatin (MYCOSTATIN) 930277 UNIT/GM external powder  omeprazole (PRILOSEC) 20 MG DR capsule  ondansetron (ZOFRAN ODT) 4 MG ODT tab  polyethylene glycol (MIRALAX) 17 GM/Dose powder  senna-docusate (SENOKOT-S/PERICOLACE) 8.6-50 MG tablet  sevelamer carbonate (RENVELA) 800 MG tablet  vitamin D3 (CHOLECALCIFEROL) 50 mcg (2000 units) tablet          Review of Systems   Constitutional: Positive for diaphoresis.   Respiratory: Positive for shortness of breath.    Cardiovascular: Positive for chest pain.   Skin: Positive for pallor.   All other systems reviewed and are negative.      Physical Exam   BP: 133/71  Pulse: 85  Temp: 97.6  F (36.4  C)  Resp: 27  Height: 162.6 cm (5' 4\")  Weight: 101.2 kg (223 lb)  SpO2: 95 %      Physical Exam  Vitals and nursing note reviewed.   Constitutional:       General: She is not in acute distress.     Appearance: She is well-developed. She is ill-appearing and diaphoretic. She is not toxic-appearing.   Cardiovascular:      Rate and Rhythm: Normal rate and regular rhythm.      Pulses:           Radial pulses are 2+ on the right side and 2+ on the left side.      Heart sounds: Normal heart sounds. No murmur heard.  Pulmonary:      Breath sounds: Normal breath sounds. No decreased breath sounds, wheezing, rhonchi or rales.   Chest:      Chest wall: No mass, " tenderness or edema.   Abdominal:      General: Bowel sounds are normal.      Palpations: Abdomen is soft.      Tenderness: There is no abdominal tenderness.   Musculoskeletal:      Right lower leg: No tenderness. No edema.      Comments: She has a left BKA   Skin:     General: Skin is warm.      Findings: No erythema or rash.   Neurological:      General: No focal deficit present.      Mental Status: She is alert and oriented to person, place, and time.       EKG: Sinus rhythm with 1st degree block, left axis, rate 84, LBBB which is new from previous EKG done November 2022.    Results for orders placed or performed during the hospital encounter of 02/26/23 (from the past 24 hour(s))   CBC with platelets differential    Narrative    The following orders were created for panel order CBC with platelets differential.  Procedure                               Abnormality         Status                     ---------                               -----------         ------                     CBC with platelets and d...[[id number]]  Abnormal            Final result                 Please view results for these tests on the individual orders.   Basic metabolic panel   Result Value Ref Range    Sodium 136 136 - 145 mmol/L    Potassium 5.8 (H) 3.4 - 5.3 mmol/L    Chloride 99 98 - 107 mmol/L    Carbon Dioxide (CO2) 23 22 - 29 mmol/L    Anion Gap 14 7 - 15 mmol/L    Urea Nitrogen 41.5 (H) 8.0 - 23.0 mg/dL    Creatinine 4.74 (H) 0.51 - 0.95 mg/dL    Calcium 9.2 8.8 - 10.2 mg/dL    Glucose 265 (H) 70 - 99 mg/dL    GFR Estimate 9 (L) >60 mL/min/1.73m2   Troponin T, High Sensitivity   Result Value Ref Range    Troponin T, High Sensitivity 345 (HH) <=14 ng/L   Nt probnp inpatient (BNP)   Result Value Ref Range    N terminal Pro BNP Inpatient 51,975 (H) 0 - 900 pg/mL   CBC with platelets and differential   Result Value Ref Range    WBC Count 8.6 4.0 - 11.0 10e3/uL    RBC Count 3.57 (L) 3.80 - 5.20 10e6/uL    Hemoglobin 11.2 (L) 11.7 -  15.7 g/dL    Hematocrit 34.8 (L) 35.0 - 47.0 %    MCV 98 78 - 100 fL    MCH 31.4 26.5 - 33.0 pg    MCHC 32.2 31.5 - 36.5 g/dL    RDW 17.5 (H) 10.0 - 15.0 %    Platelet Count 298 150 - 450 10e3/uL    % Neutrophils 64 %    % Lymphocytes 16 %    % Monocytes 13 %    % Eosinophils 3 %    % Basophils 2 %    % Immature Granulocytes 2 %    NRBCs per 100 WBC 0 <1 /100    Absolute Neutrophils 5.5 1.6 - 8.3 10e3/uL    Absolute Lymphocytes 1.4 0.8 - 5.3 10e3/uL    Absolute Monocytes 1.1 0.0 - 1.3 10e3/uL    Absolute Eosinophils 0.2 0.0 - 0.7 10e3/uL    Absolute Basophils 0.1 0.0 - 0.2 10e3/uL    Absolute Immature Granulocytes 0.2 <=0.4 10e3/uL    Absolute NRBCs 0.0 10e3/uL   Extra Tube    Narrative    The following orders were created for panel order Extra Tube.  Procedure                               Abnormality         Status                     ---------                               -----------         ------                     Extra Blue Top Tube[[id number]]                              In process                 Extra Red Top Tube[[id number]]                               In process                   Please view results for these tests on the individual orders.   XR Chest Port 1 View    Narrative    PROCEDURE:  XR CHEST PORT 1 VIEW    HISTORY:  chest pain.     COMPARISON:  11/14/2022    FINDINGS:   Heart is enlarged. Postoperative changes are seen in the mediastinum.  There is interstitial thickening seen in both lungs the interstitial  thickening is unchanged from previous examination. No pleural effusion  or pneumothorax.      Impression    IMPRESSION:  Interstitial thickening and cardiomegaly stable from  November 2022      JOSÉ MIGUEL MARIE MD         SYSTEM ID:  [id number]       Medications   sodium chloride 0.9% infusion (has no administration in time range)   heparin 25,000 units in 0.45% NaCl 250 mL ANTICOAGULANT infusion (1,200 Units/hr Intravenous $New Bag 2/26/23 7357)   ondansetron (ZOFRAN) injection 4 mg (has no  administration in time range)   nitroGLYcerin 50 mg in D5W 250 mL (adult std) infusion CENTRAL (has no administration in time range)   sodium chloride 0.9% infusion (has no administration in time range)   naloxone (NARCAN) injection 0.2 mg (has no administration in time range)   naloxone (NARCAN) injection 0.4 mg (has no administration in time range)   naloxone (NARCAN) injection 0.2 mg (has no administration in time range)   naloxone (NARCAN) injection 0.4 mg (has no administration in time range)   HYDROmorphone (DILAUDID) injection 0.2 mg (0.2 mg Intravenous $Given 2/26/23 1621)   heparin ANTICOAGULANT loading dose for  LOW INTENSITY TREATMENT* Give BEFORE starting heparin infusion (4,000 Units Intravenous $Given 2/26/23 1623)   ticagrelor (BRILINTA) tablet 180 mg (180 mg Oral $Given 2/26/23 1704)   calcium gluconate 10 % injection 1 g (1 g Intravenous $New Bag 2/26/23 1707)       Assessments & Plan (with Medical Decision Making)  Lucila Willett is a 67 year old female here by EMS from [facility].   She had chest pressure that started at about 1:45 PM. She felt sweaty and told staff. They called 911.  Per EMS:  She had chest pressure, SOB, looked pale with diaphoresis. EKG shows ventricular tachycardia. They gave 324 mg aspirin and started 150 mg amiodarone. She converted to sinus rhythm and her EKG shows LBBB, which appears to be new for her.  Her POLST on file says comfort care only, signed 2021. Her paperwork from [facility] says she is full code, signed February 7, 2023.  We will treat her as FULL CODE. She has a history of renal failure on dialysis (next run tomorrow morning), heart disease including CAD with STEMI, dCHF, HTN (3V CABG 2008 [facility], stent since then, on Lasix, Plavix, aspirin ), insulin dependent type 2 DM, left BKA, lung disease including HENRY (on Duo Neb, Singulair), hypothyroidism (on LT), reflux (on omeprazole) VS in the ED on 2 L of oxygen here /43   Pulse 84   Temp 97.6  " F (36.4  C) (Tympanic)   Resp 12   Ht 1.626 m (5' 4\")   Wt 101.2 kg (223 lb)   SpO2 98%   BMI 38.28 kg/m    She is typically on 1 1/2 L oxygen at night and occasionally on 1 1/2 L oxygen during the day.  She was using her oxygen today due to shortness of breath.  Exam shows some diaphoresis. Heart sounds are normal. Lungs are clear. EKG shows new LBBB, normal rate. We started heparin and have nitroglycerine ready to go if she has recurrence of her chest tightness.  Labs show CBC with hgb 11.2, BMP with K 5.8, Cr 4.74, glucose 265, BNP >3500 (final result pending), troponin 345, 4 Plex pending.  Chest xray looks fluffy around the cardiac silhouette, formal read says the same.   4:42 PM  Her troponin came back at 345. She is on Plavix and had her dose today.  This is STEMI given a new LBBB with ST depression in II, III, aVF and V4.  I called [facility] and spoke with Dr Menjivar who will accept the patient. I also spoke with interventional cardiology and they feel that she is STEMI given a new LBBB, chest pain and pressure, elevated troponin. We will transfer by helicopter.      I have reviewed the nursing notes.    I have reviewed the findings, diagnosis, plan and need for follow up with the patient.     Medical Decision Making  The patient's presentation was of high complexity (an acute health issue posing potential threat to life or bodily function).    The patient's evaluation involved:  an assessment requiring an independent historian (see separate area of note for details)  ordering and/or review of 3+ test(s) in this encounter (see separate area of note for details)  review of 1 test result(s) ordered prior to this encounter (EKG from November 2021)    The patient's management necessitated high risk (a decision regarding hospitalization).      Final diagnoses:   ST elevation myocardial infarction (STEMI), unspecified artery (H)   Chronic kidney disease, stage IV (severe) (H)   Type 2 diabetes mellitus with " other circulatory complication, unspecified whether long term insulin use (H)   Diastolic heart failure, unspecified HF chronicity (H)   Essential hypertension   Hyperkalemia       2/26/2023   [facility] AND [facility]     Daniel Julian MD  02/26/23 1721       Daniel Julian MD  02/26/23 1722

## 2023-02-26 NOTE — ED NOTES
Pt's family was able to call and get an update on patients status. Pt's vitals remained stable on transport and at time  Of transfer pt was not complaining of pain. Pt was given medication prior to departure (see MAR). Pt airlifted to [facility] cath lab .

## 2023-03-24 NOTE — ED NOTES
Call to TERRIE Jewell at [facility]. Update given. Awaiting return call regarding transportation.

## 2023-03-24 NOTE — ED PROVIDER NOTES
History     Chief Complaint   Patient presents with     Irregular Heart Beat     Fatigue     HPI  Lucila Willett is a 67 year old female who comes in by ambulance from local dialysis unit.  Patient was undergoing her run and they noticed that she seemed to be having a lot of extra beats on the cardiac monitor.  She was apparently bradycardic as well.  The patient herself reports that she does feel some palpitations but really does not feel poorly.  She says she has been tired since she was hospitalized in late February for a STEMI.  She was released 3 weeks ago after having received 3 stents.  She is states except for being more tired she has not been feeling ill.  Yesterday she ate and drink normally.  This morning she woke up felt a little tired but ate her normal breakfast.  While at the run she could feel the palpitations but denies any chest pressure heaviness tightness squeezing or pain.  Not feeling weak dizzy or lightheaded.  No diaphoresis.  No nausea.  Not feeling short of breath.  She does wear oxygen chronically at 1-1/2 L by nasal cannula.    Allergies:  Allergies   Allergen Reactions     Zosyn Hives and Rash     Given IV benadryl, and Pepcid in outside facility.      Cetirizine      Other reaction(s): *Unknown - Pt Doesn't Remember     Pregabalin      Other reaction(s): Other - Describe In Comment Field  Floaters in eyes.      Tazobactam      Latex Rash     Patient states she gets a rash     Rosiglitazone Rash     Patient states she gets a rash     Zosyn [Piperacillin-Tazobactam In D5w] Rash       Problem List:    Patient Active Problem List    Diagnosis Date Noted     Cellulitis 08/21/2020     Priority: Medium     Leukocytosis 12/19/2016     Priority: Medium     Hyponatremia 06/10/2016     Priority: Medium     Cellulitis of left lower extremity 06/09/2016     Priority: Medium     Severe sepsis (H) 06/09/2016     Priority: Medium     Foot ulcer (H) 01/11/2016     Priority: Medium     Diastolic  heart failure (H) 07/01/2015     Priority: Medium     Chronic pain disorder 06/30/2015     Priority: Medium     Overview:   Overview:   [facility] Agreement for Opioid Treatment  PHYSICIAN:  Gauri Grimm MD  PHARMACY:  [facility] in [place], [place]  PHARMACY PHONE:  [phone]    Last two urine drug screens have been negative - on 1/7/13 it had been two days since she took lortab.  Negative for norco aug 2015       Type 2 diabetes mellitus (H) 06/11/2013     Priority: Medium     Overview:   Overview:   LDL 94 - on crestor  Off valsartan because of worsening renal function ? Hypotension (10/2014: will retry low dose lisinopril)  On ASA  Right diabetic retinopathy on right 2/2014       Hypokalemia 09/13/2012     Priority: Medium     Anemia 09/12/2012     Priority: Medium     Cellulitis and abscess of leg, except foot 09/12/2012     Priority: Medium     Diabetes mellitus, type II (H) 09/12/2012     Priority: Medium     Overview:   a system change updated this record. This will not affect patient care or billing. This comment can be deleted.       Hypertensive heart failure with end stage renal disease (H) 09/12/2012     Priority: Medium     Morbid obesity (H) 09/12/2012     Priority: Medium     Chronic kidney disease, stage IV (severe) (H) 03/23/2012     Priority: Medium     Overview:   Overview:   IMO Update 10/11       Atherosclerosis of coronary artery 03/17/2010     Priority: Medium     Overview:   Overview:   IMO Update       Gout 02/29/2008     Priority: Medium     Essential hypertension 02/27/2008     Priority: Medium     Hypothyroidism 02/27/2008     Priority: Medium     Overview:   Overview:   IMO Update 10/11       Sleep apnea 02/27/2008     Priority: Medium     Overview:   Overview:   10/2012: cpap did not work - trying BIPAP          Past Medical History:    Past Medical History:   Diagnosis Date     Atherosclerotic heart disease of native coronary artery without angina pectoris      Cellulitis       Chronic kidney disease      Diastolic congestive heart failure (H)      Hypothyroidism      Major depressive disorder, single episode      Obesity      Personal history of other medical treatment (CODE)      Sleep apnea      Type 2 diabetes mellitus without complications (H)        Past Surgical History:    Past Surgical History:   Procedure Laterality Date     CHOLECYSTECTOMY      No Comments Provided     OTHER SURGICAL HISTORY      237396,OTHER     OTHER SURGICAL HISTORY      94555.0,IN SPINE FUSION ANTER 3 SGMTS     OTHER SURGICAL HISTORY      LOC-1006.04,CABG     OTHER SURGICAL HISTORY      288908,ENDOSCOPY GI       Family History:    No family history on file.    Social History:  Marital Status:   [4]  Social History     Tobacco Use     Smoking status: Former     Packs/day: 2.00     Years: 20.00     Pack years: 40.00     Types: Cigarettes     Quit date: 1997     Years since quittin.5     Smokeless tobacco: Never   Substance Use Topics     Alcohol use: Yes     Alcohol/week: 0.0 standard drinks     Comment: Alcoholic Drinks/day: Occasionally     Drug use: Unknown     Types: Other     Comment: Drug use: No        Medications:    acetaminophen (TYLENOL) 325 MG tablet  allopurinol (ZYLOPRIM) 100 MG tablet  alum & mag hydroxide-simethicone (MAALOX) 200-200-20 MG/5ML SUSP suspension  aspirin (ASA) 81 MG chewable tablet  atorvastatin (LIPITOR) 80 MG tablet  B Complex-C-Folic Acid (DIALYVITE 800 PO)  blood glucose (NO BRAND SPECIFIED) lancets standard  blood glucose monitoring (NO BRAND SPECIFIED) test strip  buPROPion (WELLBUTRIN XL) 150 MG 24 hr tablet  clopidogrel (PLAVIX) 75 MG tablet  diclofenac (VOLTAREN) 1 % topical gel  FLUoxetine (PROZAC) 20 MG capsule  fluticasone (FLONASE) 50 MCG/ACT spray  gabapentin (NEURONTIN) 100 MG capsule  guaiFENesin (ROBITUSSIN) 20 mg/mL SOLN solution  hypromellose (GENTEAL) 0.3 % SOLN ophthalmic solution  insulin aspart (NOVOLOG PEN) 100 UNIT/ML injection  insulin  "aspart (NOVOLOG PEN) 100 UNIT/ML pen  insulin detemir (LEVEMIR PEN) 100 UNIT/ML pen  levothyroxine (SYNTHROID/LEVOTHROID) 200 MCG tablet  levothyroxine (SYNTHROID/LEVOTHROID) 25 MCG tablet  lidocaine-prilocaine (EMLA) 2.5-2.5 % external cream  loratadine (CLARITIN) 10 MG tablet  magnesium hydroxide (MILK OF MAGNESIA) 400 MG/5ML suspension  Menthol (COUGH DROPS) 5.8 MG LOZG  miconazole (MICATIN) 2 % cream  midodrine (PROAMATINE) 10 MG tablet  midodrine (PROAMATINE) 10 MG tablet  montelukast (SINGULAIR) 10 MG tablet  Needle, Disp, 31G X 5/16\" MISC  nitroGLYcerin (NITROSTAT) 0.4 MG sublingual tablet  nystatin (MYCOSTATIN) 257830 UNIT/GM external powder  omeprazole (PRILOSEC) 20 MG DR capsule  ondansetron (ZOFRAN ODT) 4 MG ODT tab  polyethylene glycol (MIRALAX) 17 GM/Dose powder  senna-docusate (SENOKOT-S/PERICOLACE) 8.6-50 MG tablet  sevelamer carbonate (RENVELA) 800 MG tablet  vitamin D3 (CHOLECALCIFEROL) 50 mcg (2000 units) tablet          Review of Systems   Constitutional: Negative for chills and fever.   HENT: Negative for congestion.    Eyes: Negative for visual disturbance.   Respiratory: Negative for chest tightness and shortness of breath.    Cardiovascular: Positive for palpitations. Negative for chest pain.   Gastrointestinal: Negative for nausea and vomiting.   Genitourinary: Negative for dysuria.   Musculoskeletal: Negative for arthralgias.   Skin: Negative for rash.   Neurological: Negative for light-headedness.   Psychiatric/Behavioral: Negative for agitation.       Physical Exam   BP: 138/72  Pulse: 57  Temp: (!) 96.3  F (35.7  C)  Resp: 18  Height: 162.6 cm (5' 4\")  Weight: 113.1 kg (249 lb 4.8 oz)  SpO2: 98 %      Physical Exam  Vitals and nursing note reviewed.   Constitutional:       Appearance: She is obese.      Comments: She is alert, comfortable.  She uses a Tonio lift for transfer.   HENT:      Head: Normocephalic and atraumatic.      Mouth/Throat:      Mouth: Mucous membranes are moist. "   Eyes:      Conjunctiva/sclera: Conjunctivae normal.   Cardiovascular:      Rate and Rhythm: Normal rate and regular rhythm.      Heart sounds: Normal heart sounds.   Pulmonary:      Effort: Pulmonary effort is normal.      Breath sounds: Normal breath sounds.   Abdominal:      General: Bowel sounds are normal.      Palpations: Abdomen is soft.      Tenderness: There is no abdominal tenderness.   Skin:     General: Skin is warm and dry.   Neurological:      Mental Status: She is alert and oriented to person, place, and time.   Psychiatric:         Behavior: Behavior normal.         ED Course                 Procedures         Initial EKG shows sinus bradycardia 58 bpm.  No acute ST segment changes or T wave changes.  No ectopy.    Second EKG shows a sinus rhythm with frequent PVCs in a pattern of bigeminy.  Again no acute ST segment or T wave changes.       Results for orders placed or performed during the hospital encounter of 03/24/23 (from the past 24 hour(s))   EKG 12-lead, tracing only   Result Value Ref Range    Systolic Blood Pressure  mmHg    Diastolic Blood Pressure  mmHg    Ventricular Rate 62 BPM    Atrial Rate 62 BPM    WI Interval 198 ms    QRS Duration 100 ms     ms    QTc 468 ms    P Axis 43 degrees    R AXIS 39 degrees    T Axis 52 degrees    Interpretation ECG       Sinus rhythm with frequent Premature ventricular complexes in a pattern of bigeminy  Cannot rule out Anterior infarct (cited on or before 24-MAR-2023)  Abnormal ECG  When compared with ECG of 24-MAR-2023 09:39, (unconfirmed)  Premature ventricular complexes are now Present  Non-specific change in ST segment in Inferior leads  Nonspecific T wave abnormality now evident in Inferior leads     CBC with platelets differential    Narrative    The following orders were created for panel order CBC with platelets differential.  Procedure                               Abnormality         Status                     ---------                                -----------         ------                     CBC with platelets and d...[[id number]]  Abnormal            Final result                 Please view results for these tests on the individual orders.   Troponin T, High Sensitivity   Result Value Ref Range    Troponin T, High Sensitivity 151 (HH) <=14 ng/L   Comprehensive metabolic panel   Result Value Ref Range    Sodium 138 136 - 145 mmol/L    Potassium 4.7 3.4 - 5.3 mmol/L    Chloride 99 98 - 107 mmol/L    Carbon Dioxide (CO2) 28 22 - 29 mmol/L    Anion Gap 11 7 - 15 mmol/L    Urea Nitrogen 35.3 (H) 8.0 - 23.0 mg/dL    Creatinine 4.09 (H) 0.51 - 0.95 mg/dL    Calcium 9.3 8.8 - 10.2 mg/dL    Glucose 161 (H) 70 - 99 mg/dL    Alkaline Phosphatase 153 (H) 35 - 104 U/L    AST 14 10 - 35 U/L    ALT 12 10 - 35 U/L    Protein Total 6.3 (L) 6.4 - 8.3 g/dL    Albumin 3.7 3.5 - 5.2 g/dL    Bilirubin Total 0.3 <=1.2 mg/dL    GFR Estimate 11 (L) >60 mL/min/1.73m2   CBC with platelets and differential   Result Value Ref Range    WBC Count 6.1 4.0 - 11.0 10e3/uL    RBC Count 3.05 (L) 3.80 - 5.20 10e6/uL    Hemoglobin 9.4 (L) 11.7 - 15.7 g/dL    Hematocrit 29.7 (L) 35.0 - 47.0 %    MCV 97 78 - 100 fL    MCH 30.8 26.5 - 33.0 pg    MCHC 31.6 31.5 - 36.5 g/dL    RDW 16.8 (H) 10.0 - 15.0 %    Platelet Count 225 150 - 450 10e3/uL    % Neutrophils 43 %    % Lymphocytes 28 %    % Monocytes 18 %    % Eosinophils 8 %    % Basophils 2 %    % Immature Granulocytes 1 %    NRBCs per 100 WBC 0 <1 /100    Absolute Neutrophils 2.7 1.6 - 8.3 10e3/uL    Absolute Lymphocytes 1.7 0.8 - 5.3 10e3/uL    Absolute Monocytes 1.1 0.0 - 1.3 10e3/uL    Absolute Eosinophils 0.5 0.0 - 0.7 10e3/uL    Absolute Basophils 0.1 0.0 - 0.2 10e3/uL    Absolute Immature Granulocytes 0.0 <=0.4 10e3/uL    Absolute NRBCs 0.0 10e3/uL   Magnesium   Result Value Ref Range    Magnesium 2.2 1.7 - 2.3 mg/dL   Extra Tube    Narrative    The following orders were created for panel order Extra Tube.  Procedure                                Abnormality         Status                     ---------                               -----------         ------                     Extra Blue Top Tube[[id number]]                              Final result               Extra Red Top Tube[[id number]]                               Final result                 Please view results for these tests on the individual orders.   Extra Blue Top Tube   Result Value Ref Range    Hold Specimen JIC    Extra Red Top Tube   Result Value Ref Range    Hold Specimen JIC    Troponin T, High Sensitivity   Result Value Ref Range    Troponin T, High Sensitivity 158 (HH) <=14 ng/L   Extra Tube    Narrative    The following orders were created for panel order Extra Tube.  Procedure                               Abnormality         Status                     ---------                               -----------         ------                     Extra Red Top Tube[[id number]]                               In process                   Please view results for these tests on the individual orders.       Medications - No data to display    Assessments & Plan (with Medical Decision Making)     I have reviewed the nursing notes.    I have reviewed the findings, diagnosis, plan and need for follow up with the patient.  Patient sent in from dialysis as they noticed some irregular heart rates and some bradycardia on the monitor.  I believe that she was probably not significantly bradycardic.  Her rate is in the 60s and I believe  that when she was in bigeminy, the monitor was likely only calling  each of the bigeminy complexes as only 1 beat, however I believe she is perfusing on both of these.  She is certainly asymptomatic except for some mild sense of palpitations when this happens.  We have had her here for 3-1/2 hours on the monitor.  She is having some runs of bigeminy, none of them lasting more than 1 to 2 minutes, and again very asymptomatic when this happens.  Blood work is  reassuring with normal potassium and other electrolytes.  Creatinine is elevated at 4.  Troponin 151, however a serial 90-minute troponin is largely unchanged.  I believe this is elevated due to her dialysis status, and may still be somewhat elevated from her recent stenting.  She will be discharged home at this time.  We are going to try to get her in to dialysis to finish her run either today or tomorrow.  Return if worse.      New Prescriptions    No medications on file       Final diagnoses:   Palpitations   [place]       3/24/2023   [facility]     Josias Finnegan MD  03/24/23 4996

## 2023-03-24 NOTE — ED TRIAGE NOTES
"Patient presents to the ED via meds 1 from dialysis with reports of irregular heart beats. Was an hour into dialysis run. Pt reports \"feeling the irregular beat\" and being \"tired.\" Recent stent placement for MI. Denies chest pain or pressure. Chronically on O2 at 1-2 L. Resides at [facility].     Triage Assessment     Row Name 03/24/23 7096       Triage Assessment (Adult)    Airway WDL WDL       Respiratory WDL    Respiratory WDL WDL       Skin Circulation/Temperature WDL    Skin Circulation/Temperature WDL WDL       Cardiac WDL    Cardiac WDL X;rhythm       Spenser Coma Scale    Best Eye Response 4-->(E4) spontaneous    Best Motor Response 6-->(M6) obeys commands    Best Verbal Response 5-->(V5) oriented    [place]allo Coma Scale Score 15              "

## 2023-03-24 NOTE — DISCHARGE INSTRUCTIONS
All of your labs are looking stable at this time.  I think it is okay to go back home.  The reason the monitor looked weird is just because you are having some extra beats but there is nothing that looks alarming.  We will try to get you scheduled for dialysis today or tomorrow, otherwise start again on Monday as scheduled.  Return over the weekend if you are feeling worse

## 2023-03-26 NOTE — ED PROVIDER NOTES
Emergency Department Provider Note  : 1955 Age: 67 year old Sex: female MRN: [id number]    Chief Complaint   Patient presents with     Bradycardia       Medical Decision Making / Assessment / Plan   67 year old female presenting with an episode of bradycardia.  Her EKG done on arrival shows normal sinus rhythm without signs of ischemia or hyperkalemia.  We will plan to check troponin and potassium.    ED Course as of 23 0635   Sun Mar 26, 2023   0625 Troponin returned at 163, not changed from a couple of days ago.  Potassium 5.7.  That is slightly elevated but she does not have any signs of hyperkalemia on her EKG so I do not think that she needs shifting.  It is not surprising as she has not dialyzed a full run now for a few days.   0634 I updated the patient.  She will plan to dialyze tomorrow morning as scheduled.         Final diagnoses:   Hyperkalemia   Palpitations       Tong Conner MD  3/26/2023   Emergency Department    Subjective   Lucila is a 67 year old female who presents by EMS.  The patient complained of a little bit of shortness of breath at her nursing home this morning.  On vital sign check, she was bradycardic into the 30s.  Right now she reports that her breathing feels a bit better.  She has denied any chest pain throughout this time.  She was seen in the ER 2 days ago for very similar symptoms.  At that time she was at dialysis when they noted that she was bradycardic.  She got sent to the ER, had a work-up including troponins that were stable and a normal potassium.  It was noted that she was in bigeminy on her EKG.  She never did finish her dialysis from that day.  She is scheduled to dialyze again tomorrow morning (Monday).  She reports this morning that she was feeling palpitations more when she was at the nursing home but not now in the ER.  She complains of feeling tired and that she did not sleep very well last night.    I have reviewed the Medications, Allergies, Past  Medical and Surgical History, and Social History in the [facility] and with family.    Objective   BP: (!) 148/55  Pulse: 57  Temp: 97.2  F (36.2  C)  Resp: 21  SpO2: 97 %    Physical Exam:   General: awake and alert, wearing nasal cannula at 2 L  Eyes: conjugate gaze  ENT: moist mucous membranes  Chest/Respiratory: normal resp effort  Cardiovascular: warm and well perfused  Abdominal: soft, non-distended, non-tender  Extremities: well-healed left BKA  Neurological: normal speech, no focal deficits  Skin: warm and dry  Psychiatric: appropriate affect        Medical/Surgical History:  Past Medical History:   Diagnosis Date     Atherosclerotic heart disease of native coronary artery without angina pectoris     No Comments Provided     Cellulitis     No Comments Provided     Chronic kidney disease     No Comments Provided     Diastolic congestive heart failure (H)     No Comments Provided     Hypothyroidism     No Comments Provided     Major depressive disorder, single episode     No Comments Provided     Obesity     No Comments Provided     Personal history of other medical treatment (CODE)     No Comments Provided     Sleep apnea     No Comments Provided     Type 2 diabetes mellitus without complications (H)     No Comments Provided     Past Surgical History:   Procedure Laterality Date     CHOLECYSTECTOMY      No Comments Provided     OTHER SURGICAL HISTORY      744111,OTHER     OTHER SURGICAL HISTORY      86033.0,FL SPINE FUSION ANTER 3 SGMTS     OTHER SURGICAL HISTORY      LOC-1006.04,CABG     OTHER SURGICAL HISTORY      293618,ENDOSCOPY GI       Medications:  No current facility-administered medications for this encounter.     Current Outpatient Medications   Medication     acetaminophen (TYLENOL) 325 MG tablet     allopurinol (ZYLOPRIM) 100 MG tablet     alum & mag hydroxide-simethicone (MAALOX) 200-200-20 MG/5ML SUSP suspension     aspirin (ASA) 81 MG chewable tablet     atorvastatin (LIPITOR) 80 MG tablet      "B Complex-C-Folic Acid (DIALYVITE 800 PO)     blood glucose (NO BRAND SPECIFIED) lancets standard     blood glucose monitoring (NO BRAND SPECIFIED) test strip     buPROPion (WELLBUTRIN XL) 150 MG 24 hr tablet     clopidogrel (PLAVIX) 75 MG tablet     diclofenac (VOLTAREN) 1 % topical gel     FLUoxetine (PROZAC) 20 MG capsule     fluticasone (FLONASE) 50 MCG/ACT spray     gabapentin (NEURONTIN) 100 MG capsule     guaiFENesin (ROBITUSSIN) 20 mg/mL SOLN solution     hypromellose (GENTEAL) 0.3 % SOLN ophthalmic solution     insulin aspart (NOVOLOG PEN) 100 UNIT/ML injection     insulin aspart (NOVOLOG PEN) 100 UNIT/ML pen     insulin detemir (LEVEMIR PEN) 100 UNIT/ML pen     levothyroxine (SYNTHROID/LEVOTHROID) 200 MCG tablet     levothyroxine (SYNTHROID/LEVOTHROID) 25 MCG tablet     lidocaine-prilocaine (EMLA) 2.5-2.5 % external cream     loratadine (CLARITIN) 10 MG tablet     magnesium hydroxide (MILK OF MAGNESIA) 400 MG/5ML suspension     Menthol (COUGH DROPS) 5.8 MG LOZG     miconazole (MICATIN) 2 % cream     midodrine (PROAMATINE) 10 MG tablet     midodrine (PROAMATINE) 10 MG tablet     montelukast (SINGULAIR) 10 MG tablet     Needle, Disp, 31G X 5/16\" MISC     nitroGLYcerin (NITROSTAT) 0.4 MG sublingual tablet     nystatin (MYCOSTATIN) 855856 UNIT/GM external powder     omeprazole (PRILOSEC) 20 MG DR capsule     ondansetron (ZOFRAN ODT) 4 MG ODT tab     polyethylene glycol (MIRALAX) 17 GM/Dose powder     senna-docusate (SENOKOT-S/PERICOLACE) 8.6-50 MG tablet     sevelamer carbonate (RENVELA) 800 MG tablet     vitamin D3 (CHOLECALCIFEROL) 50 mcg (2000 units) tablet       Allergies:  Zosyn, Cetirizine, Pregabalin, Tazobactam, Latex, Rosiglitazone, and Zosyn [piperacillin-tazobactam in d5w]    Relevant labs, images, EKGs, Epic and outside hospital (if applicable) charts were reviewed. The findings, diagnosis, plan, and need for follow up were discussed with the patient/family. Nursing notes were reviewed.      "   Tong Conner MD  03/26/23 0697

## 2023-03-26 NOTE — ED TRIAGE NOTES
"Pt arrives via [facility] from [facility].  Pt was seen a few days ago for bradycardia and was told to come back in if this happened again.  During her morning vital signs, staff noticed her HR was in the 30's and pt felt \"dizzy\".  Upon arrival pt's HR was in the 60's and denies any dizziness.       Triage Assessment     Row Name 03/26/23 0535       Triage Assessment (Adult)    Airway WDL WDL       Cardiac WDL    Cardiac WDL X;rhythm    Pulse Rate & Regularity bradycardic       Peripheral/Neurovascular WDL    Peripheral Neurovascular WDL X       Cognitive/Neuro/Behavioral WDL    Cognitive/Neuro/Behavioral WDL WDL              "

## 2023-03-26 NOTE — ED NOTES
DATE:  3/26/2023   TIME OF RECEIPT FROM LAB:  0623  LAB TEST:  troponin  LAB VALUE:  163  RESULTS GIVEN WITH READ-BACK TO (PROVIDER):  Dalila  TIME LAB VALUE REPORTED TO PROVIDER:   0623

## 2023-08-18 PROBLEM — Z99.2 ESRD NEEDING DIALYSIS (H): Status: ACTIVE | Noted: 2019-09-20

## 2023-08-18 PROBLEM — R45.1 ANXIETY WITH AGITATION: Status: ACTIVE | Noted: 2023-01-01

## 2023-08-18 PROBLEM — I50.33 ACUTE ON CHRONIC DIASTOLIC (CONGESTIVE) HEART FAILURE (H): Status: ACTIVE | Noted: 2019-07-19

## 2023-08-18 PROBLEM — Z99.2 DEPENDENCE ON RENAL DIALYSIS (H): Status: ACTIVE | Noted: 2019-07-21

## 2023-08-18 PROBLEM — M14.671 CHARCOT ANKLE, RIGHT: Status: ACTIVE | Noted: 2022-06-21

## 2023-08-18 PROBLEM — Z89.512 HISTORY OF LEFT BELOW KNEE AMPUTATION (H): Status: ACTIVE | Noted: 2021-12-02

## 2023-08-18 PROBLEM — F41.9 ANXIETY WITH AGITATION: Status: ACTIVE | Noted: 2023-01-01

## 2023-08-18 PROBLEM — N18.6 ESRD NEEDING DIALYSIS (H): Status: ACTIVE | Noted: 2019-09-20

## 2023-08-18 PROBLEM — D50.9 IRON DEFICIENCY ANEMIA, UNSPECIFIED: Status: ACTIVE | Noted: 2020-06-18

## 2023-08-18 PROBLEM — I89.0 LYMPHEDEMA: Status: ACTIVE | Noted: 2017-03-06

## 2023-08-18 PROBLEM — Z53.20 COLONOSCOPY REFUSED: Status: ACTIVE | Noted: 2017-04-17

## 2023-08-18 PROBLEM — K64.8 OTHER HEMORRHOIDS: Chronic | Status: ACTIVE | Noted: 2021-12-19

## 2023-08-18 PROBLEM — Z78.9 NURSING HOME RESIDENT: Status: ACTIVE | Noted: 2020-06-10

## 2023-08-18 PROBLEM — D68.9 COAGULATION DEFECT, UNSPECIFIED (H): Status: ACTIVE | Noted: 2019-07-20

## 2023-08-19 NOTE — ED TRIAGE NOTES
Pt presents to ED from [facility] via MED for c/o fever and rectal bleeding. Reports of coffee ground stool x 3 days, nicol bleeding today. PT c/o  left lower back pain, denies abdominal pain. Febrile at 101.7. Pt alert.  /77   Pulse 73   Temp (!) 101.7  F (38.7  C) (Tympanic)   Resp 18   Wt 113.4 kg (250 lb)   SpO2 92%   BMI 42.91 kg/m         Triage Assessment       Row Name 08/18/23 0108       Triage Assessment (Adult)    Airway WDL WDL       Respiratory WDL    Respiratory WDL WDL       Skin Circulation/Temperature WDL    Skin Circulation/Temperature WDL X  flushed, clammy       Peripheral/Neurovascular WDL    Peripheral Neurovascular WDL WDL       Cognitive/Neuro/Behavioral WDL    Cognitive/Neuro/Behavioral WDL WDL

## 2023-08-19 NOTE — ED PROVIDER NOTES
[facility], [place]  Emergency Department Sign Out Note      Transfer of care from Dr. Brown. See separate Emergency Department note.    Assessment and Plan:  The patient was evaluated by the previous provider for anticipation of discharge to SNF on antibiotics.     ED Course as of 08/19/23 1110   Fri Aug 18, 2023   2300 Per review of chart, last colonoscopy was in 2012 through [facility].  It was abnormal.  A polyp was seen.  Recommended follow-up was repeat colonoscopy in 1 year.  I do not see any other colonoscopy report since that time but it is documented she has refused colonoscopy at least in 2020.   Sat Aug 19, 2023   0016 Will check lactate, CXR, UA. Sepsis protocol   0120 Will call [facility] for possible transfer to higher level of care. We do not have dialysis here   0131 No beds at [facility].    0239 No rectal bleeding and no hemorrhoids.  Found to get a urine with straight cath and it is pus and blood macroscopically.   0306 Continue cefepime. Ok to stop vanco   0313 Outpt CT urogram/urology follow up   0334 Blood but no WBC in urine   0335 Not accepted for waitlist at [facility] at this time. Will board for now. Check with care team in AM to see if they would be comfortable admitting her here over the weekend. Dialysis due Monday 0358 No obvious identifiable source of infection.  There was blood in her urine but no white cells.  Chest x-ray shows slightly volume up but this is likely normal for her given her chronic dialysis.  Skin is warm and dry.  She has no headache and no fever.  Less likely meningeal or encephalitis type picture without any headache neck pain or fever.  She is at her baseline mental status.  Blood cultures have been drawn.   0545 Patient told nurse she wanted to go home.  She then told me she wanted to stay.  If she wants to leave it would be AGAINST MEDICAL ADVICE at this point in time.  Fever has improved.  Recommend waiting 24 to 48 hours to ensure blood cultures  are negative prior to discharge.  She will need dialysis on Monday.  If increasing fever recommend transfer to higher level of care.  Patient aware.   1109 Patient was given cefepime and vancomycin IV.  Will discharge patient home on Omnice[facility] for urinary as well as pulmonary source of possible fever.  Patient is not fluid overloaded and her electrolytes are stable and does not require urgent dialysis.  We will discharge her back to nursing home and she will follow-up with dialysis as planned on Monday       Diagnosis  1. Fever, unspecified fever cause    2. Chronic kidney disease, stage IV (severe) (H)    3. ESRD needing dialysis (H)    4. Fever in adult        Disposition:  HOme       Viktoriya Napier MD  08/19/23 1477

## 2023-08-19 NOTE — ED PROVIDER NOTES
History     Chief Complaint   Patient presents with     Rectal Bleeding     Fever     HPI  Lucila Willett is a 68 year old female who presents for rectal bleeding.  She had what sounds like coffee-ground looking stool which then became bright red blood per rectum today.  She normally lives at a care facility.  Of significance she has end-stage renal disease from hypertensive disease and is on dialysis Monday Wednesday and Friday.  She did go to dialysis today.  She had a fever of 101.7 when she got here.  She denies abdominal pain or diarrhea just the blood per rectum.  No recent medication changes.  She is on aspirin.  No known sick contacts or outbreaks of foodborne illness at her current care facility.  She is a DNR/DNI and comfort measures if she has a significant event but at this time would like to pursue we will work up of what could be causing her symptoms and is open to transfer to higher level of care if needed.  She does not specifically complain of fever or febrile febrile but was noted to be febrile here.  She denies abdominal pain, nausea or vomiting.  No chest pain or shortness of breath.  Really no focal identifiable symptoms.  No rash.    Allergies:  Allergies   Allergen Reactions     Piperacillin Sod-Tazobactam So Hives and Rash     Given IV benadryl, and Pepcid in outside facility.      Cetirizine      Other reaction(s): *Unknown - Pt Doesn't Remember     Pregabalin      Other reaction(s): Other - Describe In Comment Field  Floaters in eyes.      Tazobactam      Latex Rash     Patient states she gets a rash     Rosiglitazone Rash     Patient states she gets a rash     Zosyn [Piperacillin-Tazobactam In Dex] Rash       Problem List:    Patient Active Problem List    Diagnosis Date Noted     Anxiety with agitation 04/09/2023     Priority: Medium     Charcot ankle, right 06/21/2022     Priority: Medium     Formatting of this note might be different from the original. Added automatically from  request for surgery [id number]       Other hemorrhoids 12/19/2021     Priority: Medium     History of left below knee amputation (H) 12/02/2021     Priority: Medium     Cellulitis 08/21/2020     Priority: Medium     Iron deficiency anemia, unspecified 06/18/2020     Priority: Medium     Nursing home resident 06/10/2020     Priority: Medium     Formatting of this note might be different from the original. [facility]       ESRD needing dialysis (H) 09/20/2019     Priority: Medium     Dependence on renal dialysis (H) 07/21/2019     Priority: Medium     Coagulation defect, unspecified (H) 07/20/2019     Priority: Medium     Acute on chronic diastolic (congestive) heart failure (H) 07/19/2019     Priority: Medium     Colonoscopy refused 04/17/2017     Priority: Medium     Lymphedema 03/06/2017     Priority: Medium     Cellulitis of left lower extremity 06/09/2016     Priority: Medium     Foot ulcer (H) 01/11/2016     Priority: Medium     Diastolic heart failure (H) 07/01/2015     Priority: Medium     Chronic combined systolic and diastolic heart failure (H) 07/01/2015     Priority: Medium     Chronic pain disorder 06/30/2015     Priority: Medium     Overview:   Overview:   [facility] Agreement for Opioid Treatment  PHYSICIAN:  Gauri Grimm MD  PHARMACY:  [facility] in [place], [place]  PHARMACY PHONE:  [phone]    Last two urine drug screens have been negative - on 1/7/13 it had been two days since she took lortab.  Negative for norco aug 2015       Type 2 diabetes mellitus (H) 06/11/2013     Priority: Medium     Overview:   Overview:   LDL 94 - on crestor  Off valsartan because of worsening renal function ? Hypotension (10/2014: will retry low dose lisinopril)  On ASA  Right diabetic retinopathy on right 2/2014       Anemia 09/12/2012     Priority: Medium     Cellulitis and abscess of leg, except foot 09/12/2012     Priority: Medium     Diabetes mellitus, type II (H) 09/12/2012     Priority: Medium     Overview:    a system change updated this record. This will not affect patient care or billing. This comment can be deleted.       Hypertensive heart failure with end stage renal disease (H) 2012     Priority: Medium     Morbid obesity (H) 2012     Priority: Medium     Chronic kidney disease, stage IV (severe) (H) 2012     Priority: Medium     Overview:   Overview:   IMO Update 10/11       Atherosclerosis of coronary artery 2010     Priority: Medium     Overview:   Overview:   IMO Update       Gout 2008     Priority: Medium     Essential hypertension 2008     Priority: Medium     Hypothyroidism 2008     Priority: Medium     Overview:   Overview:   IMO Update 10/11       Sleep apnea 2008     Priority: Medium     Overview:   Overview:   10/2012: cpap did not work - trying BIPAP          Past Medical History:    Past Medical History:   Diagnosis Date     Atherosclerotic heart disease of native coronary artery without angina pectoris      Cellulitis      Chronic kidney disease      Diastolic congestive heart failure (H)      Hypothyroidism      Major depressive disorder, single episode      Obesity      Personal history of other medical treatment (CODE)      Sleep apnea      Type 2 diabetes mellitus without complications (H)        Past Surgical History:    Past Surgical History:   Procedure Laterality Date     CHOLECYSTECTOMY      No Comments Provided     OTHER SURGICAL HISTORY      618261,OTHER     OTHER SURGICAL HISTORY      19033.0,CO SPINE FUSION ANTER 3 SGMTS     OTHER SURGICAL HISTORY      LOC-1006.04,CABG     OTHER SURGICAL HISTORY      539289,ENDOSCOPY GI       Family History:    History reviewed. No pertinent family history.    Social History:  Marital Status:   [4]  Social History     Tobacco Use     Smoking status: Former     Packs/day: 2.00     Years: 20.00     Pack years: 40.00     Types: Cigarettes     Quit date: 1997     Years since quittin.9      "Smokeless tobacco: Never   Substance Use Topics     Alcohol use: Yes     Alcohol/week: 0.0 standard drinks of alcohol     Comment: Alcoholic Drinks/day: Occasionally     Drug use: Unknown     Types: Other     Comment: Drug use: No        Medications:    acetaminophen (TYLENOL) 325 MG tablet  allopurinol (ZYLOPRIM) 100 MG tablet  alum & mag hydroxide-simethicone (MAALOX) 200-200-20 MG/5ML SUSP suspension  aspirin (ASA) 81 MG chewable tablet  atorvastatin (LIPITOR) 80 MG tablet  B Complex-C-Folic Acid (DIALYVITE 800 PO)  blood glucose (NO BRAND SPECIFIED) lancets standard  blood glucose monitoring (NO BRAND SPECIFIED) test strip  buPROPion (WELLBUTRIN XL) 150 MG 24 hr tablet  clopidogrel (PLAVIX) 75 MG tablet  diclofenac (VOLTAREN) 1 % topical gel  FLUoxetine (PROZAC) 20 MG capsule  fluticasone (FLONASE) 50 MCG/ACT spray  gabapentin (NEURONTIN) 100 MG capsule  guaiFENesin (ROBITUSSIN) 20 mg/mL SOLN solution  hypromellose (GENTEAL) 0.3 % SOLN ophthalmic solution  insulin aspart (NOVOLOG PEN) 100 UNIT/ML injection  insulin aspart (NOVOLOG PEN) 100 UNIT/ML pen  insulin detemir (LEVEMIR PEN) 100 UNIT/ML pen  levothyroxine (SYNTHROID/LEVOTHROID) 200 MCG tablet  levothyroxine (SYNTHROID/LEVOTHROID) 25 MCG tablet  lidocaine-prilocaine (EMLA) 2.5-2.5 % external cream  loratadine (CLARITIN) 10 MG tablet  magnesium hydroxide (MILK OF MAGNESIA) 400 MG/5ML suspension  Menthol (COUGH DROPS) 5.8 MG LOZG  miconazole (MICATIN) 2 % cream  midodrine (PROAMATINE) 10 MG tablet  midodrine (PROAMATINE) 10 MG tablet  montelukast (SINGULAIR) 10 MG tablet  Needle, Disp, 31G X 5/16\" MISC  nitroGLYcerin (NITROSTAT) 0.4 MG sublingual tablet  nystatin (MYCOSTATIN) 890177 UNIT/GM external powder  omeprazole (PRILOSEC) 20 MG DR capsule  ondansetron (ZOFRAN ODT) 4 MG ODT tab  polyethylene glycol (MIRALAX) 17 GM/Dose powder  senna-docusate (SENOKOT-S/PERICOLACE) 8.6-50 MG tablet  sevelamer carbonate (RENVELA) 800 MG tablet  vitamin D3 " (CHOLECALCIFEROL) 50 mcg (2000 units) tablet          Review of Systems   Constitutional:  Positive for fever.   Gastrointestinal:  Positive for blood in stool.       Physical Exam   BP: 129/77  Pulse: 73  Temp: (!) 101.7  F (38.7  C)  Resp: 18  Weight: 113.4 kg (250 lb)  SpO2: 92 %      Physical Exam  Constitutional:       General: She is not in acute distress.     Appearance: Normal appearance. She is obese. She is not diaphoretic.   HENT:      Head: Normocephalic and atraumatic.      Mouth/Throat:      Mouth: Mucous membranes are moist.   Eyes:      General: No scleral icterus.     Conjunctiva/sclera: Conjunctivae normal.   Cardiovascular:      Rate and Rhythm: Normal rate and regular rhythm.      Pulses: Normal pulses.      Heart sounds: Normal heart sounds.   Pulmonary:      Effort: Pulmonary effort is normal. No respiratory distress.      Breath sounds: Normal breath sounds. No wheezing.   Abdominal:      General: Abdomen is flat. There is no distension.      Tenderness: There is no abdominal tenderness.   Genitourinary:     General: Normal vulva.      Vagina: No vaginal discharge.      Rectum: Normal.      Comments: Sacrum was intact.  Small amount of brown stool.  Old dried blood seen vaginally.  Musculoskeletal:      Cervical back: Neck supple.   Skin:     General: Skin is warm.      Findings: No rash.      Comments: Left BKA stump well-healed.   Neurological:      General: No focal deficit present.      Mental Status: She is alert.      Comments: Oriented to person and place.   Psychiatric:         Mood and Affect: Mood normal.         ED Course              ED Course as of 08/20/23 0425   Fri Aug 18, 2023   2300 Per review of chart, last colonoscopy was in 2012 through [facility].  It was abnormal.  A polyp was seen.  Recommended follow-up was repeat colonoscopy in 1 year.  I do not see any other colonoscopy report since that time but it is documented she has refused colonoscopy at least in 2020.   Sat Aug  19, 2023   0016 Will check lactate, CXR, UA. Sepsis protocol   0120 Will call [facility] for possible transfer to higher level of care. We do not have dialysis here   0131 No beds at [facility].    0239 No rectal bleeding and no hemorrhoids.  Found to get a urine with straight cath and it is pus and blood macroscopically.   0306 Continue cefepime. Ok to stop vanco   0313 Outpt CT urogram/urology follow up   0334 Blood but no WBC in urine   0335 Not accepted for waitlist at [facility] at this time. Will board for now. Check with care team in AM to see if they would be comfortable admitting her here over the weekend. Dialysis due Monday 0358 No obvious identifiable source of infection.  There was blood in her urine but no white cells.  Chest x-ray shows slightly volume up but this is likely normal for her given her chronic dialysis.  Skin is warm and dry.  She has no headache and no fever.  Less likely meningeal or encephalitis type picture without any headache neck pain or fever.  She is at her baseline mental status.  Blood cultures have been drawn.   0545 Patient told nurse she wanted to go home.  She then told me she wanted to stay.  If she wants to leave it would be AGAINST MEDICAL ADVICE at this point in time.  Fever has improved.  Recommend waiting 24 to 48 hours to ensure blood cultures are negative prior to discharge.  She will need dialysis on Monday.  If increasing fever recommend transfer to higher level of care.  Patient aware.   1109 Patient was given cefepime and vancomycin IV.  Will discharge patient home on Omnicef for urinary as well as pulmonary source of possible fever.  Patient is not fluid overloaded and her electrolytes are stable and does not require urgent dialysis.  We will discharge her back to nursing home and she will follow-up with dialysis as planned on Monday     Procedures              Critical Care time:  none               Results for orders placed or performed during the hospital  encounter of 08/18/23 (from the past 24 hour(s))   CBC with platelets differential    Narrative    The following orders were created for panel order CBC with platelets differential.  Procedure                               Abnormality         Status                     ---------                               -----------         ------                     CBC with platelets and d...[[id number]]  Abnormal            Final result                 Please view results for these tests on the individual orders.   Comprehensive metabolic panel   Result Value Ref Range    Sodium 129 (L) 136 - 145 mmol/L    Potassium 4.5 3.4 - 5.3 mmol/L    Chloride 91 (L) 98 - 107 mmol/L    Carbon Dioxide (CO2) 25 22 - 29 mmol/L    Anion Gap 13 7 - 15 mmol/L    Urea Nitrogen 23.2 (H) 8.0 - 23.0 mg/dL    Creatinine 3.51 (H) 0.51 - 0.95 mg/dL    Calcium 9.7 8.8 - 10.2 mg/dL    Glucose 135 (H) 70 - 99 mg/dL    Alkaline Phosphatase 158 (H) 35 - 104 U/L    AST 18 0 - 45 U/L    ALT 15 0 - 50 U/L    Protein Total 7.0 6.4 - 8.3 g/dL    Albumin 3.7 3.5 - 5.2 g/dL    Bilirubin Total 0.5 <=1.2 mg/dL    GFR Estimate 13 (L) >60 mL/min/1.73m2   Extra Tube    Narrative    The following orders were created for panel order Extra Tube.  Procedure                               Abnormality         Status                     ---------                               -----------         ------                     Extra Blue Top Tube[[id number]]                              Final result               Extra Red Top Tube[[id number]]                               Final result                 Please view results for these tests on the individual orders.   Extra Blue Top Tube   Result Value Ref Range    Hold Specimen JIC    Extra Red Top Tube   Result Value Ref Range    Hold Specimen JIC    CBC with platelets and differential   Result Value Ref Range    WBC Count 12.9 (H) 4.0 - 11.0 10e3/uL    RBC Count 3.43 (L) 3.80 - 5.20 10e6/uL    Hemoglobin 10.9 (L) 11.7 - 15.7 g/dL     Hematocrit 34.5 (L) 35.0 - 47.0 %     (H) 78 - 100 fL    MCH 31.8 26.5 - 33.0 pg    MCHC 31.6 31.5 - 36.5 g/dL    RDW 16.7 (H) 10.0 - 15.0 %    Platelet Count 313 150 - 450 10e3/uL    % Neutrophils 69 %    % Lymphocytes 7 %    % Monocytes 22 %    % Eosinophils 0 %    % Basophils 1 %    % Immature Granulocytes 1 %    NRBCs per 100 WBC 0 <1 /100    Absolute Neutrophils 9.0 (H) 1.6 - 8.3 10e3/uL    Absolute Lymphocytes 0.9 0.8 - 5.3 10e3/uL    Absolute Monocytes 2.8 (H) 0.0 - 1.3 10e3/uL    Absolute Eosinophils 0.0 0.0 - 0.7 10e3/uL    Absolute Basophils 0.1 0.0 - 0.2 10e3/uL    Absolute Immature Granulocytes 0.1 <=0.4 10e3/uL    Absolute NRBCs 0.0 10e3/uL   Lactic Acid STAT   Result Value Ref Range    Lactic Acid 0.9 0.7 - 2.0 mmol/L   CT Abdomen Pelvis w/o Contrast    Narrative    PROCEDURE INFORMATION:   Exam: CT Abdomen And Pelvis Without Contrast   Exam date and time: 8/19/2023 12:14 AM   Age: 68 years old   Clinical indication: Other: Bloody diarrhea with fever; Additional info: Bloody   diarrhea with fever. End stage renal disease on dialysis so no contrast used     TECHNIQUE:   Imaging protocol: Computed tomography of the abdomen and pelvis without   contrast.   Radiation optimization: All CT scans at this facility use at least one of these   dose optimization techniques: automated exposure control; mA and/or kV   adjustment per patient size (includes targeted exams where dose is matched to   clinical indication); or iterative reconstruction.     REPORTING DATA:   Count of CT and Cardiac NM exams in prior 12 months: This patient has received   0 known CTs and 0 known cardiac nuclear medicine studies in the 12 months prior   to the current study.     COMPARISON:   CT ABDOMEN PELVIS W CONTRAST 8/7/2021 5:51 PM     FINDINGS:   Liver: Unremarkable.   Gallbladder and bile ducts: No calcified stones.    Pancreas: Unremarkable.   Spleen: Splenomegaly.   Adrenal glands: Normal. No mass.   Kidneys and ureters: No  hydronephrosis.   Stomach and bowel: No obstruction. Gaseous and fecal loading   Appendix: No evidence of appendicitis.     Intraperitoneal space: No free air. No abscess. No ascites.   Vasculature: Moderate atherosclerosis. No aneurysm.   Lymph nodes: No significant adenopathy.   Urinary bladder: Unremarkable as visualized.   Reproductive: Unremarkable.   Bones/joints: No acute fracture. DJD and scoliosis.   Soft tissues: Unremarkable.       Impression    IMPRESSION:   1.   No evidence of acute intrabdominal pathology.   2.   Non-emergent findings, as above.     THIS DOCUMENT HAS BEEN ELECTRONICALLY SIGNED BY ANAMIKA GREY MD   XR Chest Port 1 View    Narrative    PROCEDURE INFORMATION:   Exam: XR Chest   Exam date and time: 8/19/2023 12:44 AM   Age: 68 years old   Clinical indication: Fever; Additional info: Fever, sepsis     TECHNIQUE:   Imaging protocol: Radiologic exam of the chest.   Views: 1 view.     COMPARISON:   CR XR CHEST PORT 1 VIEW 2/26/2023 4:25 PM     FINDINGS:   Lungs:  Mild interstitial prominence. No consolidation.  Stable right midlung   nodule.  Pleural spaces: Unremarkable. No pleural effusion. No pneumothorax.   Heart/Mediastinum:  Stable size and configuration.   Bones/joints:  Unchanged.       Impression    IMPRESSION:   No evidence of acute pathology.     THIS DOCUMENT HAS BEEN ELECTRONICALLY SIGNED BY ANAMIKA GREY MD   Symptomatic Influenza A/B, RSV, & SARS-CoV2 PCR (COVID-19) Nose    Specimen: Nose; Swab   Result Value Ref Range    Influenza A PCR Negative Negative    Influenza B PCR Negative Negative    RSV PCR Negative Negative    SARS CoV2 PCR Negative Negative    Narrative    Testing was performed using the Xpert Xpress CoV2/Flu/RSV Assay on the [employer] GeneXpert Instrument. This test should be ordered for the detection of SARS-CoV-2, influenza, and RSV viruses in individuals who meet clinical and/or epidemiological criteria. Test performance is unknown in asymptomatic patients.  This test is for in vitro diagnostic use under the FDA EUA for laboratories certified under CLIA to perform high or moderate complexity testing. This test has not been FDA cleared or approved. A negative result does not rule out the presence of PCR inhibitors in the specimen or target RNA in concentration below the limit of detection for the assay. If only one viral target is positive but coinfection with multiple targets is suspected, the sample should be re-tested with another FDA cleared, approved, or authorized test, if coinfection would change clinical management. This test was validated by the [facility]. These laboratories are certified under the Clinical Laboratory Improvement Amendments of 1988 (CLIA-88) as qualified to perform high complexity laboratory testing.   Occult blood stool   Result Value Ref Range    Occult Blood Negative Negative   UA with Microscopic reflex to Culture    Specimen: Urine, Catheter   Result Value Ref Range    Color Urine Red (A) Colorless, Straw, Light Yellow, Yellow    Appearance Urine Cloudy (A) Clear    Glucose Urine      Bilirubin Urine      Ketones Urine      Specific Gravity Urine      Blood Urine      pH Urine      Protein Albumin Urine      Urobilinogen Urine      Nitrite Urine      Leukocyte Esterase Urine      Budding Yeast Urine Few (A) None Seen /HPF    Mucus Urine Present (A) None Seen /LPF    RBC Urine >182 (H) <=2 /HPF    WBC Urine 0 <=5 /HPF       Medications   sodium chloride (PF) 0.9% PF flush 3 mL (has no administration in time range)   sodium chloride (PF) 0.9% PF flush 3 mL (3 mLs Intracatheter $Given 8/19/23 0207)   acetaminophen (TYLENOL) tablet 650 mg (has no administration in time range)     Or   acetaminophen (TYLENOL) Suppository 650 mg (has no administration in time range)   pantoprazole (PROTONIX) IV push injection 40 mg (40 mg Intravenous $Given 8/18/23 0386)   vancomycin (VANCOCIN) 2,000 mg in sodium chloride 0.9 % 500 mL  intermittent infusion (0 mg Intravenous Stopped 8/19/23 0422)   ceFEPIme (MAXIPIME) 1g vial to attach to  ml bag for ADULTS or NS 50 ml bag for PEDS (0 g Intravenous Stopped 8/19/23 0422)       Assessments & Plan (with Medical Decision Making)     I have reviewed the nursing notes.    I have reviewed the findings, diagnosis, plan and need for follow up with the patient.           Medical Decision Making  The patient's presentation was of moderate complexity (a chronic illness mild to moderate exacerbation, progression, or side effect of treatment).    The patient's evaluation involved:  ordering and/or review of 3+ test(s) in this encounter (see separate area of note for details)  review of 3+ test result(s) ordered prior to this encounter (see separate area of note for details)    The patient's management necessitated moderate risk (prescription drug management including medications given in the ED).        New Prescriptions    No medications on file       Final diagnoses:   Fever, unspecified fever cause   Patient had Hemoccult negative stool here.  No bleeding.  Hemoglobin has been stable.  She was noted to have a fever.  She denied diarrhea or abdominal pain.  She denied headache fevers or chills.  She denied chest, digestion.  Chest x-ray showed mild fluffiness which is likely expected given her history of kidney disease and dialysis.  There were no acute infiltrates.  Urinalysis was negative for acute infection.  CT of the abdomen was negative for acute findings.  Essentially at this time I do not have a good answer for her fever.  She is not known to be immune compromised.  We are unable to use contrast given her history of kidney disease on dialysis.  She was given vancomycin and cefepime.  Unfortunately at this time there is no bed availability to transfer to higher level of care.  I will check with my day team to see if they be willing to admit her here.  Of note we do not have dialysis available  here at all.  She normally does dialysis in the community.  Her dialysis days are Monday Wednesday and Friday.  Blood cultures have been sent and are pending at this time.    8/18/2023   [facility] AND [facility]       Genie Brown, DO  08/19/23 0616       Genie Brown, DO  08/19/23 0656       Genie Brown, DO  08/20/23 0425

## 2023-09-14 NOTE — ED NOTES
Report called from [facility], staff states that pt has had increased abdominal/flank pain with low grade fevers, decreased output and general malaise

## 2023-09-14 NOTE — PHARMACY-VANCOMYCIN DOSING SERVICE
Pharmacy Vancomycin Initial Note  Date of Service 2023  Patient's  1955  68 year old, female    Indication: Sepsis, UTI    Current estimated CrCl = Estimated Creatinine Clearance: 12.3 mL/min (A) (based on SCr of 5.07 mg/dL (H)).    Creatinine for last 3 days  2023: 12:21 PM Creatinine 5.07 mg/dL    Recent Vancomycin Level(s) for last 3 days  No results found for requested labs within last 3 days.      Vancomycin IV Administrations (past 72 hours)        No vancomycin orders with administrations in past 72 hours.                    Nephrotoxins and other renal medications (From now, onward)      Start     Dose/Rate Route Frequency Ordered Stop    23 1425  vancomycin (VANCOCIN) 2,000 mg in sodium chloride 0.9 % 500 mL intermittent infusion         2,000 mg  over 2 Hours Intravenous ONCE 23 1424              Contrast Orders - past 72 hours (72h ago, onward)      None            [employer] Prediction of Planned Initial Vancomycin Regimen  Loading dose: 2000 mg at 15:00 2023.  Regimen: 500 mg IV every 24 hours.  Start time: 14:29 on 2023  Exposure target: AUC24 (range)400-600 mg/L.hr   AUC24,ss: 505 mg/L.hr  Probability of AUC24 > 400: 75 %  Ctrough,ss: 19 mg/L  Probability of Ctrough,ss > 20: 44 %  Probability of nephrotoxicity (Lodise CINDY ): 16 %        Plan:  Start vancomycin  2000 mg IV once, then 500 mg Q24h (order placed w/ start date 9/15 should patient not transfer)  Vancomycin monitoring method: AUC  Vancomycin therapeutic monitoring goal: 400-600 mg*h/L  Pharmacy will check vancomycin levels as appropriate in 1-3 Days.    Serum creatinine levels will be ordered daily for the first week of therapy and at least twice weekly for subsequent weeks.      Hilda Rm RPH

## 2023-09-14 NOTE — ED PROVIDER NOTES
History     Chief Complaint   Patient presents with    Flank Pain    Abdominal Pain    Fever     The history is provided by the patient and medical records.     Lucila Willett is a 68 year old female here from [facility] with abdominal pain/ flank pain, fever, malaise. Onset of pain was yesterday and it was 7 or 8 or 10. She had no blood in the urine and no urine symptoms. She has had fever and chills, some nausea, no vomiting. She did get some fentanyl en route by EMS and her pain here is 3 or 4 of 10.     No history of kidney stones.    Allergies:  Allergies   Allergen Reactions    Piperacillin Sod-Tazobactam So Hives and Rash     Given IV benadryl, and Pepcid in outside facility.     Cetirizine      Other reaction(s): *Unknown - Pt Doesn't Remember    Pregabalin      Other reaction(s): Other - Describe In Comment Field  Floaters in eyes.     Tazobactam     Latex Rash     Patient states she gets a rash    Rosiglitazone Rash     Patient states she gets a rash    Zosyn [Piperacillin-Tazobactam In Dex] Rash       Problem List:    Patient Active Problem List    Diagnosis Date Noted    Anxiety with agitation 04/09/2023     Priority: Medium    Charcot ankle, right 06/21/2022     Priority: Medium     Formatting of this note might be different from the original. Added automatically from request for surgery [id number]      Other hemorrhoids 12/19/2021     Priority: Medium    History of left below knee amputation (H) 12/02/2021     Priority: Medium    Cellulitis 08/21/2020     Priority: Medium    Iron deficiency anemia, unspecified 06/18/2020     Priority: Medium    Nursing home resident 06/10/2020     Priority: Medium     Formatting of this note might be different from the original. [facility]      ESRD needing dialysis (H) 09/20/2019     Priority: Medium    Dependence on renal dialysis (H) 07/21/2019     Priority: Medium    Coagulation defect, unspecified (H) 07/20/2019     Priority: Medium    Acute on chronic  diastolic (congestive) heart failure (H) 07/19/2019     Priority: Medium    Colonoscopy refused 04/17/2017     Priority: Medium    Lymphedema 03/06/2017     Priority: Medium    Cellulitis of left lower extremity 06/09/2016     Priority: Medium    Foot ulcer (H) 01/11/2016     Priority: Medium    Diastolic heart failure (H) 07/01/2015     Priority: Medium    Chronic combined systolic and diastolic heart failure (H) 07/01/2015     Priority: Medium    Chronic pain disorder 06/30/2015     Priority: Medium     Overview:   Overview:   [facility] Agreement for Opioid Treatment  PHYSICIAN:  Gauri Grimm MD  PHARMACY:  [facility] in [place], [place]  PHARMACY PHONE:  [phone]    Last two urine drug screens have been negative - on 1/7/13 it had been two days since she took lortab.  Negative for norco aug 2015      Type 2 diabetes mellitus (H) 06/11/2013     Priority: Medium     Overview:   Overview:   LDL 94 - on crestor  Off valsartan because of worsening renal function ? Hypotension (10/2014: will retry low dose lisinopril)  On ASA  Right diabetic retinopathy on right 2/2014      Anemia 09/12/2012     Priority: Medium    Cellulitis and abscess of leg, except foot 09/12/2012     Priority: Medium    Diabetes mellitus, type II (H) 09/12/2012     Priority: Medium     Overview:   a system change updated this record. This will not affect patient care or billing. This comment can be deleted.      Hypertensive heart failure with end stage renal disease (H) 09/12/2012     Priority: Medium    Morbid obesity (H) 09/12/2012     Priority: Medium    Chronic kidney disease, stage IV (severe) (H) 03/23/2012     Priority: Medium     Overview:   Overview:   IMO Update 10/11      Atherosclerosis of coronary artery 03/17/2010     Priority: Medium     Overview:   Overview:   IMO Update      Gout 02/29/2008     Priority: Medium    Essential hypertension 02/27/2008     Priority: Medium    Hypothyroidism 02/27/2008     Priority: Medium      Overview:   Overview:   IMO Update 10/11      Sleep apnea 2008     Priority: Medium     Overview:   Overview:   10/2012: cpap did not work - trying BIPAP          Past Medical History:    Past Medical History:   Diagnosis Date    Atherosclerotic heart disease of native coronary artery without angina pectoris     Cellulitis     Chronic kidney disease     Diastolic congestive heart failure (H)     Hypothyroidism     Major depressive disorder, single episode     Obesity     Personal history of other medical treatment (CODE)     Sleep apnea     Type 2 diabetes mellitus without complications (H)        Past Surgical History:    Past Surgical History:   Procedure Laterality Date    CHOLECYSTECTOMY      No Comments Provided    OTHER SURGICAL HISTORY      162558,OTHER    OTHER SURGICAL HISTORY      99488.0,AL SPINE FUSION ANTER 3 SGMTS    OTHER SURGICAL HISTORY      LOC-1006.04,CABG    OTHER SURGICAL HISTORY      748681,ENDOSCOPY GI       Family History:    History reviewed. No pertinent family history.    Social History:  Marital Status:   [4]  Social History     Tobacco Use    Smoking status: Former     Packs/day: 2.00     Years: 20.00     Pack years: 40.00     Types: Cigarettes     Quit date: 1997     Years since quittin.0    Smokeless tobacco: Never   Substance Use Topics    Alcohol use: Yes     Alcohol/week: 0.0 standard drinks of alcohol     Comment: Alcoholic Drinks/day: Occasionally    Drug use: Unknown     Types: Other     Comment: Drug use: No        Medications:    acetaminophen (TYLENOL) 325 MG tablet  allopurinol (ZYLOPRIM) 100 MG tablet  alum & mag hydroxide-simethicone (MAALOX) 200-200-20 MG/5ML SUSP suspension  aspirin (ASA) 81 MG chewable tablet  atorvastatin (LIPITOR) 80 MG tablet  B Complex-C-Folic Acid (DIALYVITE 800 PO)  blood glucose (NO BRAND SPECIFIED) lancets standard  blood glucose monitoring (NO BRAND SPECIFIED) test strip  buPROPion (WELLBUTRIN XL) 150 MG 24 hr  "tablet  clopidogrel (PLAVIX) 75 MG tablet  diclofenac (VOLTAREN) 1 % topical gel  FLUoxetine (PROZAC) 20 MG capsule  fluticasone (FLONASE) 50 MCG/ACT spray  gabapentin (NEURONTIN) 100 MG capsule  guaiFENesin (ROBITUSSIN) 20 mg/mL SOLN solution  hypromellose (GENTEAL) 0.3 % SOLN ophthalmic solution  insulin aspart (NOVOLOG PEN) 100 UNIT/ML injection  insulin aspart (NOVOLOG PEN) 100 UNIT/ML pen  insulin detemir (LEVEMIR PEN) 100 UNIT/ML pen  levothyroxine (SYNTHROID/LEVOTHROID) 200 MCG tablet  levothyroxine (SYNTHROID/LEVOTHROID) 25 MCG tablet  lidocaine-prilocaine (EMLA) 2.5-2.5 % external cream  loratadine (CLARITIN) 10 MG tablet  magnesium hydroxide (MILK OF MAGNESIA) 400 MG/5ML suspension  Menthol (COUGH DROPS) 5.8 MG LOZG  miconazole (MICATIN) 2 % cream  midodrine (PROAMATINE) 10 MG tablet  midodrine (PROAMATINE) 10 MG tablet  montelukast (SINGULAIR) 10 MG tablet  Needle, Disp, 31G X 5/16\" MISC  nitroGLYcerin (NITROSTAT) 0.4 MG sublingual tablet  nystatin (MYCOSTATIN) 004872 UNIT/GM external powder  omeprazole (PRILOSEC) 20 MG DR capsule  ondansetron (ZOFRAN ODT) 4 MG ODT tab  polyethylene glycol (MIRALAX) 17 GM/Dose powder  senna-docusate (SENOKOT-S/PERICOLACE) 8.6-50 MG tablet  sevelamer carbonate (RENVELA) 800 MG tablet  vitamin D3 (CHOLECALCIFEROL) 50 mcg (2000 units) tablet      Review of Systems   Constitutional:  Positive for fever.   Genitourinary:  Positive for flank pain.   Musculoskeletal:  Positive for back pain.   All other systems reviewed and are negative.      Physical Exam   BP: 123/72  Pulse: 78  Temp: (!) 100.7  F (38.2  C)  Resp: 18  Height: 162.6 cm (5' 4\")  Weight: 104.3 kg (230 lb)  SpO2: 96 %      Physical Exam  Vitals and nursing note reviewed.   Constitutional:       General: She is not in acute distress.     Appearance: She is well-developed. She is ill-appearing. She is not toxic-appearing or diaphoretic.   Cardiovascular:      Rate and Rhythm: Normal rate and regular rhythm.      " Heart sounds: Normal heart sounds. No murmur heard.  Pulmonary:      Effort: Pulmonary effort is normal. No respiratory distress.      Breath sounds: Normal breath sounds.   Abdominal:      General: Abdomen is flat. Bowel sounds are normal. There is no distension.      Palpations: Abdomen is soft. There is no mass.      Tenderness: There is no abdominal tenderness.   Skin:     General: Skin is warm and dry.   Neurological:      General: No focal deficit present.      Mental Status: She is alert and oriented to person, place, and time.         Results for orders placed or performed during the hospital encounter of 09/14/23 (from the past 24 hour(s))   [place] Draw    Narrative    The following orders were created for panel order [place] Draw.  Procedure                               Abnormality         Status                     ---------                               -----------         ------                     Extra Blue Top Tube[[id number]]                              Final result               Extra Red Top Tube[[id number]]                               Final result               Extra Green Top (Lithium...[[id number]]                      Final result               Extra Purple Top Tube[[id number]]                            In process                 Extra Green Top (Lithium...[[id number]]                      Final result                 Please view results for these tests on the individual orders.   Extra Blue Top Tube   Result Value Ref Range    Hold Specimen JIC    Extra Red Top Tube   Result Value Ref Range    Hold Specimen JIC    Extra Green Top (Lithium Heparin) Tube   Result Value Ref Range    Hold Specimen JIC    Extra Green Top (Lithium Heparin) ON ICE   Result Value Ref Range    Hold Specimen JIC    Lactic Acid STAT   Result Value Ref Range    Lactic Acid 1.3 0.7 - 2.0 mmol/L   CBC with platelets differential    Narrative    The following orders were created for panel order CBC with platelets  differential.  Procedure                               Abnormality         Status                     ---------                               -----------         ------                     CBC with platelets and d...[[id number]]  Abnormal            Final result                 Please view results for these tests on the individual orders.   Comprehensive metabolic panel   Result Value Ref Range    Sodium 133 (L) 136 - 145 mmol/L    Potassium 4.9 3.4 - 5.3 mmol/L    Chloride 91 (L) 98 - 107 mmol/L    Carbon Dioxide (CO2) 27 22 - 29 mmol/L    Anion Gap 15 7 - 15 mmol/L    Urea Nitrogen 36.0 (H) 8.0 - 23.0 mg/dL    Creatinine 5.07 (H) 0.51 - 0.95 mg/dL    Calcium 9.8 8.8 - 10.2 mg/dL    Glucose 173 (H) 70 - 99 mg/dL    Alkaline Phosphatase 170 (H) 35 - 104 U/L    AST 17 0 - 45 U/L    ALT 17 0 - 50 U/L    Protein Total 7.2 6.4 - 8.3 g/dL    Albumin 3.7 3.5 - 5.2 g/dL    Bilirubin Total 0.4 <=1.2 mg/dL    GFR Estimate 9 (L) >60 mL/min/1.73m2   CBC with platelets and differential   Result Value Ref Range    WBC Count 18.9 (H) 4.0 - 11.0 10e3/uL    RBC Count 3.21 (L) 3.80 - 5.20 10e6/uL    Hemoglobin 10.4 (L) 11.7 - 15.7 g/dL    Hematocrit 32.1 (L) 35.0 - 47.0 %     78 - 100 fL    MCH 32.4 26.5 - 33.0 pg    MCHC 32.4 31.5 - 36.5 g/dL    RDW 17.7 (H) 10.0 - 15.0 %    Platelet Count 351 150 - 450 10e3/uL    % Neutrophils 76 %    % Lymphocytes 6 %    % Monocytes 15 %    % Eosinophils 1 %    % Basophils 0 %    % Immature Granulocytes 2 %    NRBCs per 100 WBC 0 <1 /100    Absolute Neutrophils 14.5 (H) 1.6 - 8.3 10e3/uL    Absolute Lymphocytes 1.1 0.8 - 5.3 10e3/uL    Absolute Monocytes 2.9 (H) 0.0 - 1.3 10e3/uL    Absolute Eosinophils 0.1 0.0 - 0.7 10e3/uL    Absolute Basophils 0.1 0.0 - 0.2 10e3/uL    Absolute Immature Granulocytes 0.3 <=0.4 10e3/uL    Absolute NRBCs 0.0 10e3/uL   XR Chest Port 1 View    Narrative    PROCEDURE:  XR CHEST PORT 1 VIEW    HISTORY: Fever. .    COMPARISON:  8/19/2023    FINDINGS:    The  cardiomediastinal contours are stable. There is calcific aortic  atherosclerosis.   Interstitial thickening is worse when compared to the immediate prior.  No discrete consolidation, significant effusion or pneumothorax is  seen      Impression    IMPRESSION:  Findings suggest recurrent interstitial edema. Atypical  infection can have an overlapping appearance. Recommend follow-up.      VERÓNICA DUKE MD         SYSTEM ID:  [id number]   UA Macroscopic with reflex to Microscopic and Culture    Specimen: Urine, Catheter   Result Value Ref Range    Color Urine Orange (A) Colorless, Straw, Light Yellow, Yellow    Appearance Urine Cloudy (A) Clear    Glucose Urine Negative Negative mg/dL    Bilirubin Urine Negative Negative    Ketones Urine Negative Negative mg/dL    Specific Gravity Urine 1.017 1.000 - 1.030    Blood Urine Small (A) Negative    pH Urine 6.0 5.0 - 9.0    Protein Albumin Urine 100 (A) Negative mg/dL    Urobilinogen Urine Normal Normal, 2.0 mg/dL    Nitrite Urine Negative Negative    Leukocyte Esterase Urine Large (A) Negative    Bacteria Urine Many (A) None Seen /HPF    WBC Clumps Urine Present (A) None Seen /HPF    RBC Urine 31 (H) <=2 /HPF    WBC Urine >182 (H) <=5 /HPF    Narrative    Urine Culture ordered based on laboratory criteria   CT Abdomen Pelvis w/o Contrast    Narrative    PROCEDURE:  CT ABDOMEN PELVIS W/O CONTRAST    HISTORY:  fever, flank pain, UTI- concern for pyelonephritis vs  infected ureteral stone    TECHNIQUE:  Helical CT of the abdomen and pelvis was performed without  intravenous contrast. This CT exam was performed using one or more the  following dose reduction techniques: automated exposure control,  adjustment of the mA and/or kV according to patient size, and/or  iterative reconstruction technique.    COMPARISON:  8/19/2023    FINDINGS:      Evaluation of the solid organs is somewhat limited due to the lack of  intravenous contrast.    Limited views through the lung bases  "demonstrate cardiomegaly and mild  basilar scarring or atelectasis. Trace pleural fluid is seen.    There is extensive left perinephric stranding and trace left  retroperitoneal fluid. There is mild left ureteral and calyceal  fullness. No renal or ureteral calculus is seen. Right kidney is  unremarkable in noncontrast appearance line for vascular  calcifications.    A bulky calcification within the pancreatic head is chronic and  unchanged. The gallbladder is not seen. The liver, spleen, pancreas  and adrenal glands are stable. There is no abdominal aortic aneurysm.       The bowel is normal in caliber. .    No suspicious osseous lesions are identified.      Impression    IMPRESSION:      Left perinephric stranding. Mild left ureteral and calyceal fullness  without evidence of collecting system calculus. The appearance is  compatible with the given suspicion for left pyelonephritis in the  appropriate clinical context.    VERÓNICA DUKE MD         SYSTEM ID:  [id number]       Medications   sodium chloride (PF) 0.9% PF flush 3 mL (has no administration in time range)   sodium chloride (PF) 0.9% PF flush 3 mL (has no administration in time range)   levofloxacin (LEVAQUIN) infusion 750 mg (has no administration in time range)   vancomycin (VANCOCIN) 2,000 mg in sodium chloride 0.9 % 500 mL intermittent infusion (has no administration in time range)       Assessments & Plan (with Medical Decision Making)  Lucila Willett is a 68 year old female here from [facility] with abdominal pain/ flank pain, fever, malaise. Onset of pain was yesterday and it was 7 or 8 or 10. She had no blood in the urine and no urine symptoms. She has had fever and chills, some nausea, no vomiting. She did get some fentanyl en route by EMS and her pain here is 3 or 4 of 10.  No history of kidney stones.  VS in the ED shows fever /65   Pulse 75   Temp (!) 100.7  F (38.2  C) (Tympanic)   Resp 13   Ht 1.626 m (5' 4\")   Wt 104.3 kg " (230 lb)   SpO2 98%   BMI 39.48 kg/m    Exam shows no change in pain with palpation of the left flank. Labs show CBC with WBC 18,900, hgb 10.4, CMP with Na 133, Cl 91, Cr 5.07, alk phos 170, lactic acid 1.3, UA implies UTI, UC  and BC x 2 pending.  CT abdomen and pelvis implies left pyelonephritis. We are going to treat with Levaquin and vancomycin. Due to dialysis we will need to transfer her. Her next dialysis is tomorrow.  I spoke with [facility] and they have a bed for her, Dr Franco will be accepting.      I have reviewed the nursing notes.    I have reviewed the findings, diagnosis, plan and need for follow up with the patient.     Medical Decision Making  The patient's presentation was of moderate complexity (an acute illness with systemic symptoms).    The patient's evaluation involved:  an assessment requiring an independent historian (see separate area of note for details)  ordering and/or review of 3+ test(s) in this encounter (see separate area of note for details)    The patient's management necessitated high risk (a decision regarding hospitalization).    Final diagnoses:   Pyelonephritis   Dependence on renal dialysis (H)   Chronic kidney disease, stage IV (severe) (H)       9/14/2023   [facility] AND [facility]       Dainel Julian MD  09/14/23 [id number]       Daniel Julian MD  09/14/23 [id number]

## 2023-09-14 NOTE — ED TRIAGE NOTES
Patient come via [facility] from [facility] with concerns of flank pain, fever, and abdominal pain.  Pain noted to left flank area upon palpation.  No pain with palpation to abdomen.  Fever started last NOC.  Has dialysis M, W, F.  Unable to complete run on Wednesday due to back pain.  Does not void much usually, but has been experiencing urgency but unable to void.  Also having constipation, last BM four days ago.     Triage Assessment       Row Name 09/14/23 1202       Triage Assessment (Adult)    Airway WDL WDL       Respiratory WDL    Respiratory WDL X;rhythm/pattern    Rhythm/Pattern, Respiratory shortness of breath       Skin Circulation/Temperature WDL    Skin Circulation/Temperature WDL WDL       Cardiac WDL    Cardiac WDL WDL       Peripheral/Neurovascular WDL    Peripheral Neurovascular WDL WDL       Cognitive/Neuro/Behavioral WDL    Cognitive/Neuro/Behavioral WDL WDL

## 2023-09-15 NOTE — ED NOTES
Call from lab. 4/4 pos blood culture gm- rods. Patient was transferred to [place] and is under care at this time.      Genie Brown,   09/15/23 0446

## 2023-09-16 LAB — BACTERIA UR CULT: ABNORMAL

## 2023-09-17 LAB
BACTERIA BLD CULT: ABNORMAL
BACTERIA BLD CULT: ABNORMAL

## 2023-10-11 NOTE — ED TRIAGE NOTES
"Pt arrives to ER via EMS. Per EMS report, pt lives at [facility], became short of breath during the night. O2 was increased to 2.5L NC. EMS was called. Nebulizer was given, pt became nauseous, 4mg of IV zofran given. Pt arrived to ER on 3LPM NC, denies SOB, endorses generalized pain. Stated \"I'm tired and want to go back to sleep.\" Pt is not visibly in distress. VSS.     Triage Assessment     Row Name 11/14/22 0303       Triage Assessment (Adult)    Airway WDL WDL    Additional Documentation Breath Sounds (Group)       Respiratory WDL    Respiratory WDL X;rhythm/pattern;cough    Rhythm/Pattern, Respiratory tachypneic    Cough Frequency infrequent    Cough Type dry;good;nonproductive       Breath Sounds    Breath Sounds All Fields    All Lung Fields Breath Sounds diminished       Skin Circulation/Temperature WDL    Skin Circulation/Temperature WDL WDL       Cardiac WDL    Cardiac WDL WDL       Peripheral/Neurovascular WDL    Peripheral Neurovascular WDL WDL  LLE BKA       Cognitive/Neuro/Behavioral WDL    Cognitive/Neuro/Behavioral WDL WDL              " 30

## (undated) RX ORDER — METOPROLOL TARTRATE 25 MG/1
TABLET, FILM COATED ORAL
Status: DISPENSED
Start: 2021-09-14

## (undated) RX ORDER — FENTANYL CITRATE 50 UG/ML
INJECTION, SOLUTION INTRAMUSCULAR; INTRAVENOUS
Status: DISPENSED
Start: 2021-08-13

## (undated) RX ORDER — HEPARIN SODIUM 10000 [USP'U]/100ML
INJECTION, SOLUTION INTRAVENOUS
Status: DISPENSED
Start: 2023-01-01

## (undated) RX ORDER — HYDROMORPHONE HYDROCHLORIDE 1 MG/ML
INJECTION, SOLUTION INTRAMUSCULAR; INTRAVENOUS; SUBCUTANEOUS
Status: DISPENSED
Start: 2023-01-01

## (undated) RX ORDER — ONDANSETRON 4 MG/1
TABLET, ORALLY DISINTEGRATING ORAL
Status: DISPENSED
Start: 2021-10-01

## (undated) RX ORDER — MAGNESIUM OXIDE 400 MG/1
TABLET ORAL
Status: DISPENSED
Start: 2021-10-01

## (undated) RX ORDER — TRAMADOL HYDROCHLORIDE 50 MG/1
TABLET ORAL
Status: DISPENSED
Start: 2023-01-01

## (undated) RX ORDER — DIPHENHYDRAMINE HYDROCHLORIDE 50 MG/ML
INJECTION INTRAMUSCULAR; INTRAVENOUS
Status: DISPENSED
Start: 2019-12-05

## (undated) RX ORDER — LEVOFLOXACIN 5 MG/ML
INJECTION, SOLUTION INTRAVENOUS
Status: DISPENSED
Start: 2023-01-01

## (undated) RX ORDER — DEXTROSE MONOHYDRATE 25 G/50ML
INJECTION, SOLUTION INTRAVENOUS
Status: DISPENSED
Start: 2021-08-07

## (undated) RX ORDER — SODIUM CHLORIDE 9 MG/ML
INJECTION, SOLUTION INTRAVENOUS
Status: DISPENSED
Start: 2021-08-13

## (undated) RX ORDER — SODIUM CHLORIDE 9 MG/ML
INJECTION, SOLUTION INTRAVENOUS
Status: DISPENSED
Start: 2021-09-13

## (undated) RX ORDER — POTASSIUM CHLORIDE 7.45 MG/ML
INJECTION INTRAVENOUS
Status: DISPENSED
Start: 2021-10-01

## (undated) RX ORDER — CEFEPIME HYDROCHLORIDE 1 G/1
INJECTION, POWDER, FOR SOLUTION INTRAMUSCULAR; INTRAVENOUS
Status: DISPENSED
Start: 2023-01-01

## (undated) RX ORDER — CALCIUM GLUCONATE 94 MG/ML
INJECTION, SOLUTION INTRAVENOUS
Status: DISPENSED
Start: 2023-01-01

## (undated) RX ORDER — PANTOPRAZOLE SODIUM 40 MG/10ML
INJECTION, POWDER, LYOPHILIZED, FOR SOLUTION INTRAVENOUS
Status: DISPENSED
Start: 2023-01-01

## (undated) RX ORDER — SODIUM CHLORIDE 9 MG/ML
INJECTION, SOLUTION INTRAVENOUS
Status: DISPENSED
Start: 2021-10-01

## (undated) RX ORDER — CEFTRIAXONE SODIUM 1 G/50ML
INJECTION, SOLUTION INTRAVENOUS
Status: DISPENSED
Start: 2021-10-02